# Patient Record
Sex: MALE | Race: WHITE | HISPANIC OR LATINO | URBAN - METROPOLITAN AREA
[De-identification: names, ages, dates, MRNs, and addresses within clinical notes are randomized per-mention and may not be internally consistent; named-entity substitution may affect disease eponyms.]

---

## 2017-05-26 PROBLEM — Z00.00 ENCOUNTER FOR PREVENTIVE HEALTH EXAMINATION: Status: ACTIVE | Noted: 2017-05-26

## 2017-06-20 ENCOUNTER — OUTPATIENT (OUTPATIENT)
Dept: OUTPATIENT SERVICES | Facility: HOSPITAL | Age: 57
LOS: 1 days | Discharge: HOME | End: 2017-06-20

## 2017-06-20 ENCOUNTER — APPOINTMENT (OUTPATIENT)
Dept: INTERNAL MEDICINE | Facility: CLINIC | Age: 57
End: 2017-06-20

## 2017-06-20 VITALS
TEMPERATURE: 95.9 F | HEIGHT: 68 IN | DIASTOLIC BLOOD PRESSURE: 81 MMHG | SYSTOLIC BLOOD PRESSURE: 132 MMHG | BODY MASS INDEX: 30.01 KG/M2 | HEART RATE: 62 BPM | WEIGHT: 198 LBS

## 2017-06-20 DIAGNOSIS — E11.9 TYPE 2 DIABETES MELLITUS W/OUT COMPLICATIONS: ICD-10-CM

## 2017-06-20 DIAGNOSIS — Z78.9 OTHER SPECIFIED HEALTH STATUS: ICD-10-CM

## 2017-06-20 DIAGNOSIS — I10 ESSENTIAL (PRIMARY) HYPERTENSION: ICD-10-CM

## 2017-06-20 RX ORDER — METFORMIN HYDROCHLORIDE 1000 MG/1
1000 TABLET, FILM COATED, EXTENDED RELEASE ORAL TWICE DAILY
Qty: 60 | Refills: 0 | Status: DISCONTINUED | COMMUNITY
Start: 2017-06-20 | End: 2017-06-20

## 2017-06-20 RX ORDER — METFORMIN HYDROCHLORIDE 1000 MG/1
1000 TABLET, COATED ORAL
Qty: 60 | Refills: 2 | Status: ACTIVE | COMMUNITY
Start: 2017-06-20 | End: 1900-01-01

## 2017-06-20 RX ORDER — BLOOD SUGAR DIAGNOSTIC
STRIP MISCELLANEOUS 3 TIMES DAILY
Qty: 1 | Refills: 5 | Status: ACTIVE | COMMUNITY
Start: 2017-06-20 | End: 1900-01-01

## 2017-06-20 RX ORDER — ENALAPRIL MALEATE 10 MG/1
10 TABLET ORAL TWICE DAILY
Qty: 60 | Refills: 2 | Status: ACTIVE | COMMUNITY
Start: 2017-06-20 | End: 1900-01-01

## 2017-06-21 LAB
ALBUMIN SERPL-MCNC: 4.6 G/DL
ALBUMIN/GLOB SERPL: 1.77
ALP SERPL-CCNC: 130 IU/L
ALT SERPL-CCNC: 25 IU/L
ANION GAP SERPL CALC-SCNC: 8 MEQ/L
AST SERPL-CCNC: 26 IU/L
BASOPHILS # BLD: 0.02 TH/MM3
BASOPHILS NFR BLD: 0.2 %
BILIRUB SERPL-MCNC: 1.1 MG/DL
BUN SERPL-MCNC: 16 MG/DL
BUN/CREAT SERPL: 20.5 %
CALCIUM SERPL-MCNC: 10 MG/DL
CHLORIDE SERPL-SCNC: 104 MEQ/L
CHOLEST SERPL-MCNC: 193 MG/DL
CO2 SERPL-SCNC: 26 MEQ/L
CREAT SERPL-MCNC: 0.78 MG/DL
CREAT UR-MCNC: 72 MG/DL
DIFFERENTIAL METHOD BLD: NORMAL
EOSINOPHIL # BLD: 0.22 TH/MM3
EOSINOPHIL NFR BLD: 2.7 %
ERYTHROCYTE [DISTWIDTH] IN BLOOD BY AUTOMATED COUNT: 12.6 %
ESTIMATED AVERGAGE GLUCOSE (NORTH): 148 MG/DL
GFR SERPL CREATININE-BSD FRML MDRD: 103
GLUCOSE SERPL-MCNC: 166 MG/DL
GRANULOCYTES # BLD: 5.42 TH/MM3
GRANULOCYTES NFR BLD: 66.4 %
HBA1C MFR BLD: 6.8 %
HCT VFR BLD AUTO: 43.6 %
HDLC SERPL-MCNC: 47 MG/DL
HDLC SERPL: 4.11
HGB BLD-MCNC: 14.8 G/DL
IMM GRANULOCYTES # BLD: 0.03 TH/MM3
IMM GRANULOCYTES NFR BLD: 0.4 %
LDLC SERPL DIRECT ASSAY-MCNC: 124 MG/DL
LYMPHOCYTES # BLD: 2.08 TH/MM3
LYMPHOCYTES NFR BLD: 25.5 %
MCH RBC QN AUTO: 31.4 PG
MCHC RBC AUTO-ENTMCNC: 33.9 G/DL
MCV RBC AUTO: 92.6 FL
MICROALBUMIN UR-MCNC: 1.8 MG/DL
MICROALBUMIN/CREAT UR-RTO: 25 MG/G
MONOCYTES # BLD: 0.39 TH/MM3
MONOCYTES NFR BLD: 4.8 %
PLATELET # BLD: 219 TH/MM3
PMV BLD AUTO: 11.2 FL
POTASSIUM SERPL-SCNC: 4.3 MMOL/L
PROT SERPL-MCNC: 7.2 G/DL
RBC # BLD AUTO: 4.71 MIL/MM3
SODIUM SERPL-SCNC: 138 MEQ/L
TRIGL SERPL-MCNC: 141 MG/DL
VLDLC SERPL-MCNC: 28 MG/DL
WBC # BLD: 8.16 TH/MM3

## 2017-06-28 DIAGNOSIS — E11.9 TYPE 2 DIABETES MELLITUS WITHOUT COMPLICATIONS: ICD-10-CM

## 2017-06-28 DIAGNOSIS — Z00.01 ENCOUNTER FOR GENERAL ADULT MEDICAL EXAMINATION WITH ABNORMAL FINDINGS: ICD-10-CM

## 2017-09-22 ENCOUNTER — APPOINTMENT (OUTPATIENT)
Dept: GASTROENTEROLOGY | Facility: CLINIC | Age: 57
End: 2017-09-22

## 2018-02-05 ENCOUNTER — APPOINTMENT (OUTPATIENT)
Dept: UROLOGY | Facility: CLINIC | Age: 58
End: 2018-02-05

## 2018-05-22 ENCOUNTER — EMERGENCY (EMERGENCY)
Facility: HOSPITAL | Age: 58
LOS: 0 days | Discharge: HOME | End: 2018-05-22
Admitting: PHYSICIAN ASSISTANT

## 2018-05-22 ENCOUNTER — EMERGENCY (EMERGENCY)
Facility: HOSPITAL | Age: 58
LOS: 0 days | Discharge: HOME | End: 2018-05-22
Attending: EMERGENCY MEDICINE | Admitting: EMERGENCY MEDICINE

## 2018-05-22 VITALS
HEART RATE: 69 BPM | DIASTOLIC BLOOD PRESSURE: 83 MMHG | SYSTOLIC BLOOD PRESSURE: 170 MMHG | TEMPERATURE: 98 F | RESPIRATION RATE: 19 BRPM | OXYGEN SATURATION: 99 %

## 2018-05-22 VITALS
OXYGEN SATURATION: 99 % | TEMPERATURE: 98 F | HEART RATE: 71 BPM | SYSTOLIC BLOOD PRESSURE: 145 MMHG | RESPIRATION RATE: 18 BRPM | DIASTOLIC BLOOD PRESSURE: 76 MMHG

## 2018-05-22 VITALS
HEART RATE: 78 BPM | TEMPERATURE: 97 F | SYSTOLIC BLOOD PRESSURE: 170 MMHG | RESPIRATION RATE: 20 BRPM | OXYGEN SATURATION: 99 % | DIASTOLIC BLOOD PRESSURE: 78 MMHG

## 2018-05-22 DIAGNOSIS — Z79.2 LONG TERM (CURRENT) USE OF ANTIBIOTICS: ICD-10-CM

## 2018-05-22 DIAGNOSIS — K08.89 OTHER SPECIFIED DISORDERS OF TEETH AND SUPPORTING STRUCTURES: ICD-10-CM

## 2018-05-22 DIAGNOSIS — E11.9 TYPE 2 DIABETES MELLITUS WITHOUT COMPLICATIONS: ICD-10-CM

## 2018-05-22 DIAGNOSIS — I10 ESSENTIAL (PRIMARY) HYPERTENSION: ICD-10-CM

## 2018-05-22 DIAGNOSIS — Z79.899 OTHER LONG TERM (CURRENT) DRUG THERAPY: ICD-10-CM

## 2018-05-22 DIAGNOSIS — R50.9 FEVER, UNSPECIFIED: ICD-10-CM

## 2018-05-22 RX ORDER — AMOXICILLIN 250 MG/5ML
1 SUSPENSION, RECONSTITUTED, ORAL (ML) ORAL
Qty: 20 | Refills: 0
Start: 2018-05-22 | End: 2018-05-31

## 2018-05-22 RX ORDER — AMOXICILLIN 250 MG/5ML
875 SUSPENSION, RECONSTITUTED, ORAL (ML) ORAL ONCE
Qty: 0 | Refills: 0 | Status: COMPLETED | OUTPATIENT
Start: 2018-05-22 | End: 2018-05-22

## 2018-05-22 RX ORDER — IBUPROFEN 200 MG
1 TABLET ORAL
Qty: 42 | Refills: 0
Start: 2018-05-22 | End: 2018-06-04

## 2018-05-22 RX ADMIN — Medication 875 MILLIGRAM(S): at 05:21

## 2018-05-22 NOTE — ED PROVIDER NOTE - PHYSICAL EXAMINATION
Physical Exam    Vital Signs: I have reviewed the initial vital signs.  Constitutional: well-nourished, appears stated age, no acute distress  Eyes: PERRLA, EOM intact, RAPD absent, and symmetrical lids.  ENT: (+) TTP over tooth number 2. Neck supple with no adenopathy, moist MM, no swelling under tongue, no uvula deviation, no stridors breath.   Respiratory: good resp effort, not in distress.

## 2018-05-22 NOTE — ED PROVIDER NOTE - ATTENDING CONTRIBUTION TO CARE
58 yo male with 2 days of toothache to right upper molar tooth pain with no signs of dental abscess or facial swelling. No fever, no chills, no sob, no cp, no neck pain. Pt a diabetic. Agree with PA management. Supportive care. 58 yo male with 2 days of toothache to right upper molar tooth pain with no signs of dental abscess or facial swelling. No fever, no chills, no sob, no cp, no neck pain. Pt a diabetic. Agree with PA management with pain med and abx. Supportive care.

## 2018-05-22 NOTE — ED PROVIDER NOTE - MEDICAL DECISION MAKING DETAILS
toothache with no signs of abscess or facial swelling. Pain meds and abx, go to dental clinic later today.

## 2018-05-22 NOTE — ED PROVIDER NOTE - OBJECTIVE STATEMENT
56 yo m reports w/R. upper rear toothache 2 d. in duration not associated with fevers, swelling under tongue, no swelling on the side of face, no SOB, no neck swelling.    I have reviewed available current nursing and previous documentation of past medical, surgical, family, and/or social history.

## 2018-05-22 NOTE — ED PROVIDER NOTE - NS ED ROS FT
Review of Systems    Constitutional: (-) fever (-) weakness (-) diaphoresis   Eyes: (-) change in vision (-) eye pain  ENT: (-) sore throat (-) ear ache (-) nasal discharge  Respiratory: (-) SOB

## 2018-05-22 NOTE — ED PROCEDURE NOTE - CPROC ED POST PROC CARE GUIDE1
Instructed patient/caregiver to follow-up with primary dentist./Avoid hot food./Instructed patient/caregiver regarding signs and symptoms of infection./Avoid solid food./Verbal/written post procedure instructions were given to patient/caregiver.

## 2018-05-22 NOTE — ED PROVIDER NOTE - ENMT, MLM
Airway patent, Nasal mucosa clear. Mouth with normal mucosa. Throat has no vesicles, no oropharyngeal exudates and uvula is midline, (+) tenderness percussion tooth #1

## 2018-05-22 NOTE — ED PROVIDER NOTE - MEDICAL DECISION MAKING DETAILS
continue amoxicillin; send Rx for Ibuprofen to pharmacy; return to ER after 8:30 AM tomorrow for dental transfer; any new or worsening symptoms, pt will return to ER   Note complete

## 2018-05-23 ENCOUNTER — EMERGENCY (EMERGENCY)
Facility: HOSPITAL | Age: 58
LOS: 0 days | Discharge: HOME | End: 2018-05-23
Admitting: PHYSICIAN ASSISTANT

## 2018-05-23 VITALS
SYSTOLIC BLOOD PRESSURE: 151 MMHG | DIASTOLIC BLOOD PRESSURE: 77 MMHG | TEMPERATURE: 98 F | HEART RATE: 76 BPM | OXYGEN SATURATION: 97 % | RESPIRATION RATE: 18 BRPM

## 2018-05-23 DIAGNOSIS — I10 ESSENTIAL (PRIMARY) HYPERTENSION: ICD-10-CM

## 2018-05-23 DIAGNOSIS — Z79.2 LONG TERM (CURRENT) USE OF ANTIBIOTICS: ICD-10-CM

## 2018-05-23 DIAGNOSIS — E11.9 TYPE 2 DIABETES MELLITUS WITHOUT COMPLICATIONS: ICD-10-CM

## 2018-05-23 DIAGNOSIS — K08.89 OTHER SPECIFIED DISORDERS OF TEETH AND SUPPORTING STRUCTURES: ICD-10-CM

## 2018-05-23 NOTE — ED PROVIDER NOTE - PHYSICAL EXAMINATION
CONSTITUTIONAL: Well-developed; well-nourished; in no acute distress, nontoxic appearing  SKIN: skin exam is warm and dry,  HEAD: Normocephalic; atraumatic.  EYES: PERRL, 3 mm bilateral, no nystagmus, EOM intact; conjunctiva and sclera clear.  ENT: + tenderness along tooth #1, no e/e/e, no drainage; MMM, no nasal congestion  NECK: Supple; non tender.+ full passive ROM in all directions. No JVD  CARD: S1, S2 normal, no murmur  EXT: Normal ROM. No cyanosis or edema. Dp Pulses intact.   NEURO: awake, alert, following commands, oriented, grossly unremarkable. No Focal deficits. GCS 15.   PSYCH: Cooperative, appropriate.

## 2018-05-23 NOTE — CONSULT NOTE ADULT - SUBJECTIVE AND OBJECTIVE BOX
Patient is a 57y old  Male who presents with a chief complaint of "pain in upper right since yesterday, went to ED and told to come back for evaluation"    HPI: Hypertension, Diabetes      PAST MEDICAL & SURGICAL HISTORY:  Diabetes  HTN (hypertension)  No significant past surgical history    (  - ) heart valve replacement  ( -  ) joint replacement  ( -  ) pregnancy    MEDICATIONS  (STANDING): Metformin and hypertension medication (does not know name)    MEDICATIONS  (PRN): Denies      REVIEW OF SYSTEMS      General:	Denies    Skin/Breast:  Denies  	  Ophthalmologic: Denies  	  ENMT:	Denies    Respiratory and Thorax:  Denies  	  Cardiovascular:	Hypertension    Gastrointestinal:	Denies    Genitourinary:	Denies    Musculoskeletal:	Denies    Neurological:	Denies    Psychiatric:	Denies    Hematology/Lymphatics:	 Denies    Endocrine:	Diabetes    Allergic/Immunologic:	Denies    Allergies    No Known Allergies    Intolerances        FAMILY HISTORY:  No pertinent family history in first degree relatives      *SOCIAL HISTORY: ( -  ) Tobacco; ( -  ) ETOH    Vital Signs Last 24 Hrs  T(C): 36.4 (23 May 2018 09:30), Max: 36.4 (23 May 2018 09:30)  T(F): 97.5 (23 May 2018 09:30), Max: 97.5 (23 May 2018 09:30)  HR: 76 (23 May 2018 09:30) (76 - 78)  BP: 151/77 (23 May 2018 09:30) (151/77 - 170/78)  BP(mean): --  RR: 18 (23 May 2018 09:30) (18 - 20)  SpO2: 97% (23 May 2018 09:30) (97% - 99%)    LABS: None                  Last Dental Visit: Unknown    EOE:  TMJ ( -  ) clicks                     ( -  ) pops                     ( -  ) crepitus             Mandible <<FROM>>             Facial bones and MOM <<grossly intact>>             ( -  ) trismus             ( -  ) lymphadenopathy             ( -  ) swelling             ( -  ) asymmetry             ( -  ) palpation             ( -  ) dyspnea             ( -  ) dysphagia             ( -       ) loss of consciousness    IOE:  <<permanent/primary/mixed>> dentition: Permanent dentition, caries            <<grossly intact>> OR             <<multiple carious teeth>> OR             <<multiple missing teeth>>             Dentition Present <<  >>                     Mobility <<  >>                     Caries <<  >>                hard/soft palate:  ( -  ) palatal torus, <<No pathology noted>>            tongue/FOM <<No pathology noted>>            labial/buccal mucosa <<No pathology noted>>           ( +  ) percussion           (  + ) palpation           ( +  ) swelling            ( +  ) abscess           ( +  ) sinus tract    *DENTAL RADIOGRAPHS: Periapical radiograph shows caries on #1, missing #2    RADIOLOGY & ADDITIONAL STUDIES:    *ASSESSMENT: Symptomatic irreversible pulpitis #1    *PLAN: Extraction #1    PROCEDURE: Risks and benefits explained as per OS sheet dated 7/13/00.   Verbal and written consent given. Side site completed  Anesthesia: <<  2 cartridges 4% Septocaine with 1:100.000 epinephrine as infiltrations  >>   Treatment: << Routine extraction #1 by elevation without complication. Purulent drainage obtained. Hemostasis achieved. Post operative radiograph negative. Post operative instructions given.    >>     RECOMMENDATIONS:  1) Continue antibiotics previously prescribed by ED.  2) Dental F/U with outpatient dentist for comprehensive dental care.   3) If any difficulty swallowing/breathing, fever occur, return to ER.     Stephanie Luis, DMD #[id number]

## 2018-05-23 NOTE — ED PROVIDER NOTE - NS ED ROS FT
Constitutional:  no fevers, no chills, no malaise  Eyes:  No visual changes  ENMT: + dental pain; No neck pain or stiffness, no nasal congestion, no ear pain, no throat pain  Cardiac:  No chest pain  Respiratory:  No cough or sob  GI:  No nausea, vomiting, diarrhea or abdominal pain.  :  No dysuria, frequency or burning.  MS:  No back pain, no joint pain.  Neuro:  No headache, no dizziness, no change in mental status  Skin:  No skin rash  Except as documented in the HPI,  all other systems are negative

## 2018-08-07 PROBLEM — E11.9 TYPE 2 DIABETES MELLITUS WITHOUT COMPLICATIONS: Chronic | Status: ACTIVE | Noted: 2018-05-22

## 2018-08-07 PROBLEM — I10 ESSENTIAL (PRIMARY) HYPERTENSION: Chronic | Status: ACTIVE | Noted: 2018-05-22

## 2018-10-01 ENCOUNTER — APPOINTMENT (OUTPATIENT)
Dept: OTOLARYNGOLOGY | Facility: CLINIC | Age: 58
End: 2018-10-01

## 2018-10-22 ENCOUNTER — APPOINTMENT (OUTPATIENT)
Dept: OTOLARYNGOLOGY | Facility: CLINIC | Age: 58
End: 2018-10-22
Payer: MEDICAID

## 2018-10-22 VITALS
BODY MASS INDEX: 30.31 KG/M2 | SYSTOLIC BLOOD PRESSURE: 128 MMHG | HEIGHT: 68 IN | DIASTOLIC BLOOD PRESSURE: 83 MMHG | WEIGHT: 200 LBS

## 2018-10-22 DIAGNOSIS — H61.23 IMPACTED CERUMEN, BILATERAL: ICD-10-CM

## 2018-10-22 DIAGNOSIS — H91.90 UNSPECIFIED HEARING LOSS, UNSPECIFIED EAR: ICD-10-CM

## 2018-10-22 DIAGNOSIS — Z87.09 PERSONAL HISTORY OF OTHER DISEASES OF THE RESPIRATORY SYSTEM: ICD-10-CM

## 2018-10-22 PROCEDURE — 99204 OFFICE O/P NEW MOD 45 MIN: CPT | Mod: 25

## 2018-10-22 PROCEDURE — 92557 COMPREHENSIVE HEARING TEST: CPT

## 2018-10-22 PROCEDURE — G0268 REMOVAL OF IMPACTED WAX MD: CPT

## 2018-10-22 PROCEDURE — 31575 DIAGNOSTIC LARYNGOSCOPY: CPT

## 2018-10-22 PROCEDURE — 92550 TYMPANOMETRY & REFLEX THRESH: CPT

## 2018-10-22 RX ORDER — RANITIDINE 300 MG/1
300 TABLET ORAL
Qty: 90 | Refills: 2 | Status: ACTIVE | COMMUNITY
Start: 2018-10-22 | End: 1900-01-01

## 2019-01-18 ENCOUNTER — APPOINTMENT (OUTPATIENT)
Dept: OTOLARYNGOLOGY | Facility: CLINIC | Age: 59
End: 2019-01-18

## 2019-02-26 ENCOUNTER — APPOINTMENT (OUTPATIENT)
Dept: UROLOGY | Facility: CLINIC | Age: 59
End: 2019-02-26

## 2019-03-07 NOTE — ED PROVIDER NOTE - OBJECTIVE STATEMENT
57 y.o. male with PMH of HTN and Diabetes presents to the ED c/o right upper dental pain x few days.  Pt was seen and given dental block and abx and advised to return to clinic today.  Pt denies any fever, chills, drainage, bleeding, n/v/d or any other complaints. no

## 2019-05-20 ENCOUNTER — EMERGENCY (EMERGENCY)
Facility: HOSPITAL | Age: 59
LOS: 0 days | Discharge: HOME | End: 2019-05-20
Admitting: EMERGENCY MEDICINE
Payer: MEDICAID

## 2019-05-20 VITALS
SYSTOLIC BLOOD PRESSURE: 194 MMHG | TEMPERATURE: 99 F | HEART RATE: 68 BPM | RESPIRATION RATE: 18 BRPM | OXYGEN SATURATION: 99 % | DIASTOLIC BLOOD PRESSURE: 77 MMHG

## 2019-05-20 DIAGNOSIS — K08.89 OTHER SPECIFIED DISORDERS OF TEETH AND SUPPORTING STRUCTURES: ICD-10-CM

## 2019-05-20 DIAGNOSIS — Z79.2 LONG TERM (CURRENT) USE OF ANTIBIOTICS: ICD-10-CM

## 2019-05-20 PROCEDURE — 99282 EMERGENCY DEPT VISIT SF MDM: CPT

## 2019-05-20 NOTE — ED PROVIDER NOTE - PHYSICAL EXAMINATION
CONST: Well appearing in NAD  EYES: PERRL, EOMI, Sclera and conjunctiva clear.   ENT: + ttp to tooth 3, No nasal discharge. TM's clear B/L without drainage. Oropharynx normal appearing, no erythema or exudates. No abscess or swelling. Uvula midline.  SKIN: Warm, dry, no acute rashes. Good turgor

## 2019-05-20 NOTE — PROGRESS NOTE ADULT - SUBJECTIVE AND OBJECTIVE BOX
Patient is a 58y old  Male who presents with a chief complaint of pain on upper right    HPI:      PAST MEDICAL & SURGICAL HISTORY:  Diabetes  HTN (hypertension)  No significant past surgical history    MEDICATIONS  (STANDING):    MEDICATIONS  (PRN):      REVIEW OF SYSTEMS      Allergic/Immunologic:	    Allergies    No Known Allergies    Intolerances      FAMILY HISTORY:  No pertinent family history in first degree relatives      *SOCIAL HISTORY: (   ) Tobacco; (   ) ETOH    Vital Signs Last 24 Hrs  T(C): 37 (20 May 2019 09:34), Max: 37 (20 May 2019 09:34)  T(F): 98.6 (20 May 2019 09:34), Max: 98.6 (20 May 2019 09:34)  HR: 68 (20 May 2019 09:34) (68 - 68)  BP: 194/77 (20 May 2019 09:34) (194/77 - 194/77)  BP(mean): --  RR: 18 (20 May 2019 09:34) (18 - 18)  SpO2: 99% (20 May 2019 09:34) (99% - 99%)  Last Dental Visit: <<  >>    EOE:  TMJ (   ) clicks                     (   ) pops                     (   ) crepitus             Mandible <<FROM>>             Facial bones and MOM <<grossly intact>>             (   ) trismus             (   ) lymphadenopathy             (   ) swelling             (   ) asymmetry             (   ) palpation             (   ) dyspnea             (   ) dysphagia             (   ) loss of consciousness    IOE:  <<permanent/primary/mixed>> dentition:            <<grossly intact>> OR             <<multiple carious teeth>> OR             <<multiple missing teeth>>             Dentition Present <<  >>                     Mobility <<  >>                     Caries <<  >>                hard/soft palate:  (   ) palatal torus, <<No pathology noted>>            tongue/FOM <<No pathology noted>>            labial/buccal mucosa <<No pathology noted>>           (   ) percussion           (   ) palpation           (   ) swelling            (   ) abscess           (   ) sinus tract    *DENTAL RADIOGRAPHS: 1 periapical x-ray taken    RADIOLOGY & ADDITIONAL STUDIES:    *ASSESSMENT: #3 has large restoration close to pulp. periapical radiolucency noted. severe bone loss around #3    *PLAN: extraction #3    PROCEDURE:   Verbal and written consent given. consequences and risk discussed as per OS sheet dated 07/13/00 Consent and side site completed.   Anesthesia: << 1 carpule of 2% lidocaine 1:100,000 epinephrine administered    >>   Treatment: << elevated and simple extraction. post-operative x-ray taken and care instruction given.    >>     RECOMMENDATIONS:  1) << follow care instruction   >>  2) Dental F/U with outpatient dentist for comprehensive dental care.   3) If any difficulty swallowing/breathing, fever occur, return to ER.     Orly Carnes

## 2019-07-02 ENCOUNTER — APPOINTMENT (OUTPATIENT)
Dept: UROLOGY | Facility: CLINIC | Age: 59
End: 2019-07-02
Payer: MEDICAID

## 2019-07-02 ENCOUNTER — OUTPATIENT (OUTPATIENT)
Dept: OUTPATIENT SERVICES | Facility: HOSPITAL | Age: 59
LOS: 1 days | Discharge: HOME | End: 2019-07-02

## 2019-07-02 ENCOUNTER — LABORATORY RESULT (OUTPATIENT)
Age: 59
End: 2019-07-02

## 2019-07-02 VITALS
HEART RATE: 62 BPM | SYSTOLIC BLOOD PRESSURE: 128 MMHG | WEIGHT: 200 LBS | HEIGHT: 68 IN | DIASTOLIC BLOOD PRESSURE: 83 MMHG | BODY MASS INDEX: 30.31 KG/M2

## 2019-07-02 DIAGNOSIS — N40.0 BENIGN PROSTATIC HYPERPLASIA WITHOUT LOWER URINARY TRACT SYMPMS: ICD-10-CM

## 2019-07-02 DIAGNOSIS — R30.0 DYSURIA: ICD-10-CM

## 2019-07-02 DIAGNOSIS — N48.1 BALANITIS: ICD-10-CM

## 2019-07-02 DIAGNOSIS — N47.1 PHIMOSIS: ICD-10-CM

## 2019-07-02 DIAGNOSIS — R35.0 FREQUENCY OF MICTURITION: ICD-10-CM

## 2019-07-02 PROCEDURE — 99204 OFFICE O/P NEW MOD 45 MIN: CPT

## 2019-07-02 RX ORDER — CLOTRIMAZOLE AND BETAMETHASONE DIPROPIONATE 10; .5 MG/G; MG/G
1-0.05 CREAM TOPICAL TWICE DAILY
Qty: 1 | Refills: 2 | Status: ACTIVE | COMMUNITY
Start: 2019-07-02 | End: 1900-01-01

## 2019-07-02 NOTE — ASSESSMENT
[FreeTextEntry1] : Chano is a 58-year-old male, with a history of uncontrolled type 2 diabetes, presents for evaluation of dysuria with phimosis x2 weeks. \par \par He will begin to clotrimazole/betamethasone Rx for phimosis/balanitis x2 weeks. Will followup in 6 weeks for review. We will discuss formal circumcision at that time.\par \par UA C&S will be sent to the office today.\par \par Concerning his urinary symptoms, we will obtain a renal/bladder ultrasound prior to his next appointment.

## 2019-07-02 NOTE — PHYSICAL EXAM
[General Appearance - Well Developed] : well developed [General Appearance - Well Nourished] : well nourished [Normal Appearance] : normal appearance [Well Groomed] : well groomed [General Appearance - In No Acute Distress] : no acute distress [Edema] : no peripheral edema [Respiration, Rhythm And Depth] : normal respiratory rhythm and effort [Exaggerated Use Of Accessory Muscles For Inspiration] : no accessory muscle use [Abdomen Soft] : soft [Abdomen Tenderness] : non-tender [Costovertebral Angle Tenderness] : no ~M costovertebral angle tenderness [Urethral Meatus] : meatus normal [Penis Abnormality] : normal uncircumcised penis [Urinary Bladder Findings] : the bladder was normal on palpation [Scrotum] : the scrotum was normal [Testes Tenderness] : no tenderness of the testes [Testes Mass (___cm)] : there were no testicular masses [Anus Abnormality] : the anus and perineum were normal [Rectal Exam - Rectum] : digital rectal exam was normal [Prostate Tenderness] : the prostate was not tender [No Prostate Nodules] : no prostate nodules [Prostate Size ___ gm] : prostate size [unfilled] gm [Normal Station and Gait] : the gait and station were normal for the patient's age [] : no rash [Oriented To Time, Place, And Person] : oriented to person, place, and time [No Focal Deficits] : no focal deficits [Affect] : the affect was normal [Mood] : the mood was normal [Not Anxious] : not anxious [Inguinal Lymph Nodes Enlarged Bilaterally] : inguinal [Femoral Lymph Nodes Enlarged Bilaterally] : femoral [FreeTextEntry1] : ?dorsal slit procedure in the past. Patient has phimosis with some fissuring. mild erythema. There is evidence of balanitis.

## 2019-07-02 NOTE — END OF VISIT
[FreeTextEntry3] : Agree with the above documentation as outlined by the PA which I have addended as necessary.\par

## 2019-07-02 NOTE — HISTORY OF PRESENT ILLNESS
[FreeTextEntry1] : Chano is a 58-year-old male, with a history of uncontrolled type 2 diabetes, presents for evaluation of dysuria with phimosis x2 weeks. He states that in the past he had a "procedure" on his foreskin many years ago, secondary to phimosis. Today is complaining of painful foreskin with some cracks/fissures. Additionally he has some dysuria, which started approximately 2 weeks ago.\par \par At baseline he is complaining of some feelings of incomplete bladder emptying, urinary frequency, intermittency, and 3-4 episodes of nocturia.\par \par His IPSS is 21 indicating severe symptoms. Denies pelvic pain or hematuria.\par \par He denies any erectile dysfunction at this time.

## 2019-07-03 ENCOUNTER — OUTPATIENT (OUTPATIENT)
Dept: OUTPATIENT SERVICES | Facility: HOSPITAL | Age: 59
LOS: 1 days | Discharge: HOME | End: 2019-07-03

## 2019-07-03 ENCOUNTER — OUTPATIENT (OUTPATIENT)
Dept: OUTPATIENT SERVICES | Facility: HOSPITAL | Age: 59
LOS: 1 days | Discharge: HOME | End: 2019-07-03
Payer: MEDICAID

## 2019-07-03 DIAGNOSIS — R30.0 DYSURIA: ICD-10-CM

## 2019-07-03 DIAGNOSIS — N48.1 BALANITIS: ICD-10-CM

## 2019-07-03 DIAGNOSIS — R39.15 URGENCY OF URINATION: ICD-10-CM

## 2019-07-03 DIAGNOSIS — N40.0 BENIGN PROSTATIC HYPERPLASIA WITHOUT LOWER URINARY TRACT SYMPTOMS: ICD-10-CM

## 2019-07-03 DIAGNOSIS — N47.1 PHIMOSIS: ICD-10-CM

## 2019-07-03 DIAGNOSIS — R35.0 FREQUENCY OF MICTURITION: ICD-10-CM

## 2019-07-03 LAB
APPEARANCE: ABNORMAL
BILIRUBIN URINE: ABNORMAL
BLOOD URINE: NEGATIVE
COLOR: NORMAL
GLUCOSE QUALITATIVE U: 500 MG/DL
KETONES URINE: NEGATIVE
LEUKOCYTE ESTERASE URINE: ABNORMAL
NITRITE URINE: NEGATIVE
PH URINE: 6
PROTEIN URINE: 30
SPECIFIC GRAVITY URINE: >=1.03
UROBILINOGEN URINE: 0.2 (ref 0.2–?)

## 2019-07-03 PROCEDURE — 76770 US EXAM ABDO BACK WALL COMP: CPT | Mod: 26

## 2019-07-08 LAB
BACTERIA UR CULT: ABNORMAL
PSA SERPL-MCNC: 3.95 NG/ML

## 2019-08-13 ENCOUNTER — APPOINTMENT (OUTPATIENT)
Dept: UROLOGY | Facility: CLINIC | Age: 59
End: 2019-08-13

## 2019-09-01 ENCOUNTER — OUTPATIENT (OUTPATIENT)
Dept: OUTPATIENT SERVICES | Facility: HOSPITAL | Age: 59
LOS: 1 days | End: 2019-09-01

## 2019-09-19 ENCOUNTER — EMERGENCY (EMERGENCY)
Facility: HOSPITAL | Age: 59
LOS: 0 days | Discharge: HOME | End: 2019-09-19
Attending: EMERGENCY MEDICINE | Admitting: EMERGENCY MEDICINE
Payer: MEDICAID

## 2019-09-19 VITALS
SYSTOLIC BLOOD PRESSURE: 184 MMHG | HEART RATE: 90 BPM | WEIGHT: 190.04 LBS | OXYGEN SATURATION: 98 % | HEIGHT: 66 IN | RESPIRATION RATE: 19 BRPM | DIASTOLIC BLOOD PRESSURE: 83 MMHG | TEMPERATURE: 98 F

## 2019-09-19 VITALS
DIASTOLIC BLOOD PRESSURE: 72 MMHG | OXYGEN SATURATION: 98 % | TEMPERATURE: 100 F | RESPIRATION RATE: 16 BRPM | HEART RATE: 89 BPM | SYSTOLIC BLOOD PRESSURE: 136 MMHG

## 2019-09-19 DIAGNOSIS — R33.9 RETENTION OF URINE, UNSPECIFIED: ICD-10-CM

## 2019-09-19 DIAGNOSIS — N41.9 INFLAMMATORY DISEASE OF PROSTATE, UNSPECIFIED: ICD-10-CM

## 2019-09-19 DIAGNOSIS — R10.30 LOWER ABDOMINAL PAIN, UNSPECIFIED: ICD-10-CM

## 2019-09-19 DIAGNOSIS — R50.9 FEVER, UNSPECIFIED: ICD-10-CM

## 2019-09-19 LAB
ALBUMIN SERPL ELPH-MCNC: 4.7 G/DL — SIGNIFICANT CHANGE UP (ref 3.5–5.2)
ALP SERPL-CCNC: 150 U/L — HIGH (ref 30–115)
ALT FLD-CCNC: 17 U/L — SIGNIFICANT CHANGE UP (ref 0–41)
ANION GAP SERPL CALC-SCNC: 14 MMOL/L — SIGNIFICANT CHANGE UP (ref 7–14)
APPEARANCE UR: ABNORMAL
AST SERPL-CCNC: 19 U/L — SIGNIFICANT CHANGE UP (ref 0–41)
BACTERIA # UR AUTO: NEGATIVE — SIGNIFICANT CHANGE UP
BASOPHILS # BLD AUTO: 0.03 K/UL — SIGNIFICANT CHANGE UP (ref 0–0.2)
BASOPHILS NFR BLD AUTO: 0.2 % — SIGNIFICANT CHANGE UP (ref 0–1)
BILIRUB SERPL-MCNC: 2.1 MG/DL — HIGH (ref 0.2–1.2)
BILIRUB UR-MCNC: NEGATIVE — SIGNIFICANT CHANGE UP
BUN SERPL-MCNC: 17 MG/DL — SIGNIFICANT CHANGE UP (ref 10–20)
CALCIUM SERPL-MCNC: 9.8 MG/DL — SIGNIFICANT CHANGE UP (ref 8.5–10.1)
CHLORIDE SERPL-SCNC: 97 MMOL/L — LOW (ref 98–110)
CO2 SERPL-SCNC: 22 MMOL/L — SIGNIFICANT CHANGE UP (ref 17–32)
COLOR SPEC: YELLOW — SIGNIFICANT CHANGE UP
COMMENT - URINE: SIGNIFICANT CHANGE UP
CREAT SERPL-MCNC: 1.1 MG/DL — SIGNIFICANT CHANGE UP (ref 0.7–1.5)
DIFF PNL FLD: ABNORMAL
EOSINOPHIL # BLD AUTO: 0 K/UL — SIGNIFICANT CHANGE UP (ref 0–0.7)
EOSINOPHIL NFR BLD AUTO: 0 % — SIGNIFICANT CHANGE UP (ref 0–8)
EPI CELLS # UR: 1 /HPF — SIGNIFICANT CHANGE UP (ref 0–5)
GLUCOSE SERPL-MCNC: 374 MG/DL — HIGH (ref 70–99)
GLUCOSE UR QL: ABNORMAL
HCT VFR BLD CALC: 36.7 % — LOW (ref 42–52)
HGB BLD-MCNC: 12.7 G/DL — LOW (ref 14–18)
HYALINE CASTS # UR AUTO: 1 /LPF — SIGNIFICANT CHANGE UP (ref 0–7)
IMM GRANULOCYTES NFR BLD AUTO: 2.3 % — HIGH (ref 0.1–0.3)
KETONES UR-MCNC: ABNORMAL
LEUKOCYTE ESTERASE UR-ACNC: ABNORMAL
LYMPHOCYTES # BLD AUTO: 0.71 K/UL — LOW (ref 1.2–3.4)
LYMPHOCYTES # BLD AUTO: 3.6 % — LOW (ref 20.5–51.1)
MCHC RBC-ENTMCNC: 31.3 PG — HIGH (ref 27–31)
MCHC RBC-ENTMCNC: 34.6 G/DL — SIGNIFICANT CHANGE UP (ref 32–37)
MCV RBC AUTO: 90.4 FL — SIGNIFICANT CHANGE UP (ref 80–94)
MONOCYTES # BLD AUTO: 1.17 K/UL — HIGH (ref 0.1–0.6)
MONOCYTES NFR BLD AUTO: 5.9 % — SIGNIFICANT CHANGE UP (ref 1.7–9.3)
NEUTROPHILS # BLD AUTO: 17.57 K/UL — HIGH (ref 1.4–6.5)
NEUTROPHILS NFR BLD AUTO: 88 % — HIGH (ref 42.2–75.2)
NITRITE UR-MCNC: NEGATIVE — SIGNIFICANT CHANGE UP
NRBC # BLD: 0 /100 WBCS — SIGNIFICANT CHANGE UP (ref 0–0)
PH UR: 6 — SIGNIFICANT CHANGE UP (ref 5–8)
PLATELET # BLD AUTO: 189 K/UL — SIGNIFICANT CHANGE UP (ref 130–400)
POTASSIUM SERPL-MCNC: 4.2 MMOL/L — SIGNIFICANT CHANGE UP (ref 3.5–5)
POTASSIUM SERPL-SCNC: 4.2 MMOL/L — SIGNIFICANT CHANGE UP (ref 3.5–5)
PROT SERPL-MCNC: 7.5 G/DL — SIGNIFICANT CHANGE UP (ref 6–8)
PROT UR-MCNC: ABNORMAL
RBC # BLD: 4.06 M/UL — LOW (ref 4.7–6.1)
RBC # FLD: 12.1 % — SIGNIFICANT CHANGE UP (ref 11.5–14.5)
RBC CASTS # UR COMP ASSIST: 9 /HPF — HIGH (ref 0–4)
SODIUM SERPL-SCNC: 133 MMOL/L — LOW (ref 135–146)
SP GR SPEC: 1.03 — HIGH (ref 1.01–1.02)
UROBILINOGEN FLD QL: SIGNIFICANT CHANGE UP
WBC # BLD: 19.94 K/UL — HIGH (ref 4.8–10.8)
WBC # FLD AUTO: 19.94 K/UL — HIGH (ref 4.8–10.8)
WBC UR QL: 468 /HPF — HIGH (ref 0–5)

## 2019-09-19 PROCEDURE — 99284 EMERGENCY DEPT VISIT MOD MDM: CPT

## 2019-09-19 PROCEDURE — 74177 CT ABD & PELVIS W/CONTRAST: CPT | Mod: 26

## 2019-09-19 RX ORDER — CIPROFLOXACIN LACTATE 400MG/40ML
1 VIAL (ML) INTRAVENOUS
Qty: 56 | Refills: 0
Start: 2019-09-19 | End: 2019-10-16

## 2019-09-19 RX ORDER — CEFTRIAXONE 500 MG/1
1000 INJECTION, POWDER, FOR SOLUTION INTRAMUSCULAR; INTRAVENOUS ONCE
Refills: 0 | Status: COMPLETED | OUTPATIENT
Start: 2019-09-19 | End: 2019-09-19

## 2019-09-19 RX ORDER — CIPROFLOXACIN LACTATE 400MG/40ML
1 VIAL (ML) INTRAVENOUS
Qty: 28 | Refills: 0
Start: 2019-09-19 | End: 2019-10-02

## 2019-09-19 RX ADMIN — CEFTRIAXONE 100 MILLIGRAM(S): 500 INJECTION, POWDER, FOR SOLUTION INTRAMUSCULAR; INTRAVENOUS at 07:54

## 2019-09-19 NOTE — ED PROVIDER NOTE - OBJECTIVE STATEMENT
58 yr M, hx of HTN, DM, BPH, with complaints of inability to urinate for the past 24 hours. States that the urine trickles out when he attempts to urinate. Associated with lower abd distension. Denies abd pain, no dysuria or hematuria, + subjective fevers at home, No CP, SOB, n/v. + subjective fevers. No aggravating or alleviating factors.

## 2019-09-19 NOTE — ED ADULT NURSE NOTE - NSIMPLEMENTINTERV_GEN_ALL_ED
Implemented All Universal Safety Interventions:  [place] to call system. Call bell, personal items and telephone within reach. Instruct patient to call for assistance. Room bathroom lighting operational. Non-slip footwear when patient is off stretcher. Physically safe environment: no spills, clutter or unnecessary equipment. Stretcher in lowest position, wheels locked, appropriate side rails in place.

## 2019-09-19 NOTE — ED PROVIDER NOTE - NSFOLLOWUPINSTRUCTIONS_ED_ALL_ED_FT
Prostatitis  Image   Prostatitis is swelling or inflammation of the prostate gland. The prostate is a walnut-sized gland that is involved in the production of semen. It is located below a man's bladder, in front of the rectum.    There are four types of prostatitis:  Chronic nonbacterial prostatitis. This is the most common type of prostatitis. It may be associated with a viral infection or autoimmune disorder.  Acute bacterial prostatitis. This is the least common type of prostatitis. It starts quickly and is usually associated with a bladder infection, high fever, and shaking chills. It can occur at any age.  Chronic bacterial prostatitis. This type usually results from acute bacterial prostatitis that happens repeatedly (is recurrent) or has not been treated properly. It can occur in men of any age but is most common among middle-aged men whose prostate has begun to get larger. The symptoms are not as severe as symptoms caused by acute bacterial prostatitis.  Prostatodynia or chronic pelvic pain syndrome (CPPS). This type is also called pelvic floor disorder. It is associated with increased muscular tone in the pelvis surrounding the prostate.  What are the causes?  Bacterial prostatitis is caused by infection from bacteria. Chronic nonbacterial prostatitis may be caused by:  Urinary tract infections (UTIs).  Nerve damage.  A response by the body’s disease-fighting system (autoimmune response).  Chemicals in the urine.  The causes of the other types of prostatitis are usually not known.    What are the signs or symptoms?  Symptoms of this condition vary depending upon the type of prostatitis. If you have acute bacterial prostatitis, you may experience:  Urinary symptoms, such as:  Painful urination.  Burning during urination.  Frequent and sudden urges to urinate.  Inability to start urinating.  A weak or interrupted stream of urine.  Vomiting.  Nausea.  Fever.  Chills.  Inability to empty the bladder completely.  Pain in the:  Muscles or joints.  Lower back.  Lower abdomen.  If you have any of the other types of prostatitis, you may experience:  Urinary symptoms, such as:  Sudden urges to urinate.  Frequent urination.  Difficulty starting urination.  Weak urine stream.  Dribbling after urination.  Discharge from the urethra. The urethra is a tube that opens at the end of the penis.  Pain in the:  Testicles.  Penis or tip of the penis.  Rectum.  Area in front of the rectum and below the scrotum (perineum).  Problems with sexual function.  Painful ejaculation.  Bloody semen.  How is this diagnosed?  This condition may be diagnosed based on:  A physical and medical exam.  Your symptoms.  A urine test to check for bacteria.  An exam in which a health care provider uses a finger to feel the prostate (digital rectal exam).  A test of a sample of semen.  Blood tests.  Ultrasound.  Removal of prostate tissue to be examined under a microscope (biopsy).  Tests to check how your body handles urine (urodynamic tests).  A test to look inside your bladder or urethra (cystoscopy).  How is this treated?  Treatment for this condition depends on the type of prostatitis. Treatment may involve:  Medicines to relieve pain or inflammation.  Medicines to help relax your muscles.  Physical therapy.  Heat therapy.  Techniques to help you control certain body functions (biofeedback).  Relaxation exercises.  Antibiotic medicine, if your condition is caused by bacteria.  Warm water baths (sitz baths). Sitz baths help with relaxing your pelvic floor muscles, which helps to relieve pressure on the prostate.  Follow these instructions at home:  Image   Take over-the-counter and prescription medicines only as told by your health care provider.  If you were prescribed an antibiotic, take it as told by your health care provider. Do not stop taking the antibiotic even if you start to feel better.  If physical therapy, biofeedback, or relaxation exercises were prescribed, do exercises as instructed.  Take sitz baths as directed by your health care provider. For a sitz bath, sit in warm water that is deep enough to cover your hips and buttocks.  Keep all follow-up visits as told by your health care provider. This is important.  Contact a health care provider if:  Your symptoms get worse.  You have a fever.  Get help right away if:  You have chills.  You feel nauseous.  You vomit.  You feel light-headed or feel like you are going to faint.  You are unable to urinate.  You have blood or blood clots in your urine.

## 2019-09-19 NOTE — ED PROVIDER NOTE - PHYSICAL EXAMINATION
CONSTITUTIONAL: Well-developed; well-nourished; in no acute distress, nontoxic appearing  SKIN: skin exam is warm and dry,  HEAD: Normocephalic; atraumatic.  EYES: PERRL, 3 mm bilateral, no nystagmus, EOM intact; conjunctiva and sclera clear.  ENT: MMM, no nasal congestion  NECK: Supple; non tender.+ full passive ROM in all directions. No JVD  CARD: S1, S2 normal, no murmur  RESP: No wheezes, rales or rhonchi. Good air movement bilaterally  ABD: soft; + palpable, distended urinary bladder; non-tender. No Rebound, No guarding  EXT: Normal ROM. No cyanosis or edema. Dp Pulses intact.   NEURO: awake, alert, following commands, oriented, grossly unremarkable. No Focal deficits. GCS 15.   PSYCH: Cooperative, appropriate.

## 2019-09-19 NOTE — ED ADULT NURSE NOTE - OBJECTIVE STATEMENT
Pt presents c/ difficulty in urinating , dysuria , urine specimen sent to lab , no bloody urine noted .Pt AO x 4 , denies chest pain , denies headache , denies abdominal pain , denies nausea , no vomiting noted , denies dizziness , no labored breathing , no cough , no SOB , denies bloody stool

## 2019-09-19 NOTE — ED PROVIDER NOTE - CARE PROVIDER_API CALL
Jaskaran Clark)  [facility]  [address] [place] [address], [address]  [place] [address]  Phone: [phone]  Fax: [phone]  Follow Up Time:

## 2019-09-19 NOTE — ED PROVIDER NOTE - PROGRESS NOTE DETAILS
NG: Pt seen by me, Pt feeling improved, abdomen soft, non-tender, non-distended, no rebound, no guarding. Pending CT scan. Pt feeling improved, tolerating PO, abdomen soft, non-tender, non-distended, no rebound, no guarding. Aware of all results, given a copy of all available results, comfortable with discharge and follow-up outpatient, strict return precautions given. Endorses understanding of all of this and aware that they can return at any time for new or concerning symptoms. No further questions or concerns at this time Will DC with cipro, pt amenable with plan, follow up with Urology. CT showed protatitis

## 2019-09-19 NOTE — ED PROVIDER NOTE - NS ED ROS FT
Review of Systems    Constitutional: (+) fever  Heent: No complaints as per HPI  Cardiovascular: (-) chest pain, (-) syncope  Respiratory: (-) cough, (-) shortness of breath  Gastrointestinal: (-) vomiting, (-) diarrhea, (-) abdominal pain  : As per HPI  Musculoskeletal: (-) neck pain, (-) back pain, (-) joint pain  Integumentary: (-) rash, (-) edema  Neurological: (-) headache, (-) altered mental status    Except as documented in the HPI, all other systems are negative.

## 2019-09-19 NOTE — ED PROVIDER NOTE - CLINICAL SUMMARY MEDICAL DECISION MAKING FREE TEXT BOX
Pt presenting with urinary retention. sp bagley placement with good output. Labs imaging reviewed. pt feeling improved, comfortable with discharge and outpatient follow-up. Aware of all results, given a copy of all available results, comfortable with discharge and follow-up outpatient, strict return precautions given. Endorses understanding of all of this and aware that they can return at any time for new or concerning symptoms. No further questions or concerns at this time

## 2019-09-20 DIAGNOSIS — Z71.89 OTHER SPECIFIED COUNSELING: ICD-10-CM

## 2019-09-21 RX ORDER — NITROFURANTOIN MACROCRYSTAL 50 MG
2 CAPSULE ORAL
Qty: 28 | Refills: 0
Start: 2019-09-21 | End: 2019-09-27

## 2019-09-22 ENCOUNTER — INPATIENT (INPATIENT)
Facility: HOSPITAL | Age: 59
LOS: 1 days | Discharge: HOME | End: 2019-09-24
Attending: HOSPITALIST | Admitting: HOSPITALIST
Payer: MEDICAID

## 2019-09-22 VITALS
WEIGHT: 188.05 LBS | OXYGEN SATURATION: 99 % | SYSTOLIC BLOOD PRESSURE: 143 MMHG | TEMPERATURE: 99 F | RESPIRATION RATE: 18 BRPM | DIASTOLIC BLOOD PRESSURE: 99 MMHG | HEART RATE: 85 BPM

## 2019-09-22 LAB
ALBUMIN SERPL ELPH-MCNC: 3.6 G/DL — SIGNIFICANT CHANGE UP (ref 3.5–5.2)
ALP SERPL-CCNC: 134 U/L — HIGH (ref 30–115)
ALT FLD-CCNC: 29 U/L — SIGNIFICANT CHANGE UP (ref 0–41)
ANION GAP SERPL CALC-SCNC: 11 MMOL/L — SIGNIFICANT CHANGE UP (ref 7–14)
ANION GAP SERPL CALC-SCNC: 13 MMOL/L — SIGNIFICANT CHANGE UP (ref 7–14)
APPEARANCE UR: ABNORMAL
AST SERPL-CCNC: 51 U/L — HIGH (ref 0–41)
BACTERIA # UR AUTO: NEGATIVE — SIGNIFICANT CHANGE UP
BASOPHILS # BLD AUTO: 0.02 K/UL — SIGNIFICANT CHANGE UP (ref 0–0.2)
BASOPHILS NFR BLD AUTO: 0.2 % — SIGNIFICANT CHANGE UP (ref 0–1)
BILIRUB SERPL-MCNC: 0.7 MG/DL — SIGNIFICANT CHANGE UP (ref 0.2–1.2)
BILIRUB UR-MCNC: NEGATIVE — SIGNIFICANT CHANGE UP
BUN SERPL-MCNC: 13 MG/DL — SIGNIFICANT CHANGE UP (ref 10–20)
BUN SERPL-MCNC: 14 MG/DL — SIGNIFICANT CHANGE UP (ref 10–20)
CALCIUM SERPL-MCNC: 8.8 MG/DL — SIGNIFICANT CHANGE UP (ref 8.5–10.1)
CALCIUM SERPL-MCNC: 9.1 MG/DL — SIGNIFICANT CHANGE UP (ref 8.5–10.1)
CHLORIDE SERPL-SCNC: 94 MMOL/L — LOW (ref 98–110)
CHLORIDE SERPL-SCNC: 95 MMOL/L — LOW (ref 98–110)
CO2 SERPL-SCNC: 23 MMOL/L — SIGNIFICANT CHANGE UP (ref 17–32)
CO2 SERPL-SCNC: 25 MMOL/L — SIGNIFICANT CHANGE UP (ref 17–32)
COLOR SPEC: YELLOW — SIGNIFICANT CHANGE UP
CREAT SERPL-MCNC: 0.8 MG/DL — SIGNIFICANT CHANGE UP (ref 0.7–1.5)
CREAT SERPL-MCNC: 0.9 MG/DL — SIGNIFICANT CHANGE UP (ref 0.7–1.5)
DIFF PNL FLD: ABNORMAL
EOSINOPHIL # BLD AUTO: 0.03 K/UL — SIGNIFICANT CHANGE UP (ref 0–0.7)
EOSINOPHIL NFR BLD AUTO: 0.3 % — SIGNIFICANT CHANGE UP (ref 0–8)
EPI CELLS # UR: 18 /HPF — HIGH (ref 0–5)
GLUCOSE BLDC GLUCOMTR-MCNC: 242 MG/DL — HIGH (ref 70–99)
GLUCOSE BLDC GLUCOMTR-MCNC: 328 MG/DL — HIGH (ref 70–99)
GLUCOSE SERPL-MCNC: 281 MG/DL — HIGH (ref 70–99)
GLUCOSE SERPL-MCNC: 328 MG/DL — HIGH (ref 70–99)
GLUCOSE UR QL: ABNORMAL
HCT VFR BLD CALC: 35.6 % — LOW (ref 42–52)
HGB BLD-MCNC: 12.6 G/DL — LOW (ref 14–18)
HYALINE CASTS # UR AUTO: 11 /LPF — HIGH (ref 0–7)
IMM GRANULOCYTES NFR BLD AUTO: 0.8 % — HIGH (ref 0.1–0.3)
KETONES UR-MCNC: ABNORMAL
LEUKOCYTE ESTERASE UR-ACNC: ABNORMAL
LYMPHOCYTES # BLD AUTO: 1.09 K/UL — LOW (ref 1.2–3.4)
LYMPHOCYTES # BLD AUTO: 11.4 % — LOW (ref 20.5–51.1)
MCHC RBC-ENTMCNC: 31.2 PG — HIGH (ref 27–31)
MCHC RBC-ENTMCNC: 35.4 G/DL — SIGNIFICANT CHANGE UP (ref 32–37)
MCV RBC AUTO: 88.1 FL — SIGNIFICANT CHANGE UP (ref 80–94)
MONOCYTES # BLD AUTO: 0.79 K/UL — HIGH (ref 0.1–0.6)
MONOCYTES NFR BLD AUTO: 8.3 % — SIGNIFICANT CHANGE UP (ref 1.7–9.3)
NEUTROPHILS # BLD AUTO: 7.55 K/UL — HIGH (ref 1.4–6.5)
NEUTROPHILS NFR BLD AUTO: 79 % — HIGH (ref 42.2–75.2)
NITRITE UR-MCNC: NEGATIVE — SIGNIFICANT CHANGE UP
NRBC # BLD: 0 /100 WBCS — SIGNIFICANT CHANGE UP (ref 0–0)
PH UR: 6.5 — SIGNIFICANT CHANGE UP (ref 5–8)
PLATELET # BLD AUTO: 236 K/UL — SIGNIFICANT CHANGE UP (ref 130–400)
POTASSIUM SERPL-MCNC: 4.4 MMOL/L — SIGNIFICANT CHANGE UP (ref 3.5–5)
POTASSIUM SERPL-MCNC: 5.5 MMOL/L — HIGH (ref 3.5–5)
POTASSIUM SERPL-SCNC: 4.4 MMOL/L — SIGNIFICANT CHANGE UP (ref 3.5–5)
POTASSIUM SERPL-SCNC: 5.5 MMOL/L — HIGH (ref 3.5–5)
PROT SERPL-MCNC: 6.9 G/DL — SIGNIFICANT CHANGE UP (ref 6–8)
PROT UR-MCNC: ABNORMAL
RBC # BLD: 4.04 M/UL — LOW (ref 4.7–6.1)
RBC # FLD: 11.9 % — SIGNIFICANT CHANGE UP (ref 11.5–14.5)
RBC CASTS # UR COMP ASSIST: >720 /HPF — HIGH (ref 0–4)
SODIUM SERPL-SCNC: 130 MMOL/L — LOW (ref 135–146)
SODIUM SERPL-SCNC: 131 MMOL/L — LOW (ref 135–146)
SP GR SPEC: 1.02 — SIGNIFICANT CHANGE UP (ref 1.01–1.02)
UROBILINOGEN FLD QL: SIGNIFICANT CHANGE UP
WBC # BLD: 9.56 K/UL — SIGNIFICANT CHANGE UP (ref 4.8–10.8)
WBC # FLD AUTO: 9.56 K/UL — SIGNIFICANT CHANGE UP (ref 4.8–10.8)
WBC UR QL: 65 /HPF — HIGH (ref 0–5)

## 2019-09-22 PROCEDURE — 99285 EMERGENCY DEPT VISIT HI MDM: CPT

## 2019-09-22 RX ORDER — CHLORHEXIDINE GLUCONATE 213 G/1000ML
1 SOLUTION TOPICAL
Refills: 0 | Status: DISCONTINUED | OUTPATIENT
Start: 2019-09-22 | End: 2019-09-24

## 2019-09-22 RX ORDER — SITAGLIPTIN 50 MG/1
1 TABLET, FILM COATED ORAL
Qty: 0 | Refills: 0 | DISCHARGE

## 2019-09-22 RX ORDER — ENOXAPARIN SODIUM 100 MG/ML
40 INJECTION SUBCUTANEOUS AT BEDTIME
Refills: 0 | Status: DISCONTINUED | OUTPATIENT
Start: 2019-09-22 | End: 2019-09-24

## 2019-09-22 RX ORDER — MEROPENEM 1 G/30ML
1000 INJECTION INTRAVENOUS EVERY 8 HOURS
Refills: 0 | Status: DISCONTINUED | OUTPATIENT
Start: 2019-09-22 | End: 2019-09-24

## 2019-09-22 RX ORDER — DEXTROSE 50 % IN WATER 50 %
25 SYRINGE (ML) INTRAVENOUS ONCE
Refills: 0 | Status: DISCONTINUED | OUTPATIENT
Start: 2019-09-22 | End: 2019-09-23

## 2019-09-22 RX ORDER — INFLUENZA VIRUS VACCINE 15; 15; 15; 15 UG/.5ML; UG/.5ML; UG/.5ML; UG/.5ML
0.5 SUSPENSION INTRAMUSCULAR ONCE
Refills: 0 | Status: DISCONTINUED | OUTPATIENT
Start: 2019-09-22 | End: 2019-09-24

## 2019-09-22 RX ORDER — GLUCAGON INJECTION, SOLUTION 0.5 MG/.1ML
1 INJECTION, SOLUTION SUBCUTANEOUS ONCE
Refills: 0 | Status: DISCONTINUED | OUTPATIENT
Start: 2019-09-22 | End: 2019-09-23

## 2019-09-22 RX ORDER — METFORMIN HYDROCHLORIDE 850 MG/1
1 TABLET ORAL
Qty: 0 | Refills: 0 | DISCHARGE

## 2019-09-22 RX ORDER — INSULIN GLARGINE 100 [IU]/ML
10 INJECTION, SOLUTION SUBCUTANEOUS AT BEDTIME
Refills: 0 | Status: DISCONTINUED | OUTPATIENT
Start: 2019-09-22 | End: 2019-09-23

## 2019-09-22 RX ORDER — DEXTROSE 50 % IN WATER 50 %
15 SYRINGE (ML) INTRAVENOUS ONCE
Refills: 0 | Status: DISCONTINUED | OUTPATIENT
Start: 2019-09-22 | End: 2019-09-23

## 2019-09-22 RX ORDER — SODIUM CHLORIDE 9 MG/ML
1000 INJECTION INTRAMUSCULAR; INTRAVENOUS; SUBCUTANEOUS ONCE
Refills: 0 | Status: COMPLETED | OUTPATIENT
Start: 2019-09-22 | End: 2019-09-22

## 2019-09-22 RX ORDER — MEROPENEM 1 G/30ML
1000 INJECTION INTRAVENOUS ONCE
Refills: 0 | Status: COMPLETED | OUTPATIENT
Start: 2019-09-22 | End: 2019-09-22

## 2019-09-22 RX ORDER — INSULIN LISPRO 100/ML
VIAL (ML) SUBCUTANEOUS
Refills: 0 | Status: DISCONTINUED | OUTPATIENT
Start: 2019-09-22 | End: 2019-09-23

## 2019-09-22 RX ORDER — TAMSULOSIN HYDROCHLORIDE 0.4 MG/1
0.4 CAPSULE ORAL AT BEDTIME
Refills: 0 | Status: DISCONTINUED | OUTPATIENT
Start: 2019-09-22 | End: 2019-09-24

## 2019-09-22 RX ORDER — DEXTROSE 50 % IN WATER 50 %
12.5 SYRINGE (ML) INTRAVENOUS ONCE
Refills: 0 | Status: DISCONTINUED | OUTPATIENT
Start: 2019-09-22 | End: 2019-09-23

## 2019-09-22 RX ADMIN — ENOXAPARIN SODIUM 40 MILLIGRAM(S): 100 INJECTION SUBCUTANEOUS at 21:53

## 2019-09-22 RX ADMIN — MEROPENEM 1000 MILLIGRAM(S): 1 INJECTION INTRAVENOUS at 12:45

## 2019-09-22 RX ADMIN — Medication 10 MILLIGRAM(S): at 18:24

## 2019-09-22 RX ADMIN — INSULIN GLARGINE 10 UNIT(S): 100 INJECTION, SOLUTION SUBCUTANEOUS at 21:53

## 2019-09-22 RX ADMIN — MEROPENEM 100 MILLIGRAM(S): 1 INJECTION INTRAVENOUS at 21:53

## 2019-09-22 RX ADMIN — TAMSULOSIN HYDROCHLORIDE 0.4 MILLIGRAM(S): 0.4 CAPSULE ORAL at 21:53

## 2019-09-22 RX ADMIN — MEROPENEM 100 MILLIGRAM(S): 1 INJECTION INTRAVENOUS at 12:03

## 2019-09-22 RX ADMIN — Medication 2: at 17:15

## 2019-09-22 RX ADMIN — SODIUM CHLORIDE 1000 MILLILITER(S): 9 INJECTION INTRAMUSCULAR; INTRAVENOUS; SUBCUTANEOUS at 13:00

## 2019-09-22 RX ADMIN — SODIUM CHLORIDE 2000 MILLILITER(S): 9 INJECTION INTRAMUSCULAR; INTRAVENOUS; SUBCUTANEOUS at 12:03

## 2019-09-22 NOTE — ED PROVIDER NOTE - NS ED ROS FT
Constitutional:  (-) fever, (-) chills, (-) lethargy  Eyes:  (-) eye pain (-) visual changes  ENMT: (-) nasal discharge, (-) sore throat. (-) neck pain or stiffness  Cardiac: (-) chest pain (-) palpitations  Respiratory:  (-) cough (-) respiratory distress.   GI:  (-) nausea (-) vomiting (-) diarrhea (-) abdominal pain.  :  (-) dysuria (-) frequency (+) burning.  MS:  (-) back pain (-) joint pain.  Neuro:  (-) headache (-) numbness (-) tingling (-) focal weakness  Skin:  (-) rash  Except as documented in the HPI,  all other systems are negative

## 2019-09-22 NOTE — ED ADULT NURSE REASSESSMENT NOTE - NS ED NURSE REASSESS COMMENT FT1
Blackwell catheter replaced, patient tolerated well, no complaints of pain. Urinalysis and urine culture sent, results pending, will monitor.

## 2019-09-22 NOTE — ED ADULT NURSE REASSESSMENT NOTE - NS ED NURSE REASSESS COMMENT FT1
IV placed, Labs sent as per MD orders. IV Meropenem admin as per MD orders, will continue to monitor.

## 2019-09-22 NOTE — ED PROVIDER NOTE - PHYSICAL EXAMINATION
CONSTITUTIONAL: well-appearing, in NAD  SKIN: Warm dry, normal skin turgor  HEAD: NCAT  EYES: no scleral icterus, conjunctiva pink  ENT: normal pharynx with no erythema or exudates  NECK: Supple; non tender. Full ROM.  CARD: RRR, no murmurs.  RESP: clear to ausculation b/l. No crackles or wheezing.  ABD: soft, non-tender, non-distended, no rebound or guarding.  PELVIC: nontender penis with mucous discharge around bagley insertion. Normal rectal exam, mild tenderness of the prostate.   EXT: no pedal edema, no calf tenderness  NEURO: normal motor. normal sensory. CN II-XII intact. Cerebellar testing normal. Normal gait.  PSYCH: Cooperative, appropriate.

## 2019-09-22 NOTE — ED PROVIDER NOTE - ATTENDING CONTRIBUTION TO CARE
57 y/o M pmh DM, BPH, p/w improving dysuria s/p presenting to ED for same 3d ago, had CT, _ UA, dx prostatitis, sent home on Cipro. No fever, abd pain, v/d, pain w/ defecation. here bc could not get soon  appnt. ROS PE above. Pt well appearing, abd s/nt. UCX shows EBSL, sensitive to ligia, imipenim.  IMP: Prostatitis. EBSL e coli. P: labs, ua, admit for iv abx.

## 2019-09-22 NOTE — H&P ADULT - NSHPPHYSICALEXAM_GEN_ALL_CORE
Vital Signs Last 24 Hrs  T(C): 37 (22 Sep 2019 08:52), Max: 37 (22 Sep 2019 08:52)  T(F): 98.6 (22 Sep 2019 08:52), Max: 98.6 (22 Sep 2019 08:52)  HR: 85 (22 Sep 2019 08:52) (85 - 85)  BP: 143/99 (22 Sep 2019 08:52) (143/99 - 143/99)  BP(mean): --  RR: 18 (22 Sep 2019 08:52) (18 - 18)  SpO2: 99% (22 Sep 2019 08:52) (99% - 99%)    PHYSICAL EXAM:  Constitutional: no acute distress ,lying comfortably in bed    Eyes: no pallor or icterus     ENMT: within normal range    Neck: NO JVD     Back: no CVA tenderness    Respiratory: VB +0    Cardiovascular:S1 +S2 +0     Gastrointestinal: soft ,non tender ,BS + ,no HS megaly  Blackwell draining clear urine     Extremities: no LE edema     Vascular: + palpable pulses    Neurological: AAO * 3   non focal

## 2019-09-22 NOTE — ED PROVIDER NOTE - CLINICAL SUMMARY MEDICAL DECISION MAKING FREE TEXT BOX
No acute ED events. labs reviewed. Pt got horace. case discussed w/ Dr. Lopez, agrees w/ admission. WIll admit to med for iv abx for prostatitis.

## 2019-09-22 NOTE — H&P ADULT - NSHPLABSRESULTS_GEN_ALL_CORE
12.6   9.56  )-----------( 236      ( 22 Sep 2019 11:43 )             35.6       131<L>  |  95<L>  |  13  ----------------------------<  281<H>  5.5<H>   |  23  |  0.8    Ca    9.1      22 Sep 2019 11:43    TPro  6.9  /  Alb  3.6  /  TBili  0.7  /  DBili  x   /  AST  51<H>  /  ALT  29  /  AlkPhos  134<H>    Urinalysis Basic - ( 22 Sep 2019 10:00 )    Color: Yellow / Appearance: Slightly Turbid / S.022 / pH: x  Gluc: x / Ketone: Moderate  / Bili: Negative / Urobili: <2 mg/dL   Blood: x / Protein: 30 mg/dL / Nitrite: Negative   Leuk Esterase: Large / RBC: >720 /HPF / WBC 65 /HPF   Sq Epi: x / Non Sq Epi: 18 /HPF / Bacteria: Negative    Culture - Urine (19 @ 04:30)    -  Amikacin: S <=16    -  Ampicillin: R >16 These ampicillin results predict results for amoxicillin    -  Ampicillin/Sulbactam: R 16/8 Enterobacter, Citrobacter, and Serratia may develop resistance during prolonged therapy (3-4 days)    -  Aztreonam: R >16    -  Cefazolin: R >16 (MIC_CL_COM_ENTERIC_CEFAZU) For uncomplicated UTI with K. pneumoniae, E. coli, or P. mirablis: VIVI <=16 is sensitive and VIVI >=32 is resistant. This also predicts results for oral agents cefaclor, cefdinir, cefpodoxime, cefprozil, cefuroxime axetil, cephalexin and locarbef for uncomplicated UTI. Note that some isolates may be susceptible to these agents while testing resistant to cefazolin.    -  Cefepime: R >16    -  Cefoxitin: S <=8    -  Ceftriaxone: R >32 Enterobacter, Citrobacter, and Serratia may develop resistance during prolonged therapy    -  Ciprofloxacin: R >2    -  Ertapenem: S <=1    -  Gentamicin: R >8    -  Imipenem: S <=1    -  Levofloxacin: R >4    -  Meropenem: S <=1    -  Nitrofurantoin: S <=32 Should not be used to treat pyelonephritis    -  Piperacillin/Tazobactam: R <=16    -  Tigecycline: S <=2    -  Tobramycin: R >8    -  Trimethoprim/Sulfamethoxazole: S <=    Specimen Source: .Urine Clean Catch (Midstream)    Culture Results:   >100,000 CFU/ml Escherichia coli ESBL    Organism Identification: Escherichia coli ESBL    Organism: Escherichia coli ESBL    Method Type: VIVI    < from: CT Abdomen and Pelvis w/ IV Cont (19 @ 07:16) >      IMPRESSION:     Blackwell catheter balloon within a collapsed urinary bladder. The urinary   bladder appears thick-walled. There is inflammatory stranding surrounding   the seminal vesicles and prostate, correlate for prostatitis.   Prostatomegaly.      < end of copied text >

## 2019-09-22 NOTE — ED PROVIDER NOTE - OBJECTIVE STATEMENT
58 y.o M w/ PMhx HTN, DM, BPH presents as follow up for prostatitis. Pt was seen in ED 3 days ago with urinary complaints and was diagnosed with prostatitis, sent home with bagley. Pt returned because he wasn't able to make an appointment with urologist for 1 month. Pt continues to have some burning with voiding and states there is discharge at the penis around the bagley. Pt sent home on cipro. Otherwise, no abd pain, no back pain, no fever, no N/V, no numbness or tingling.

## 2019-09-22 NOTE — ED PROVIDER NOTE - PROGRESS NOTE DETAILS
Pt on cipro. Blood culture with sensitivity showed pan-resistant E. Coli. Sensitive to meropenem. D/w ID who rec inpatient abx.

## 2019-09-22 NOTE — H&P ADULT - HISTORY OF PRESENT ILLNESS
58 yr old  M with  hx HTN, DM-2 , BPH presents as follow up for prostatitis.  Pt was seen in ED 3 days ago with urinary complaints, with urine hesitation ,and retention  and was diagnosed with prostatitis, sent home with bagley and PO Cipro 500 BID .  Pt returned because he wasn't able to make an appointment with urologist for 1 month. Pt continues to have some burning with voiding and states there is discharge at the penis around the bagley. Also c/o chills  and subjective fever .His urine culture grew E coli resistant to Cipro and ceftriaxone .  Otherwise, no abd pain, no back pain, no documented  fever, no N/V, no numbness or tingling.    In ER ,  pt was vitally stable   was given Meropenam   improvement in WBcs seen  Bagley changed in ER

## 2019-09-22 NOTE — ED ADULT NURSE NOTE - OBJECTIVE STATEMENT
Leighann Gabrielarekha [facility] [phone]    Doing admission for start of care. Needs to clarify Lantus order 50 units. Should this be once daily or twice daily? Kiesha aZpien is taking it once a day, Lacey Simon states the paper from the hospital shows BID.      Sliding scale goes up 4, 6, 8, 10  Cindy states her Humulin pen goes up in increments of 5
Noted, thanks
Notified Henok Mcfarland of the one time daily. Please address sliding scale.
Notified Sharyle Champ at [facility]
Once daily at this time
The patient is a 58y Male complaining of burning with urination. Patient had Blackwell catheter inserted 2 days ago in ED and was prescribed PO antibiotics. Patient still complains of discomfort with Blackwell. Patient denies any chest pain, shortness of breath, fever, abdominal pain, nausea or vomiting.

## 2019-09-23 DIAGNOSIS — R33.9 RETENTION OF URINE, UNSPECIFIED: ICD-10-CM

## 2019-09-23 LAB
ALBUMIN SERPL ELPH-MCNC: 3.7 G/DL — SIGNIFICANT CHANGE UP (ref 3.5–5.2)
ALP SERPL-CCNC: 132 U/L — HIGH (ref 30–115)
ALT FLD-CCNC: 26 U/L — SIGNIFICANT CHANGE UP (ref 0–41)
ANION GAP SERPL CALC-SCNC: 13 MMOL/L — SIGNIFICANT CHANGE UP (ref 7–14)
AST SERPL-CCNC: 24 U/L — SIGNIFICANT CHANGE UP (ref 0–41)
BASOPHILS # BLD AUTO: 0.04 K/UL — SIGNIFICANT CHANGE UP (ref 0–0.2)
BASOPHILS NFR BLD AUTO: 0.5 % — SIGNIFICANT CHANGE UP (ref 0–1)
BILIRUB SERPL-MCNC: 0.6 MG/DL — SIGNIFICANT CHANGE UP (ref 0.2–1.2)
BUN SERPL-MCNC: 15 MG/DL — SIGNIFICANT CHANGE UP (ref 10–20)
CALCIUM SERPL-MCNC: 9.1 MG/DL — SIGNIFICANT CHANGE UP (ref 8.5–10.1)
CHLORIDE SERPL-SCNC: 95 MMOL/L — LOW (ref 98–110)
CO2 SERPL-SCNC: 24 MMOL/L — SIGNIFICANT CHANGE UP (ref 17–32)
CREAT SERPL-MCNC: 0.7 MG/DL — SIGNIFICANT CHANGE UP (ref 0.7–1.5)
CULTURE RESULTS: SIGNIFICANT CHANGE UP
EOSINOPHIL # BLD AUTO: 0.1 K/UL — SIGNIFICANT CHANGE UP (ref 0–0.7)
EOSINOPHIL NFR BLD AUTO: 1.4 % — SIGNIFICANT CHANGE UP (ref 0–8)
ESTIMATED AVERAGE GLUCOSE: 203 MG/DL — HIGH (ref 68–114)
GLUCOSE BLDC GLUCOMTR-MCNC: 182 MG/DL — HIGH (ref 70–99)
GLUCOSE BLDC GLUCOMTR-MCNC: 273 MG/DL — HIGH (ref 70–99)
GLUCOSE BLDC GLUCOMTR-MCNC: 301 MG/DL — HIGH (ref 70–99)
GLUCOSE BLDC GLUCOMTR-MCNC: 316 MG/DL — HIGH (ref 70–99)
GLUCOSE BLDC GLUCOMTR-MCNC: 335 MG/DL — HIGH (ref 70–99)
GLUCOSE SERPL-MCNC: 272 MG/DL — HIGH (ref 70–99)
HBA1C BLD-MCNC: 8.7 % — HIGH (ref 4–5.6)
HCT VFR BLD CALC: 35.4 % — LOW (ref 42–52)
HCV AB S/CO SERPL IA: 0.09 S/CO — SIGNIFICANT CHANGE UP (ref 0–0.99)
HCV AB SERPL-IMP: SIGNIFICANT CHANGE UP
HGB BLD-MCNC: 12.4 G/DL — LOW (ref 14–18)
IMM GRANULOCYTES NFR BLD AUTO: 1.6 % — HIGH (ref 0.1–0.3)
LYMPHOCYTES # BLD AUTO: 1.46 K/UL — SIGNIFICANT CHANGE UP (ref 1.2–3.4)
LYMPHOCYTES # BLD AUTO: 19.8 % — LOW (ref 20.5–51.1)
MAGNESIUM SERPL-MCNC: 2.1 MG/DL — SIGNIFICANT CHANGE UP (ref 1.8–2.4)
MCHC RBC-ENTMCNC: 31.2 PG — HIGH (ref 27–31)
MCHC RBC-ENTMCNC: 35 G/DL — SIGNIFICANT CHANGE UP (ref 32–37)
MCV RBC AUTO: 89.2 FL — SIGNIFICANT CHANGE UP (ref 80–94)
MONOCYTES # BLD AUTO: 0.7 K/UL — HIGH (ref 0.1–0.6)
MONOCYTES NFR BLD AUTO: 9.5 % — HIGH (ref 1.7–9.3)
NEUTROPHILS # BLD AUTO: 4.94 K/UL — SIGNIFICANT CHANGE UP (ref 1.4–6.5)
NEUTROPHILS NFR BLD AUTO: 67.2 % — SIGNIFICANT CHANGE UP (ref 42.2–75.2)
NRBC # BLD: 0 /100 WBCS — SIGNIFICANT CHANGE UP (ref 0–0)
PLATELET # BLD AUTO: 243 K/UL — SIGNIFICANT CHANGE UP (ref 130–400)
POTASSIUM SERPL-MCNC: 4.7 MMOL/L — SIGNIFICANT CHANGE UP (ref 3.5–5)
POTASSIUM SERPL-SCNC: 4.7 MMOL/L — SIGNIFICANT CHANGE UP (ref 3.5–5)
PROT SERPL-MCNC: 6.7 G/DL — SIGNIFICANT CHANGE UP (ref 6–8)
RBC # BLD: 3.97 M/UL — LOW (ref 4.7–6.1)
RBC # FLD: 11.9 % — SIGNIFICANT CHANGE UP (ref 11.5–14.5)
SODIUM SERPL-SCNC: 132 MMOL/L — LOW (ref 135–146)
SPECIMEN SOURCE: SIGNIFICANT CHANGE UP
WBC # BLD: 7.36 K/UL — SIGNIFICANT CHANGE UP (ref 4.8–10.8)
WBC # FLD AUTO: 7.36 K/UL — SIGNIFICANT CHANGE UP (ref 4.8–10.8)

## 2019-09-23 PROCEDURE — 99221 1ST HOSP IP/OBS SF/LOW 40: CPT

## 2019-09-23 PROCEDURE — 99222 1ST HOSP IP/OBS MODERATE 55: CPT | Mod: AI

## 2019-09-23 RX ORDER — ACETAMINOPHEN 500 MG
650 TABLET ORAL EVERY 6 HOURS
Refills: 0 | Status: DISCONTINUED | OUTPATIENT
Start: 2019-09-23 | End: 2019-09-24

## 2019-09-23 RX ORDER — SODIUM CHLORIDE 9 MG/ML
1000 INJECTION, SOLUTION INTRAVENOUS
Refills: 0 | Status: DISCONTINUED | OUTPATIENT
Start: 2019-09-23 | End: 2019-09-24

## 2019-09-23 RX ORDER — DEXTROSE 50 % IN WATER 50 %
12.5 SYRINGE (ML) INTRAVENOUS ONCE
Refills: 0 | Status: DISCONTINUED | OUTPATIENT
Start: 2019-09-23 | End: 2019-09-24

## 2019-09-23 RX ORDER — DEXTROSE 50 % IN WATER 50 %
25 SYRINGE (ML) INTRAVENOUS ONCE
Refills: 0 | Status: DISCONTINUED | OUTPATIENT
Start: 2019-09-23 | End: 2019-09-24

## 2019-09-23 RX ORDER — INSULIN GLARGINE 100 [IU]/ML
21 INJECTION, SOLUTION SUBCUTANEOUS AT BEDTIME
Refills: 0 | Status: DISCONTINUED | OUTPATIENT
Start: 2019-09-23 | End: 2019-09-24

## 2019-09-23 RX ORDER — INSULIN LISPRO 100/ML
9 VIAL (ML) SUBCUTANEOUS
Refills: 0 | Status: DISCONTINUED | OUTPATIENT
Start: 2019-09-23 | End: 2019-09-24

## 2019-09-23 RX ORDER — DEXTROSE 50 % IN WATER 50 %
15 SYRINGE (ML) INTRAVENOUS ONCE
Refills: 0 | Status: DISCONTINUED | OUTPATIENT
Start: 2019-09-23 | End: 2019-09-24

## 2019-09-23 RX ORDER — INSULIN LISPRO 100/ML
VIAL (ML) SUBCUTANEOUS
Refills: 0 | Status: DISCONTINUED | OUTPATIENT
Start: 2019-09-23 | End: 2019-09-24

## 2019-09-23 RX ORDER — GLUCAGON INJECTION, SOLUTION 0.5 MG/.1ML
1 INJECTION, SOLUTION SUBCUTANEOUS ONCE
Refills: 0 | Status: DISCONTINUED | OUTPATIENT
Start: 2019-09-23 | End: 2019-09-24

## 2019-09-23 RX ADMIN — Medication 3: at 08:12

## 2019-09-23 RX ADMIN — MEROPENEM 100 MILLIGRAM(S): 1 INJECTION INTRAVENOUS at 05:13

## 2019-09-23 RX ADMIN — Medication 650 MILLIGRAM(S): at 00:33

## 2019-09-23 RX ADMIN — CHLORHEXIDINE GLUCONATE 1 APPLICATION(S): 213 SOLUTION TOPICAL at 12:56

## 2019-09-23 RX ADMIN — TAMSULOSIN HYDROCHLORIDE 0.4 MILLIGRAM(S): 0.4 CAPSULE ORAL at 22:07

## 2019-09-23 RX ADMIN — MEROPENEM 100 MILLIGRAM(S): 1 INJECTION INTRAVENOUS at 22:07

## 2019-09-23 RX ADMIN — Medication 650 MILLIGRAM(S): at 01:03

## 2019-09-23 RX ADMIN — MEROPENEM 100 MILLIGRAM(S): 1 INJECTION INTRAVENOUS at 13:01

## 2019-09-23 RX ADMIN — Medication 10 MILLIGRAM(S): at 18:22

## 2019-09-23 RX ADMIN — INSULIN GLARGINE 21 UNIT(S): 100 INJECTION, SOLUTION SUBCUTANEOUS at 22:29

## 2019-09-23 RX ADMIN — Medication 10 MILLIGRAM(S): at 05:13

## 2019-09-23 RX ADMIN — ENOXAPARIN SODIUM 40 MILLIGRAM(S): 100 INJECTION SUBCUTANEOUS at 22:07

## 2019-09-23 RX ADMIN — Medication 9 UNIT(S): at 18:23

## 2019-09-23 RX ADMIN — Medication 4: at 12:58

## 2019-09-23 NOTE — CONSULT NOTE ADULT - REASON FOR ADMISSION
worsening chills ,subjective fever ,found to have E - coli in urine ,resistant to Cipro
worsening chills ,subjective fever ,found to have E - coli in urine ,resistant to Cipro

## 2019-09-23 NOTE — CONSULT NOTE ADULT - ASSESSMENT
Urinary Retention  UTI  BPH  Prostatitis
ASSESSMENT  58y M admitted with E.COLI INFECTION      PROBLEMS  Pt with hx prostatitis, Blackwell, DM & BPH with UTI     New problem with additional W/U  acute illness with systemic symptoms    U/A Nitrite: Negative /Leuk Esterase: Large / RBC: >720 /HPF / WBC 65 /HPF  / Bacteria: Negative      Urine C&S   >100,000 CFU/ml Escherichia coli ESBL S Jordan    On meropenem  IVPB 1000 milliGRAM(s) IV Intermittent every 8 hours    2. Hyponatremia      PLAN  Continue Jordan  Ultimate D/C on IV ertapenem

## 2019-09-23 NOTE — PROGRESS NOTE ADULT - SUBJECTIVE AND OBJECTIVE BOX
*This is a Preliminary Note and will be edited*  SUBJECTIVE:    Patient is a 58y old Male who presents with a chief complaint of worsening chills ,subjective fever ,found to have E - coli in urine ,resistant to Cipro (22 Sep 2019 13:31)   PMH**  Currently admitted to medicine with the primary diagnosis of E coli infection     Today is hospital day 1d. This morning he is resting comfortably in bed and reports no new** issues or overnight events.   Pt. denies HA, Cp, Abd Pain or discomfort.**  Pt. is urinating and stooling appropriately.**    PAST MEDICAL & SURGICAL HISTORY  BPH with urinary obstruction  Diabetes  HTN (hypertension)  No significant past surgical history    SOCIAL HISTORY:  Negative for smoking/alcohol/drug use.     ALLERGIES:  No Known Allergies    MEDICATIONS:  STANDING MEDICATIONS  acetaminophen   Tablet .. 650 milliGRAM(s) Oral every 6 hours PRN Temp greater or equal to 38.5C (101.3F), Mild Pain (1 - 3)  chlorhexidine 4% Liquid 1 Application(s) Topical <User Schedule>  dextrose 40% Gel 15 Gram(s) Oral once PRN Blood Glucose LESS THAN 70 milliGRAM(s)/deciliter  dextrose 50% Injectable 12.5 Gram(s) IV Push once  dextrose 50% Injectable 25 Gram(s) IV Push once  dextrose 50% Injectable 25 Gram(s) IV Push once  enalapril 10 milliGRAM(s) Oral two times a day  enoxaparin Injectable 40 milliGRAM(s) SubCutaneous at bedtime  glucagon  Injectable 1 milliGRAM(s) IntraMuscular once PRN Glucose LESS THAN 70 milligrams/deciliter  influenza   Vaccine 0.5 milliLiter(s) IntraMuscular once  insulin glargine Injectable (LANTUS) 10 Unit(s) SubCutaneous at bedtime  insulin lispro (HumaLOG) corrective regimen sliding scale   SubCutaneous three times a day before meals  meropenem  IVPB 1000 milliGRAM(s) IV Intermittent every 8 hours  tamsulosin 0.4 milliGRAM(s) Oral at bedtime    PRN MEDICATIONS  acetaminophen   Tablet .. 650 milliGRAM(s) Oral every 6 hours PRN  dextrose 40% Gel 15 Gram(s) Oral once PRN  glucagon  Injectable 1 milliGRAM(s) IntraMuscular once PRN    VITALS:   T(F): 97.3  HR: 65 (63 - 85)  BP: 130/71 (130/71 - 186/83)  RR: 18 (18 - 18)  SpO2: 96% (96% - 99%)    LABS:                        12.4   7.36  )-----------( 243      ( 23 Sep 2019 05:53 )             35.4         130<L>  |  94<L>  |  14  ----------------------------<  328<H>  4.4   |  25  |  0.9    Ca    8.8      22 Sep 2019 22:38    TPro  6.9  /  Alb  3.6  /  TBili  0.7  /  DBili  x   /  AST  51<H>  /  ALT  29  /  AlkPhos  134<H>        Urinalysis Basic - ( 22 Sep 2019 10:00 )    Color: Yellow / Appearance: Slightly Turbid / S.022 / pH: x  Gluc: x / Ketone: Moderate  / Bili: Negative / Urobili: <2 mg/dL   Blood: x / Protein: 30 mg/dL / Nitrite: Negative   Leuk Esterase: Large / RBC: >720 /HPF / WBC 65 /HPF   Sq Epi: x / Non Sq Epi: 18 /HPF / Bacteria: Negative                    RADIOLOGY:    PHYSICAL EXAM:  GEN: In no acute distress. Pt. is awake in bed able to have a conversation.  LUNGS: CTABL, Symmetrical inspiration, no increased work of breathing  HEART: +S1,S2, RRR, No murmurs, Rubs, Gallops   ABD: Bowel Sounds Present, Soft, non tender, non distended, no guarding, no rebound.   EXT: 2+ peripheral Pulses, no clubbing, no cyanosis, no Edema.   NEURO: AAOX3. No focal deficits. CN 2-12 Grossly intact. SUBJECTIVE:    Patient is a 58y old Male who presents with a chief complaint of worsening chills ,subjective fever ,found to have E - coli in urine ,resistant to Cipro (22 Sep 2019 13:31)   PMH  Currently admitted to medicine with the primary diagnosis of E coli infection     Today is hospital day 1d. This morning he is resting comfortably in bed and reports no new issues or overnight events. Pt reports feeling significantly improved overall since admission.   Pt. denies HA, Cp, Abd Pain or discomfort.  Pt. is urinating and stooling appropriately.    Blackwell Catheter in place  PAST MEDICAL & SURGICAL HISTORY  BPH with urinary obstruction  Diabetes  HTN (hypertension)  No significant past surgical history    SOCIAL HISTORY:  Negative for smoking/alcohol/drug use.     ALLERGIES:  No Known Allergies    MEDICATIONS:  STANDING MEDICATIONS  acetaminophen   Tablet .. 650 milliGRAM(s) Oral every 6 hours PRN Temp greater or equal to 38.5C (101.3F), Mild Pain (1 - 3)  chlorhexidine 4% Liquid 1 Application(s) Topical <User Schedule>  dextrose 40% Gel 15 Gram(s) Oral once PRN Blood Glucose LESS THAN 70 milliGRAM(s)/deciliter  dextrose 50% Injectable 12.5 Gram(s) IV Push once  dextrose 50% Injectable 25 Gram(s) IV Push once  dextrose 50% Injectable 25 Gram(s) IV Push once  enalapril 10 milliGRAM(s) Oral two times a day  enoxaparin Injectable 40 milliGRAM(s) SubCutaneous at bedtime  glucagon  Injectable 1 milliGRAM(s) IntraMuscular once PRN Glucose LESS THAN 70 milligrams/deciliter  influenza   Vaccine 0.5 milliLiter(s) IntraMuscular once  insulin glargine Injectable (LANTUS) 10 Unit(s) SubCutaneous at bedtime  insulin lispro (HumaLOG) corrective regimen sliding scale   SubCutaneous three times a day before meals  meropenem  IVPB 1000 milliGRAM(s) IV Intermittent every 8 hours  tamsulosin 0.4 milliGRAM(s) Oral at bedtime    PRN MEDICATIONS  acetaminophen   Tablet .. 650 milliGRAM(s) Oral every 6 hours PRN  dextrose 40% Gel 15 Gram(s) Oral once PRN  glucagon  Injectable 1 milliGRAM(s) IntraMuscular once PRN    VITALS:   T(F): 97.3  HR: 65 (63 - 85)  BP: 130/71 (130/71 - 186/83)  RR: 18 (18 - 18)  SpO2: 96% (96% - 99%)    LABS:                        12.4   7.36  )-----------( 243      ( 23 Sep 2019 05:53 )             35.4         130<L>  |  94<L>  |  14  ----------------------------<  328<H>  4.4   |  25  |  0.9    Ca    8.8      22 Sep 2019 22:38    TPro  6.9  /  Alb  3.6  /  TBili  0.7  /  DBili  x   /  AST  51<H>  /  ALT  29  /  AlkPhos  134<H>        Urinalysis Basic - ( 22 Sep 2019 10:00 )    Color: Yellow / Appearance: Slightly Turbid / S.022 / pH: x  Gluc: x / Ketone: Moderate  / Bili: Negative / Urobili: <2 mg/dL   Blood: x / Protein: 30 mg/dL / Nitrite: Negative   Leuk Esterase: Large / RBC: >720 /HPF / WBC 65 /HPF   Sq Epi: x / Non Sq Epi: 18 /HPF / Bacteria: Negative    RADIOLOGY:    PHYSICAL EXAM:  GEN: In no acute distress. Pt. is awake in bed able to have a conversation.  LUNGS: CTABL, Symmetrical inspiration, no increased work of breathing  HEART: +S1,S2, RRR, No murmurs, Rubs, Gallops   ABD: Bowel Sounds Present, Soft, non tender, non distended, no guarding, no rebound.   EXT: 2+ peripheral Pulses, no clubbing, no cyanosis, no Edema.   NEURO: AAOX3. No focal deficits. CN 2-12 Grossly intact. SUBJECTIVE:    Patient is a 58y old Male who presents with a chief complaint of worsening chills ,subjective fever ,found to have E - coli in urine ,resistant to Cipro (22 Sep 2019 13:31)   PMH  Currently admitted to medicine with the primary diagnosis of E coli infection     Today is hospital day 1d. This morning he is resting comfortably in bed and reports no new issues or overnight events. Pt reports feeling significantly improved overall since admission.   Pt. denies HA, Cp, Abd Pain or discomfort.  Pt. is urinating and stooling appropriately.    Blackwell Catheter in place  PAST MEDICAL & SURGICAL HISTORY  BPH with urinary obstruction  Diabetes  HTN (hypertension)  No significant past surgical history    SOCIAL HISTORY:  Negative for smoking/alcohol/drug use.     ALLERGIES:  No Known Allergies    MEDICATIONS:  STANDING MEDICATIONS  acetaminophen   Tablet .. 650 milliGRAM(s) Oral every 6 hours PRN Temp greater or equal to 38.5C (101.3F), Mild Pain (1 - 3)  chlorhexidine 4% Liquid 1 Application(s) Topical <User Schedule>  dextrose 40% Gel 15 Gram(s) Oral once PRN Blood Glucose LESS THAN 70 milliGRAM(s)/deciliter  dextrose 50% Injectable 12.5 Gram(s) IV Push once  dextrose 50% Injectable 25 Gram(s) IV Push once  dextrose 50% Injectable 25 Gram(s) IV Push once  enalapril 10 milliGRAM(s) Oral two times a day  enoxaparin Injectable 40 milliGRAM(s) SubCutaneous at bedtime  glucagon  Injectable 1 milliGRAM(s) IntraMuscular once PRN Glucose LESS THAN 70 milligrams/deciliter  influenza   Vaccine 0.5 milliLiter(s) IntraMuscular once  insulin glargine Injectable (LANTUS) 10 Unit(s) SubCutaneous at bedtime  insulin lispro (HumaLOG) corrective regimen sliding scale   SubCutaneous three times a day before meals  meropenem  IVPB 1000 milliGRAM(s) IV Intermittent every 8 hours  tamsulosin 0.4 milliGRAM(s) Oral at bedtime    PRN MEDICATIONS  acetaminophen   Tablet .. 650 milliGRAM(s) Oral every 6 hours PRN  dextrose 40% Gel 15 Gram(s) Oral once PRN  glucagon  Injectable 1 milliGRAM(s) IntraMuscular once PRN    VITALS:   T(F): 97.3  HR: 65 (63 - 85)  BP: 130/71 (130/71 - 186/83)  RR: 18 (18 - 18)  SpO2: 96% (96% - 99%)    LABS:                        12.4   7.36  )-----------( 243      ( 23 Sep 2019 05:53 )             35.4         130<L>  |  94<L>  |  14  ----------------------------<  328<H>  4.4   |  25  |  0.9    Ca    8.8      22 Sep 2019 22:38    TPro  6.9  /  Alb  3.6  /  TBili  0.7  /  DBili  x   /  AST  51<H>  /  ALT  29  /  AlkPhos  134<H>        Urinalysis Basic - ( 22 Sep 2019 10:00 )    Color: Yellow / Appearance: Slightly Turbid / S.022 / pH: x  Gluc: x / Ketone: Moderate  / Bili: Negative / Urobili: <2 mg/dL   Blood: x / Protein: 30 mg/dL / Nitrite: Negative   Leuk Esterase: Large / RBC: >720 /HPF / WBC 65 /HPF   Sq Epi: x / Non Sq Epi: 18 /HPF / Bacteria: Negative    RADIOLOGY:    PHYSICAL EXAM:  GEN: In no acute distress. Pt. is awake in bed able to have a conversation.  LUNGS: CTABL, Symmetrical inspiration, no increased work of breathing  HEART: +S1,S2, RRR, No murmurs, Rubs, Gallops   ABD: Bowel Sounds Present, Soft, non tender, non distended, no guarding, no rebound. No tenderness to deep palpation over bladder  EXT: 2+ peripheral Pulses, no clubbing, no cyanosis, no Edema.   NEURO: AAOX3. No focal deficits. CN 2-12 Grossly intact.

## 2019-09-23 NOTE — CONSULT NOTE ADULT - SUBJECTIVE AND OBJECTIVE BOX
Patient is a 58y old  Male who presents with a chief complaint of worsening chills ,subjective fever ,found to have E - coli in urine ,resistant to Cipro (23 Sep 2019 07:50)      HPI:  58 yr old  M with  hx HTN, DM-2 , BPH presents as follow up for prostatitis.  Pt was seen in ED 3 days ago with urinary complaints, with urine hesitation ,and retention  and was diagnosed with prostatitis, sent home with bagley and PO Cipro 500 BID .  Pt returned because he wasn't able to make an appointment with urologist for 1 month. Pt continues to have some burning with voiding and states there is discharge at the penis around the bagley. Also c/o chills  and subjective fever .His urine culture grew E coli resistant to Cipro and ceftriaxone .  Otherwise, no abd pain, no back pain, no documented  fever, no N/V, no numbness or tingling.    In ER ,  pt was vitally stable   was given Meropenam   improvement in WBcs seen  Bagley changed in ER (22 Sep 2019 13:31)      PAST MEDICAL & SURGICAL HISTORY:  BPH with urinary obstruction  Diabetes  HTN (hypertension)  No significant past surgical history      REVIEW OF SYSTEMS:    CONSTITUTIONAL:  fevers or chills  HEENT: No visual changes  ENDO: No sweating  NECK: No pain or stiffness  MUSCULOSKELETAL: No back pain, no joint pain  RESPIRATORY: No shortness of breath  CARDIOVASCULAR: No chest pain  GASTROINTESTINAL: No abdominal or epigastric pain. No nausea, vomiting,  No diarrhea or constipation.   NEUROLOGICAL: No mental status changes  PSYCH: No depression, no mood changes  SKIN: No itching      MEDICATIONS  (STANDING):  chlorhexidine 4% Liquid 1 Application(s) Topical <User Schedule>  dextrose 50% Injectable 12.5 Gram(s) IV Push once  dextrose 50% Injectable 25 Gram(s) IV Push once  dextrose 50% Injectable 25 Gram(s) IV Push once  enalapril 10 milliGRAM(s) Oral two times a day  enoxaparin Injectable 40 milliGRAM(s) SubCutaneous at bedtime  influenza   Vaccine 0.5 milliLiter(s) IntraMuscular once  insulin glargine Injectable (LANTUS) 10 Unit(s) SubCutaneous at bedtime  insulin lispro (HumaLOG) corrective regimen sliding scale   SubCutaneous three times a day before meals  meropenem  IVPB 1000 milliGRAM(s) IV Intermittent every 8 hours  tamsulosin 0.4 milliGRAM(s) Oral at bedtime    MEDICATIONS  (PRN):  acetaminophen   Tablet .. 650 milliGRAM(s) Oral every 6 hours PRN Temp greater or equal to 38.5C (101.3F), Mild Pain (1 - 3)  dextrose 40% Gel 15 Gram(s) Oral once PRN Blood Glucose LESS THAN 70 milliGRAM(s)/deciliter  glucagon  Injectable 1 milliGRAM(s) IntraMuscular once PRN Glucose LESS THAN 70 milligrams/deciliter      Allergies    No Known Allergies    Intolerances        SOCIAL HISTORY: No illicit drug use    FAMILY HISTORY:  FH: type 2 diabetes: mother .father .sisters      Vital Signs Last 24 Hrs  T(C): 36.3 (23 Sep 2019 07:00), Max: 38.5 (23 Sep 2019 00:35)  T(F): 97.3 (23 Sep 2019 07:00), Max: 101.3 (23 Sep 2019 00:35)  HR: 65 (23 Sep 2019 07:00) (63 - 84)  BP: 130/71 (23 Sep 2019 07:00) (130/71 - 186/83)  BP(mean): --  RR: 18 (23 Sep 2019 07:00) (18 - 18)  SpO2: 96% (22 Sep 2019 15:55) (96% - 98%)    PHYSICAL EXAM:    Constitutional: NAD, well-developed  HEENT: EOMI  Neck: no pain  Back: No CVA tenderness  Respiratory: No accessory respiratory muscle use  Abd: Soft, NT/ND  no organomegally  no hernia  :   GAUDENCIO:   Extremities: no edema  Neurological: A/O x 3  Psychiatric: Normal mood, normal affect  Skin: No rashes    I&O's Summary    22 Sep 2019 07:01  -  23 Sep 2019 07:00  --------------------------------------------------------  IN: 0 mL / OUT: 700 mL / NET: -700 mL        LABS:                        12.4   7.36  )-----------( 243      ( 23 Sep 2019 05:53 )             35.4     09-    132<L>  |  95<L>  |  15  ----------------------------<  272<H>  4.7   |  24  |  0.7    Ca    9.1      23 Sep 2019 05:53  Mg     2.1         TPro  6.7  /  Alb  3.7  /  TBili  0.6  /  DBili  0.2  /  AST  24  /  ALT  26  /  AlkPhos  132<H>        Urinalysis Basic - ( 22 Sep 2019 10:00 )    Color: Yellow / Appearance: Slightly Turbid / S.022 / pH: x  Gluc: x / Ketone: Moderate  / Bili: Negative / Urobili: <2 mg/dL   Blood: x / Protein: 30 mg/dL / Nitrite: Negative   Leuk Esterase: Large / RBC: >720 /HPF / WBC 65 /HPF   Sq Epi: x / Non Sq Epi: 18 /HPF / Bacteria: Negative      Urine Culture:     19 Greater than 100 K   E coli  sensitive to Meropenum and Bactrim    RADIOLOGY & ADDITIONAL STUDIES:  < from: CT Abdomen and Pelvis w/ IV Cont (19 @ 07:16) >    EXAM:  CT ABDOMEN AND PELVIS IC            PROCEDURE DATE:  2019            INTERPRETATION:  CLINICAL STATEMENT: Urinary retention      TECHNIQUE: Contiguous axial CT images were obtained from the lower chest   to the pubic symphysis following administration of 100cc Optiray 320   intravenous contrast.  Oral contrast was not administered.  Reformatted   images in the coronal and sagittal planes were acquired.    COMPARISON CT: None.    OTHER STUDIES USED FOR CORRELATION: None.       FINDINGS:    LOWER CHEST: Unremarkable.    HEPATOBILIARY: Unremarkable.    SPLEEN: Unremarkable.    PANCREAS: Unremarkable.    ADRENAL GLANDS: Unremarkable.    KIDNEYS: Unremarkable.    ABDOMINOPELVIC NODES: Unremarkable.    PELVIC ORGANS: Bagley catheter balloon within a collapsed urinary bladder.   The urinary bladder appears thick-walled. There is inflammatory stranding   surrounding the seminal vesicles and prostate. Prostatomegaly.    PERITONEUM/MESENTERY/BOWEL: Unremarkable. Normal appendix.    BONES/SOFT TISSUES: Bilateral fat-containing inguinal hernias.    OTHER:      IMPRESSION:     Bagley catheter balloon within a collapsed urinary bladder. The urinary   bladder appears thick-walled. There is inflammatory stranding surrounding   the seminal vesicles and prostate, correlate for prostatitis.   Prostatomegaly.    < end of copied text > Patient is a 58y old  Male who presents with a chief complaint of worsening chills ,subjective fever ,found to have E - coli in urine ,resistant to Cipro (23 Sep 2019 07:50)      HPI:  58 yr old  M with  hx HTN, DM-2 , BPH presents as follow up for prostatitis.  Pt was seen in ED 3 days ago with urinary complaints, with urine hesitation ,and retention  and was diagnosed with prostatitis, sent home with bagley and PO Cipro 500 BID .  Pt returned because he wasn't able to make an appointment with urologist for 1 month. Pt continues to have some burning with voiding and states there is discharge at the penis around the bagley. Also c/o chills  and subjective fever .His urine culture grew E coli resistant to Cipro and ceftriaxone .  Otherwise, no abd pain, no back pain, no documented  fever, no N/V, no numbness or tingling.    In ER ,  pt was vitally stable   was given Meropenam   improvement in WBcs seen  Bagley changed in ER (22 Sep 2019 13:31)      PAST MEDICAL & SURGICAL HISTORY:  BPH with urinary obstruction  Diabetes  HTN (hypertension)  No significant past surgical history      REVIEW OF SYSTEMS:    CONSTITUTIONAL:  fevers or chills  HEENT: No visual changes  ENDO: No sweating  NECK: No pain or stiffness  MUSCULOSKELETAL: No back pain, no joint pain  RESPIRATORY: No shortness of breath  CARDIOVASCULAR: No chest pain  GASTROINTESTINAL: No abdominal or epigastric pain. No nausea, vomiting,  No diarrhea or constipation.   NEUROLOGICAL: No mental status changes  PSYCH: No depression, no mood changes  SKIN: No itching      MEDICATIONS  (STANDING):  chlorhexidine 4% Liquid 1 Application(s) Topical <User Schedule>  dextrose 50% Injectable 12.5 Gram(s) IV Push once  dextrose 50% Injectable 25 Gram(s) IV Push once  dextrose 50% Injectable 25 Gram(s) IV Push once  enalapril 10 milliGRAM(s) Oral two times a day  enoxaparin Injectable 40 milliGRAM(s) SubCutaneous at bedtime  influenza   Vaccine 0.5 milliLiter(s) IntraMuscular once  insulin glargine Injectable (LANTUS) 10 Unit(s) SubCutaneous at bedtime  insulin lispro (HumaLOG) corrective regimen sliding scale   SubCutaneous three times a day before meals  meropenem  IVPB 1000 milliGRAM(s) IV Intermittent every 8 hours  tamsulosin 0.4 milliGRAM(s) Oral at bedtime    MEDICATIONS  (PRN):  acetaminophen   Tablet .. 650 milliGRAM(s) Oral every 6 hours PRN Temp greater or equal to 38.5C (101.3F), Mild Pain (1 - 3)  dextrose 40% Gel 15 Gram(s) Oral once PRN Blood Glucose LESS THAN 70 milliGRAM(s)/deciliter  glucagon  Injectable 1 milliGRAM(s) IntraMuscular once PRN Glucose LESS THAN 70 milligrams/deciliter      Allergies    No Known Allergies    Intolerances        SOCIAL HISTORY: No illicit drug use    FAMILY HISTORY:  FH: type 2 diabetes: mother .father .sisters      Vital Signs Last 24 Hrs  T(C): 36.3 (23 Sep 2019 07:00), Max: 38.5 (23 Sep 2019 00:35)  T(F): 97.3 (23 Sep 2019 07:00), Max: 101.3 (23 Sep 2019 00:35)  HR: 65 (23 Sep 2019 07:00) (63 - 84)  BP: 130/71 (23 Sep 2019 07:00) (130/71 - 186/83)  BP(mean): --  RR: 18 (23 Sep 2019 07:00) (18 - 18)  SpO2: 96% (22 Sep 2019 15:55) (96% - 98%)    PHYSICAL EXAM:    Constitutional: NAD, well-developed  HEENT: EOMI  Neck: no pain  Back: No CVA tenderness  Respiratory: No accessory respiratory muscle use  Abd: Soft, NT/ND  no organomegally  no hernia  :   GAUDENCIO:   Extremities: no edema  Neurological: A/O x 3  Psychiatric: Normal mood, normal affect  Skin: No rashes    I&O's Summary    22 Sep 2019 07:01  -  23 Sep 2019 07:00  --------------------------------------------------------  IN: 0 mL / OUT: 700 mL / NET: -700 mL        LABS:                        12.4   7.36  )-----------( 243      ( 23 Sep 2019 05:53 )             35.4     09-23    132<L>  |  95<L>  |  15  ----------------------------<  272<H>  4.7   |  24  |  0.7    Ca    9.1      23 Sep 2019 05:53  Mg     2.1     09-23    TPro  6.7  /  Alb  3.7  /  TBili  0.6  /  DBili  0.2  /  AST  24  /  ALT  26  /  AlkPhos  132<H>  09-23  Culture - Urine (09.22.19 @ 10:00)    Specimen Source: .Urine Catheterized    Culture Results:   <10,000 CFU/mL Normal Urogenital Ami         Urinalysis (09.22.19 @ 10:00)    pH Urine: 6.5    Glucose Qualitative, Urine: >= 1000 mg/dL    Blood, Urine: Large    Color: Yellow    Urine Appearance: Slightly Turbid    Bilirubin: Negative    Ketone - Urine: Moderate    Specific Gravity: 1.022    Protein, Urine: 30 mg/dL    Urobilinogen: <2 mg/dL    Nitrite: Negative    Leukocyte Esterase Concentration: Large    Urine Microscopic-Add On (NC) (09.22.19 @ 10:00)    Red Blood Cell - Urine: >720 /HPF    White Blood Cell - Urine: 65 /HPF    Hyaline Casts: 11 /LPF    Bacteria: Negative    Epithelial Cells: 18 /HPF      Culture - Urine (09.22.19 @ 10:00)    Specimen Source: .Urine Catheterized    Culture Results:   <10,000 CFU/mL Normal Urogenital Ami    Culture - Urine (09.19.19 @ 04:30)    -  Amikacin: S <=16    -  Ampicillin: R >16 These ampicillin results predict results for amoxicillin    -  Ampicillin/Sulbactam: R 16/8 Enterobacter, Citrobacter, and Serratia may develop resistance during prolonged therapy (3-4 days)    -  Aztreonam: R >16    -  Cefazolin: R >16 ([email or web address]) For uncomplicated UTI with K. pneumoniae, E. coli, or P. mirablis: VIVI <=16 is sensitive and VIVI >=32 is resistant. This also predicts results for oral agents cefaclor, cefdinir, cefpodoxime, cefprozil, cefuroxime axetil, cephalexin and locarbef for uncomplicated UTI. Note that some isolates may be susceptible to these agents while testing resistant to cefazolin.    -  Cefepime: R >16    -  Cefoxitin: S <=8    -  Ceftriaxone: R >32 Enterobacter, Citrobacter, and Serratia may develop resistance during prolonged therapy    -  Ciprofloxacin: R >2    -  Ertapenem: S <=1    -  Gentamicin: R >8    -  Imipenem: S <=1    -  Levofloxacin: R >4    -  Meropenem: S <=1    -  Nitrofurantoin: S <=32 Should not be used to treat pyelonephritis    -  Piperacillin/Tazobactam: R <=16    -  Tigecycline: S <=2    -  Tobramycin: R >8    -  Trimethoprim/Sulfamethoxazole: S <=2/38    Specimen Source: .Urine Clean Catch (Midstream)    Culture Results:   >100,000 CFU/ml Escherichia coli ESBL    Organism Identification: Escherichia coli ESBL    Organism: Escherichia coli ESBL    Method Type: VIVI        RADIOLOGY & ADDITIONAL STUDIES:  < from: CT Abdomen and Pelvis w/ IV Cont (09.19.19 @ 07:16) >    EXAM:  CT ABDOMEN AND PELVIS IC            PROCEDURE DATE:  09/19/2019      INTERPRETATION:  CLINICAL STATEMENT: Urinary retention      TECHNIQUE: Contiguous axial CT images were obtained from the lower chest   to the pubic symphysis following administration of 100cc Optiray 320   intravenous contrast.  Oral contrast was not administered.  Reformatted   images in the coronal and sagittal planes were acquired.    COMPARISON CT: None.    OTHER STUDIES USED FOR CORRELATION: None.       FINDINGS:    LOWER CHEST: Unremarkable.    HEPATOBILIARY: Unremarkable.    SPLEEN: Unremarkable.    PANCREAS: Unremarkable.    ADRENAL GLANDS: Unremarkable.    KIDNEYS: Unremarkable.    ABDOMINOPELVIC NODES: Unremarkable.    PELVIC ORGANS: Bagley catheter balloon within a collapsed urinary bladder.   The urinary bladder appears thick-walled. There is inflammatory stranding   surrounding the seminal vesicles and prostate. Prostatomegaly.    PERITONEUM/MESENTERY/BOWEL: Unremarkable. Normal appendix.    BONES/SOFT TISSUES: Bilateral fat-containing inguinal hernias.    OTHER:      IMPRESSION:     Bagley catheter balloon within a collapsed urinary bladder. The urinary   bladder appears thick-walled. There is inflammatory stranding surrounding   the seminal vesicles and prostate, correlate for prostatitis.   Prostatomegaly.    < end of copied text >

## 2019-09-23 NOTE — PROGRESS NOTE ADULT - ASSESSMENT
58M w HTN, DM-2 , BPH presents w prostatitis ,found to have E coli resistant to PO abx on Ucx    # Acute prostatitis/UTI d/t MDR E.coli   cw Meropenem  for now   started on PO Flomax ,cw Blackwell   f/u ID recommendations for best choice ABX for MDR prostatitis    # BPH  # Acute urinary retention  Urology following  agrees w flomax & treating UTI      # D.MII  cw insulin Lantus    cw FS monitoring     #HTN    cont ACE  DASH diet     # transaminitis ;  unclear etiology  trend LFts for now and treat underlying infection         #Progress Note Handoff    Pending (specify):  Consults__ID consult_______, Tests________, Test Results_______, Other_________    Family discussion: [id number]    Disposition: Home_x__/SNF___/Other________/Unknown at this time________  once Prostatitis resolved & ID consult has directed ABX

## 2019-09-23 NOTE — PROGRESS NOTE ADULT - ASSESSMENT
· Assessment		  58 yr old  M with  hx HTN, DM-2 , BPH presents as follow up for prostatitis ,found to have E colic resistant to PO abx on U/culture .    # Acute prostatitis resistant to PO abx ;  in setting of BPH with obstructive symptoms   WBCs improving   follow blood culture   ID consultation   will likely need iv abx at discharge   cw Meropenem  for now   started on PO Flomax ,cw Blackwell   Urology consultation       # D.M -2 ;  cw insulin Lantus   d/c oral meds   cw FS monitoring     #HTN ;   taking Enalapril 10 mg BID at home   DASH diet     # transaminitis ;  likely 2/2 abx   trend LFts    if trending up than consider US liver    # Hemolyzed BMP with high K +;  repeats CMP     # DVT ppx with Lovenox   DASH /crab consistent diet   ambulate as tolerated   FULL code     #dispo; after ID eval   will likely need iv abx at discharge     [facility]Z CONFIRM MEDS WITH [facility] ,WHICH IS CLOSED ON SUNDAY ,PT DON'T REMEMBER ALL MEDS . 58 yr old  M with  hx HTN, DM-2 , BPH presents as follow up for prostatitis ,found to have E colic resistant to PO abx on U/culture .    # Acute prostatitis resistant to PO abx ;  in setting of BPH with obstructive symptoms   WBCs improving   follow blood culture   ID consultation   will likely need iv abx at discharge   cw Meropenem  for now   started on PO Flomax ,cw Blackwell   Urology consultation       # D.M -2 ;  cw insulin Lantus   d/c oral meds   cw FS monitoring     #HTN ;   taking Enalapril 10 mg BID at home   DASH diet     # transaminitis ;  likely 2/2 abx   trend LFts    if trending up than consider US liver    # Hemolyzed BMP with high K +;  repeats CMP     # DVT ppx with Lovenox   DASH /crab consistent diet   ambulate as tolerated   FULL code     #dispo; after ID eval   will likely need iv abx at discharge     PLZ CONFIRM MEDS WITH [facility] ,WHICH IS CLOSED ON SUNDAY ,PT DON'T REMEMBER ALL MEDS . 58 yr old  M with  hx HTN, DM-2 , BPH presents as follow up for prostatitis ,found to have E colic resistant to PO abx on U/culture .    # Acute prostatitis resistant to PO abx ;  in setting of BPH with obstructive symptoms   WBCs improving 9<--14  Urine culture - Ecoli MDR   Blood culture Pending  ID - Continue Meropenem - d/c on IV Ertapenem  Pending on Duration will need PICC vs Midline - F/U ID  Cw w/ Flomax    Urology consultation - Continue Blackwell for now - Will follow up       # D.M -2 ;  cw insulin Lantus   d/c oral meds   cw FS monitoring     #HTN ;   taking Enalapril 10 mg BID at home   DASH diet     # transaminitis ;  likely 2/2 abx   trend LFts    if trending up than consider US liver    # Hemolyzed BMP with high K +; Resolved  Follow  CMP     # DVT ppx with Lovenox   DASH /crab consistent diet   ambulate as tolerated   #dispo; From home - after ID eval with IV abx duration   FULL code

## 2019-09-23 NOTE — PROGRESS NOTE ADULT - SUBJECTIVE AND OBJECTIVE BOX
SUBJECTIVE:   No complaints    *********************** VITALS ******************************************  Vital Signs Last 24 Hrs  T(C): 36.3 (23 Sep 2019 07:00), Max: 38.5 (23 Sep 2019 00:35)  T(F): 97.3 (23 Sep 2019 07:00), Max: 101.3 (23 Sep 2019 00:35)  HR: 65 (23 Sep 2019 07:00) (63 - 84)  BP: 130/71 (23 Sep 2019 07:00) (130/71 - 186/83)  BP(mean): --  RR: 18 (23 Sep 2019 07:00) (18 - 18)  SpO2: 96% (22 Sep 2019 15:55) (96% - 98%)    ******************************** PHYSICAL EXAM:**************************************************  GENERAL:  NAD, well developed - well nourished    PSYCH: no agitation     HEENT:  Sclerae anicteric, EOMI     NERVOUS SYSTEM:  Alert & Oriented X3     PULMONARY:  Lungs clear to auscultation bilaterally    CARDIOVASCULAR:  RRR, S1 S2 audible      ABDOMEN: Soft, NT, ND, no obvious palpable masses    EXTREMITIES:  warm and well perfused    LYMPH: No LAD    SKIN: No rashes                              12.4   7.36  )-----------( 243      ( 23 Sep 2019 05:53 )             35.4       09-23    132<L>  |  95<L>  |  15  ----------------------------<  272<H>  4.7   |  24  |  0.7    Ca    9.1      23 Sep 2019 05:53  Mg     2.1     09-23    TPro  6.7  /  Alb  3.7  /  TBili  0.6  /  DBili  0.2  /  AST  24  /  ALT  26  /  AlkPhos  132<H>  09-23      LIVER FUNCTIONS - ( 23 Sep 2019 05:53 )  Alb: 3.7 g/dL / Pro: 6.7 g/dL / ALK PHOS: 132 U/L / ALT: 26 U/L / AST: 24 U/L / GGT: x               	Culture - Urine (09.19.19 @ 04:30)  	  -  Amikacin: S <=16  	  -  Ampicillin: R >16 These ampicillin results predict results for amoxicillin  	  -  Ampicillin/Sulbactam: R 16/8 Enterobacter, Citrobacter, and Serratia may develop resistance during prolonged therapy (3-4 days)  	  -  Aztreonam: R >16  	  -  Cefazolin: R >16 (MIC_CL_COM_[email or web address]) For uncomplicated UTI with K. pneumoniae, E. coli, or P. mirablis: VIVI <=16 is sensitive and VIVI >=32 is resistant. This also predicts results for oral agents cefaclor, cefdinir, cefpodoxime, cefprozil, cefuroxime axetil, cephalexin and locarbef for uncomplicated UTI. Note that some isolates may be susceptible to these agents while testing resistant to cefazolin.  	  -  Cefepime: R >16  	  -  Cefoxitin: S <=8  	  -  Ceftriaxone: R >32 Enterobacter, Citrobacter, and Serratia may develop resistance during prolonged therapy  	  -  Ciprofloxacin: R >2  	  -  Ertapenem: S <=1  	  -  Gentamicin: R >8  	  -  Imipenem: S <=1  	  -  Levofloxacin: R >4  	  -  Meropenem: S <=1  	  -  Nitrofurantoin: S <=32 Should not be used to treat pyelonephritis  	  -  Piperacillin/Tazobactam: R <=16  	  -  Tigecycline: S <=2  	  -  Tobramycin: R >8  	  -  Trimethoprim/Sulfamethoxazole: S <=2/38  	  Specimen Source: .Urine Clean Catch (Midstream)  	  Culture Results:   	>100,000 CFU/ml Escherichia coli ESBL  	  Organism Identification: Escherichia coli ESBL  	  Organism: Escherichia coli ESBL  	  Method Type: VIVI

## 2019-09-23 NOTE — CONSULT NOTE ADULT - SUBJECTIVE AND OBJECTIVE BOX
ARTHUR DE LEÓN  58y, Male  Allergy: No Known Allergies      CHIEF COMPLAINT: worsening chills ,subjective fever ,found to have E - coli in urine ,resistant to Cipro (23 Sep 2019 12:43)      HPI:  58 yr old  M with  hx HTN, DM-2 , BPH presents as follow up for prostatitis.  Pt was seen in ED 3 days ago with urinary complaints, with urine hesitation ,and retention  and was diagnosed with prostatitis, sent home with bagley and PO Cipro 500 BID .  Pt returned because he wasn't able to make an appointment with urologist for 1 month. Pt continues to have some burning with voiding and states there is discharge at the penis around the bagley. Also c/o chills  and subjective fever .His urine culture grew E coli resistant to Cipro and ceftriaxone .  Otherwise, no abd pain, no back pain, no documented  fever, no N/V, no numbness or tingling.    In ER ,  pt was vitally stable   was given Meropenam   improvement in WBcs seen  Bagley changed in ER (22 Sep 2019 13:31)    FAMILY HISTORY:  FH: type 2 diabetes: mother .father .sisters    PAST MEDICAL & SURGICAL HISTORY:  BPH with urinary obstruction  Diabetes  HTN (hypertension)  No significant past surgical history      Substance Use ( x ) never used  (  ) IVDU (  ) Other:  Tobacco Usage:  ( x  ) never smoked   (   ) former smoker   (   ) current smoker   Alcohol Usage: (   ) social  (   ) daily use ( x  ) denies  Sexual History: NA      ROS  General: Denies fevers, chills, nightsweats, weight loss  HEENT: Denies headache, rhinorrhea, sore throat, eye pain  CV: Denies CP, palpitations  PULM: Denies SOB, cough  GI: Denies abdominal pain, diarrhea  : Denies dysuria, hematuria  MSK: Denies arthralgias  SKIN: Denies rash   NEURO: Denies paresthesias, weakness  PSYCH: Denies depression    VITALS:  T(F): 97.3, Max: 101.3 (19 @ 00:35)  HR: 65  BP: 130/71  RR: 18Vital Signs Last 24 Hrs  T(C): 36.3 (23 Sep 2019 07:00), Max: 38.5 (23 Sep 2019 00:35)  T(F): 97.3 (23 Sep 2019 07:00), Max: 101.3 (23 Sep 2019 00:35)  HR: 65 (23 Sep 2019 07:00) (63 - 84)  BP: 130/71 (23 Sep 2019 07:00) (130/71 - 186/83)  BP(mean): --  RR: 18 (23 Sep 2019 07:00) (18 - 18)  SpO2: 96% (22 Sep 2019 15:55) (96% - 98%)    PHYSICAL EXAM:  Gen: NAD, resting in bed  HEENT: Normocephalic, atraumatic  Neck: supple, no lymphadenopathy  CV: Regular rate & regular rhythm  Lungs: decreased BS at bases, no fremitus  Abdomen: Soft, BS present  Ext: Warm, well perfused  Neuro: non focal, awake  Skin: no rash, no erythema    TESTS & MEASUREMENTS:                        12.4   7.36  )-----------( 243      ( 23 Sep 2019 05:53 )             35.4         132<L>  |  95<L>  |  15  ----------------------------<  272<H>  4.7   |  24  |  0.7    Ca    9.1      23 Sep 2019 05:53  Mg     2.1         TPro  6.7  /  Alb  3.7  /  TBili  0.6  /  DBili  0.2  /  AST  24  /  ALT  26  /  AlkPhos  132<H>      eGFR if Non African American: 103 mL/min/1.73M2 (19 @ 05:53)  eGFR if African American: 120 mL/min/1.73M2 (19 @ 05:53)  eGFR if Non : 93 mL/min/1.73M2 (19 @ 22:38)  eGFR if : 108 mL/min/1.73M2 (19 @ 22:38)    LIVER FUNCTIONS - ( 23 Sep 2019 05:53 )  Alb: 3.7 g/dL / Pro: 6.7 g/dL / ALK PHOS: 132 U/L / ALT: 26 U/L / AST: 24 U/L / GGT: x           Urinalysis Basic - ( 22 Sep 2019 10:00 )    Color: Yellow / Appearance: Slightly Turbid / S.022 / pH: x  Gluc: x / Ketone: Moderate  / Bili: Negative / Urobili: <2 mg/dL   Blood: x / Protein: 30 mg/dL / Nitrite: Negative   Leuk Esterase: Large / RBC: >720 /HPF / WBC 65 /HPF   Sq Epi: x / Non Sq Epi: 18 /HPF / Bacteria: Negative        Culture - Urine (collected 19 @ 04:30)  Source: .Urine Clean Catch (Midstream)  Final Report (19 @ 09:08):    >100,000 CFU/ml Escherichia coli ESBL  Organism: Escherichia coli ESBL (19 @ 09:08)  Organism: Escherichia coli ESBL (19 @ 09:08)      -  Amikacin: S <=16      -  Ampicillin: R >16 These ampicillin results predict results for amoxicillin      -  Ampicillin/Sulbactam: R 16/8 Enterobacter, Citrobacter, and Serratia may develop resistance during prolonged therapy (3-4 days)      -  Aztreonam: R >16      -  Cefazolin: R >16 (MIC_CL_COM_ENTERIC_CEFAZU) For uncomplicated UTI with K. pneumoniae, E. coli, or P. mirablis: VIVI <=16 is sensitive and VIVI >=32 is resistant. This also predicts results for oral agents cefaclor, cefdinir, cefpodoxime, cefprozil, cefuroxime axetil, cephalexin and locarbef for uncomplicated UTI. Note that some isolates may be susceptible to these agents while testing resistant to cefazolin.      -  Cefepime: R >16      -  Cefoxitin: S <=8      -  Ceftriaxone: R >32 Enterobacter, Citrobacter, and Serratia may develop resistance during prolonged therapy      -  Ciprofloxacin: R >2      -  Ertapenem: S <=1      -  Gentamicin: R >8      -  Imipenem: S <=1      -  Levofloxacin: R >4      -  Meropenem: S <=1      -  Nitrofurantoin: S <=32 Should not be used to treat pyelonephritis      -  Piperacillin/Tazobactam: R <=16      -  Tigecycline: S <=2      -  Tobramycin: R >8      -  Trimethoprim/Sulfamethoxazole: S <=2/38      Method Type: VIVI            INFECTIOUS DISEASES TESTING      RADIOLOGY & ADDITIONAL TESTS:  I have personally reviewed the last Chest xray  CXR      CT      CARDIOLOGY TESTING      MEDICATIONS  chlorhexidine 4% Liquid 1  dextrose 50% Injectable 12.5  dextrose 50% Injectable 25  dextrose 50% Injectable 25  enalapril 10  enoxaparin Injectable 40  influenza   Vaccine 0.5  insulin glargine Injectable (LANTUS) 10  insulin lispro (HumaLOG) corrective regimen sliding scale   meropenem  IVPB 1000  tamsulosin 0.4      ANTIBIOTICS:  meropenem  IVPB 1000 milliGRAM(s) IV Intermittent every 8 hours      All available historical data has been reviewed

## 2019-09-23 NOTE — CONSULT NOTE ADULT - PROBLEM SELECTOR RECOMMENDATION 9
Flomax  Continue IV Abx Flomax  Continue IV Abx      Addendum :   Pt. seen and examined by Dr. Rae around 8:30 PM  Please D/C  Blackwell catheter and start CIC Q4-6H.  Start Flomax.   Cont. Abx as per ID   Monitor strict I&O  Dr. Rae will F/U

## 2019-09-24 ENCOUNTER — TRANSCRIPTION ENCOUNTER (OUTPATIENT)
Age: 59
End: 2019-09-24

## 2019-09-24 VITALS
SYSTOLIC BLOOD PRESSURE: 146 MMHG | RESPIRATION RATE: 18 BRPM | TEMPERATURE: 97 F | HEART RATE: 62 BPM | DIASTOLIC BLOOD PRESSURE: 69 MMHG

## 2019-09-24 LAB
ALBUMIN SERPL ELPH-MCNC: 3.6 G/DL — SIGNIFICANT CHANGE UP (ref 3.5–5.2)
ALP SERPL-CCNC: 123 U/L — HIGH (ref 30–115)
ALT FLD-CCNC: 23 U/L — SIGNIFICANT CHANGE UP (ref 0–41)
ANION GAP SERPL CALC-SCNC: 10 MMOL/L — SIGNIFICANT CHANGE UP (ref 7–14)
AST SERPL-CCNC: 20 U/L — SIGNIFICANT CHANGE UP (ref 0–41)
BASOPHILS # BLD AUTO: 0.04 K/UL — SIGNIFICANT CHANGE UP (ref 0–0.2)
BASOPHILS NFR BLD AUTO: 0.5 % — SIGNIFICANT CHANGE UP (ref 0–1)
BILIRUB SERPL-MCNC: 0.4 MG/DL — SIGNIFICANT CHANGE UP (ref 0.2–1.2)
BUN SERPL-MCNC: 20 MG/DL — SIGNIFICANT CHANGE UP (ref 10–20)
CALCIUM SERPL-MCNC: 8.9 MG/DL — SIGNIFICANT CHANGE UP (ref 8.5–10.1)
CHLORIDE SERPL-SCNC: 97 MMOL/L — LOW (ref 98–110)
CO2 SERPL-SCNC: 26 MMOL/L — SIGNIFICANT CHANGE UP (ref 17–32)
CREAT SERPL-MCNC: 0.7 MG/DL — SIGNIFICANT CHANGE UP (ref 0.7–1.5)
EOSINOPHIL # BLD AUTO: 0.25 K/UL — SIGNIFICANT CHANGE UP (ref 0–0.7)
EOSINOPHIL NFR BLD AUTO: 3 % — SIGNIFICANT CHANGE UP (ref 0–8)
GLUCOSE BLDC GLUCOMTR-MCNC: 226 MG/DL — HIGH (ref 70–99)
GLUCOSE BLDC GLUCOMTR-MCNC: 302 MG/DL — HIGH (ref 70–99)
GLUCOSE SERPL-MCNC: 254 MG/DL — HIGH (ref 70–99)
HCT VFR BLD CALC: 35.4 % — LOW (ref 42–52)
HGB BLD-MCNC: 11.9 G/DL — LOW (ref 14–18)
IMM GRANULOCYTES NFR BLD AUTO: 1.2 % — HIGH (ref 0.1–0.3)
LYMPHOCYTES # BLD AUTO: 1.88 K/UL — SIGNIFICANT CHANGE UP (ref 1.2–3.4)
LYMPHOCYTES # BLD AUTO: 22.8 % — SIGNIFICANT CHANGE UP (ref 20.5–51.1)
MCHC RBC-ENTMCNC: 30.4 PG — SIGNIFICANT CHANGE UP (ref 27–31)
MCHC RBC-ENTMCNC: 33.6 G/DL — SIGNIFICANT CHANGE UP (ref 32–37)
MCV RBC AUTO: 90.3 FL — SIGNIFICANT CHANGE UP (ref 80–94)
MONOCYTES # BLD AUTO: 0.68 K/UL — HIGH (ref 0.1–0.6)
MONOCYTES NFR BLD AUTO: 8.2 % — SIGNIFICANT CHANGE UP (ref 1.7–9.3)
NEUTROPHILS # BLD AUTO: 5.31 K/UL — SIGNIFICANT CHANGE UP (ref 1.4–6.5)
NEUTROPHILS NFR BLD AUTO: 64.3 % — SIGNIFICANT CHANGE UP (ref 42.2–75.2)
NRBC # BLD: 0 /100 WBCS — SIGNIFICANT CHANGE UP (ref 0–0)
PLATELET # BLD AUTO: 289 K/UL — SIGNIFICANT CHANGE UP (ref 130–400)
POTASSIUM SERPL-MCNC: 5 MMOL/L — SIGNIFICANT CHANGE UP (ref 3.5–5)
POTASSIUM SERPL-SCNC: 5 MMOL/L — SIGNIFICANT CHANGE UP (ref 3.5–5)
PROT SERPL-MCNC: 6.4 G/DL — SIGNIFICANT CHANGE UP (ref 6–8)
RBC # BLD: 3.92 M/UL — LOW (ref 4.7–6.1)
RBC # FLD: 12.1 % — SIGNIFICANT CHANGE UP (ref 11.5–14.5)
SODIUM SERPL-SCNC: 133 MMOL/L — LOW (ref 135–146)
WBC # BLD: 8.26 K/UL — SIGNIFICANT CHANGE UP (ref 4.8–10.8)
WBC # FLD AUTO: 8.26 K/UL — SIGNIFICANT CHANGE UP (ref 4.8–10.8)

## 2019-09-24 PROCEDURE — 99238 HOSP IP/OBS DSCHRG MGMT 30/<: CPT

## 2019-09-24 PROCEDURE — 99231 SBSQ HOSP IP/OBS SF/LOW 25: CPT

## 2019-09-24 RX ORDER — AZTREONAM 2 G
1 VIAL (EA) INJECTION
Qty: 84 | Refills: 0
Start: 2019-09-24

## 2019-09-24 RX ORDER — INSULIN GLARGINE 100 [IU]/ML
10 INJECTION, SOLUTION SUBCUTANEOUS
Qty: 0 | Refills: 0 | DISCHARGE

## 2019-09-24 RX ORDER — TAMSULOSIN HYDROCHLORIDE 0.4 MG/1
1 CAPSULE ORAL
Qty: 0 | Refills: 0 | DISCHARGE
Start: 2019-09-24

## 2019-09-24 RX ADMIN — MEROPENEM 100 MILLIGRAM(S): 1 INJECTION INTRAVENOUS at 14:03

## 2019-09-24 RX ADMIN — MEROPENEM 100 MILLIGRAM(S): 1 INJECTION INTRAVENOUS at 05:56

## 2019-09-24 RX ADMIN — Medication 8: at 12:29

## 2019-09-24 RX ADMIN — Medication 4: at 08:27

## 2019-09-24 RX ADMIN — Medication 9 UNIT(S): at 12:27

## 2019-09-24 RX ADMIN — Medication 9 UNIT(S): at 08:27

## 2019-09-24 RX ADMIN — Medication 10 MILLIGRAM(S): at 05:55

## 2019-09-24 NOTE — DISCHARGE NOTE PROVIDER - CARE PROVIDERS DIRECT ADDRESSES
,DirectAddress_Unknown,[email or web address]

## 2019-09-24 NOTE — DISCHARGE NOTE PROVIDER - NSDCFUADDINST_GEN_ALL_CORE_FT
Oliverio follow up with your urologist and primary care within 1 week. Followup with infectious disease in 4 weeks.   Take medication as prescribed.  If symptoms worsen please call your physician or return to ED. Oliverio follow up with your urologist and primary care within 1 week. Followup with infectious disease in 4 weeks.   Take medication as prescribed.  Take the Bactrim DS 1 tab twice a day for 4 weeks.   If symptoms worsen please call your physician or return to ED. PLesae follow up with your urologist and primary care within 1 week. Followup with infectious disease in 4 weeks.   Take medication as prescribed.  Take the Bactrim DS 1 tab twice a day for 4 weeks.   If symptoms worsen please call your physician or return to ED.    Rocío un seguimiento con potter urólogo y atención primaria dentro de 1 semana. Seguimiento con enfermedad infecciosa en 4 semanas.  [place] la medicación según lo prescrito.  [place] la pestaña Bactrim DS 1 dos veces al día dante 4 semanas.  Si los síntomas empeoran, llame a potter médico o regrese a la [place].

## 2019-09-24 NOTE — PROGRESS NOTE ADULT - ASSESSMENT
59y/o M with prostatitis and urinary retention     - continue IV Abx as per ID  - continue Flomax  - d/c bagley and allow for trial of void

## 2019-09-24 NOTE — DISCHARGE NOTE NURSING/CASE MANAGEMENT/SOCIAL WORK - PATIENT PORTAL LINK FT
You can access the FollowMyHealth Patient Portal offered by [facility] by registering at the following website: [email or web address]. By joining [employer]’s FollowMyHealth portal, you will also be able to view your health information using other applications (apps) compatible with our system.

## 2019-09-24 NOTE — DISCHARGE NOTE PROVIDER - CARE PROVIDER_API CALL
Eugene Daley)  Infectious Disease; Internal Medicine  [address] [place] [id number]  Phone: [phone]  Fax: [phone]  Follow Up Time:     Fiordaliza Rae)  Urology  [address] [facility] [address]  [place] [address]  Phone: [phone]  Fax: [phone]  Follow Up Time:     Lien Balbuena)  Internal Medicine  [address] [facility] [address]  [place] [address]  Phone: [phone]  Fax: [phone]  Follow Up Time:

## 2019-09-24 NOTE — DISCHARGE NOTE PROVIDER - NSDCCPCAREPLAN_GEN_ALL_CORE_FT
PRINCIPAL DISCHARGE DIAGNOSIS  Diagnosis: E coli infection  Assessment and Plan of Treatment: Prostatitis PRINCIPAL DISCHARGE DIAGNOSIS  Diagnosis: E coli infection  Assessment and Plan of Treatment: WHAT YOU NEED TO KNOW:  A urinary tract infection (UTI) is caused by bacteria that get inside your urinary tract. Most bacteria that enter your urinary tract come out when you urinate. If the bacteria stay in your urinary tract, you may get an infection. Your urinary tract includes your kidneys, ureters, bladder, and urethra. Urine is made in your kidneys, and it flows from the ureters to the bladder. Urine leaves the bladder through the urethra. A UTI is more common in your lower urinary tract, which includes your bladder and urethra.  Seek care immediately if:  •You are urinating very little or not at all.  •You have a high fever with shaking chills.  •You have side or back pain that gets worse.  Contact your healthcare provider if:  •You have a fever.  •You do not feel better after 2 days of taking antibiotics.  •You are vomiting.  •You have questions or concerns about your condition or care.  Prevent another UTI:  •Empty your bladder often. Urinate and empty your bladder as soon as you feel the need. Do not hold your urine for long periods of time.  •Drink liquids as directed. Ask how much liquid to drink each day and which liquids are best for you. You may need to drink more liquids than usual to help flush out the bacteria. Do not drink alcohol, caffeine, or citrus juices. These can irritate your bladder and increase your symptoms. Your healthcare provider may recommend cranberry juice to help prevent a UTI        SECONDARY DISCHARGE DIAGNOSES  Diagnosis: Prostatitis  Assessment and Plan of Treatment: You were also diagnosed with Prostatitis, an infection of your prostate. You are being treated with Antiobiotics for this infection as well. The Antibioitcs are the same one as for the Urinary Tract Infection.  See above and below for direction and information relating to your infection.    Diagnosis: E-coli UTI  Assessment and Plan of Treatment: LO QUE NECESITAS SABER:  Mariola infección del tracto urinario (ITU) es causada por mariola bacteria que ingresa al tracto urinario. La mayoría de las bacterias que ingresan al tracto urinario salen cuando orina. Si la bacteria permanece en potter tracto urinario, puede contraer mariola infección. Potter tracto urinario incluye ras riñones, uréteres, vejiga y uretra. La orina se produce en los riñones y fluye desde los uréteres hasta la vejiga. La orina sale de la vejiga a través de la uretra. Mariola infección urinaria es más común en el tracto urinario inferior, que incluye la vejiga y la uretra.  Busque atención inmediata si:  • Orina muy poco o nada.  • Tiene fiebre jessica con escalofríos.  • Tiene dolor lateral o de espalda que empeora.  Póngase en contacto con potter proveedor de atención médica si:  •Tienes fiebre.  • No se siente mejor después de 2 días de flor antibióticos.  • Estás vomitando.  • Tiene preguntas o inquietudes sobre potter afección o atención.  Prevenir otra infección urinaria:  • Vacíe la vejiga con frecuencia. Orine y vacíe potter vejiga tan pronto lauren sienta la necesidad. No retenga la orina por largos períodos de tiempo.  • Sallie líquidos según las indicaciones. Pregunte cuánto líquido debe flor cada día y qué líquidos son mejores para usted. Es posible que necesite beber más líquidos de lo habitual para ayudar a eliminar las bacterias. No tome alcohol, cafeína o jugos cítricos. Estos pueden irritar potter vejiga y aumentar ras síntomas. Potter proveedor de atención médica puede recomendarle jugo de arándano para ayudar a prevenir mariola infección urinaria.

## 2019-09-24 NOTE — DISCHARGE NOTE PROVIDER - NSDCFUSCHEDAPPT_GEN_ALL_CORE_FT
ARTHUR DE LEÓN ; 10/03/2019 ; P Urology [address] [facility] [place]  ARTHUR DE LEÓN ; 10/21/2019 ; NP Urology [address] [facility] [address] ARTHUR DE LEÓN ; 10/03/2019 ; P Urology [address] [facility] [place]  ARTHUR DE LEÓN ; 10/21/2019 ; NP Urology [address] [facility] [address] ARTHUR DE LEÓN ; 10/03/2019 ; P Urology [address] [facility] [place]  ARTHUR DE LEÓN ; 10/21/2019 ; NP Urology [address] [facility] [address] ARTHUR DE LEÓN ; 10/03/2019 ; P Urology [address] [facility] [place]  ARTHUR DE LEÓN ; 10/21/2019 ; NP Urology [address] [facility] [address]

## 2019-09-24 NOTE — PROGRESS NOTE ADULT - SUBJECTIVE AND OBJECTIVE BOX
*This is a Preliminary Note and will be edited*  SUBJECTIVE:    Patient is a 58y old Male who presents with a chief complaint of worsening chills ,subjective fever ,found to have E - coli in urine ,resistant to Cipro (23 Sep 2019 13:33)   PMH**  Currently admitted to medicine with the primary diagnosis of E coli infection     Today is hospital day 2d. This morning he is resting comfortably in bed and reports no new** issues or overnight events.   Pt. denies HA, Cp, Abd Pain or discomfort.**  Pt. is urinating and stooling appropriately.**    PAST MEDICAL & SURGICAL HISTORY  BPH with urinary obstruction  Diabetes  HTN (hypertension)  No significant past surgical history    SOCIAL HISTORY:  Negative for smoking/alcohol/drug use.     ALLERGIES:  No Known Allergies    MEDICATIONS:  STANDING MEDICATIONS  acetaminophen   Tablet .. 650 milliGRAM(s) Oral every 6 hours PRN Temp greater or equal to 38.5C (101.3F), Mild Pain (1 - 3)  chlorhexidine 4% Liquid 1 Application(s) Topical <User Schedule>  dextrose 40% Gel 15 Gram(s) Oral once PRN Blood Glucose LESS THAN 70 milliGRAM(s)/deciliter  dextrose 5%. 1000 milliLiter(s) (50 mL/Hr) IV Continuous <Continuous>  dextrose 50% Injectable 12.5 Gram(s) IV Push once  dextrose 50% Injectable 25 Gram(s) IV Push once  dextrose 50% Injectable 25 Gram(s) IV Push once  enalapril 10 milliGRAM(s) Oral two times a day  enoxaparin Injectable 40 milliGRAM(s) SubCutaneous at bedtime  glucagon  Injectable 1 milliGRAM(s) IntraMuscular once PRN Glucose LESS THAN 70 milligrams/deciliter  influenza   Vaccine 0.5 milliLiter(s) IntraMuscular once  insulin glargine Injectable (LANTUS) 21 Unit(s) SubCutaneous at bedtime  insulin lispro (HumaLOG) corrective regimen sliding scale   SubCutaneous three times a day before meals  insulin lispro Injectable (HumaLOG) 9 Unit(s) SubCutaneous three times a day before meals  meropenem  IVPB 1000 milliGRAM(s) IV Intermittent every 8 hours  tamsulosin 0.4 milliGRAM(s) Oral at bedtime    PRN MEDICATIONS  acetaminophen   Tablet .. 650 milliGRAM(s) Oral every 6 hours PRN  dextrose 40% Gel 15 Gram(s) Oral once PRN  glucagon  Injectable 1 milliGRAM(s) IntraMuscular once PRN    VITALS:   T(F): 97.7  HR: 72 (67 - 72)  BP: 151/72 (141/63 - 151/72)  RR: 18 (18 - 18)  SpO2: --    LABS:                        11.9   8.26  )-----------( 289      ( 24 Sep 2019 06:42 )             35.4         132<L>  |  95<L>  |  15  ----------------------------<  272<H>  4.7   |  24  |  0.7    Ca    9.1      23 Sep 2019 05:53  Mg     2.1         TPro  6.7  /  Alb  3.7  /  TBili  0.6  /  DBili  0.2  /  AST  24  /  ALT  26  /  AlkPhos  132<H>        Urinalysis Basic - ( 22 Sep 2019 10:00 )    Color: Yellow / Appearance: Slightly Turbid / S.022 / pH: x  Gluc: x / Ketone: Moderate  / Bili: Negative / Urobili: <2 mg/dL   Blood: x / Protein: 30 mg/dL / Nitrite: Negative   Leuk Esterase: Large / RBC: >720 /HPF / WBC 65 /HPF   Sq Epi: x / Non Sq Epi: 18 /HPF / Bacteria: Negative            Culture - Urine (collected 22 Sep 2019 10:00)  Source: .Urine Catheterized  Final Report (23 Sep 2019 18:28):    <10,000 CFU/mL Normal Urogenital Ami              RADIOLOGY:    PHYSICAL EXAM:  GEN: In no acute distress. Pt. is awake in bed able to have a conversation.  LUNGS: CTABL, Symmetrical inspiration, no increased work of breathing  HEART: +S1,S2, RRR, No murmurs, Rubs, Gallops   ABD: Bowel Sounds Present, Soft, non tender, non distended, no guarding, no rebound.   EXT: 2+ peripheral Pulses, no clubbing, no cyanosis, no Edema.   NEURO: AAOX3. No focal deficits. CN 2-12 Grossly intact. SUBJECTIVE:    Patient is a 58y old Male who presents with a chief complaint of worsening chills ,subjective fever ,found to have E - coli in urine ,resistant to Cipro (23 Sep 2019 13:33)   PMH**  Currently admitted to medicine with the primary diagnosis of E coli infection     Today is hospital day 2d. This morning he is resting comfortably in bed and reports no new issues or overnight events.   Pt. denies HA, Cp, Abd Pain or discomfort.  Pt. is urinating and stooling appropriately.  PAST MEDICAL & SURGICAL HISTORY  BPH with urinary obstruction  Diabetes  HTN (hypertension)  No significant past surgical history    SOCIAL HISTORY:  Negative for smoking/alcohol/drug use.     ALLERGIES:  No Known Allergies    MEDICATIONS:  STANDING MEDICATIONS  acetaminophen   Tablet .. 650 milliGRAM(s) Oral every 6 hours PRN Temp greater or equal to 38.5C (101.3F), Mild Pain (1 - 3)  chlorhexidine 4% Liquid 1 Application(s) Topical <User Schedule>  dextrose 40% Gel 15 Gram(s) Oral once PRN Blood Glucose LESS THAN 70 milliGRAM(s)/deciliter  dextrose 5%. 1000 milliLiter(s) (50 mL/Hr) IV Continuous <Continuous>  dextrose 50% Injectable 12.5 Gram(s) IV Push once  dextrose 50% Injectable 25 Gram(s) IV Push once  dextrose 50% Injectable 25 Gram(s) IV Push once  enalapril 10 milliGRAM(s) Oral two times a day  enoxaparin Injectable 40 milliGRAM(s) SubCutaneous at bedtime  glucagon  Injectable 1 milliGRAM(s) IntraMuscular once PRN Glucose LESS THAN 70 milligrams/deciliter  influenza   Vaccine 0.5 milliLiter(s) IntraMuscular once  insulin glargine Injectable (LANTUS) 21 Unit(s) SubCutaneous at bedtime  insulin lispro (HumaLOG) corrective regimen sliding scale   SubCutaneous three times a day before meals  insulin lispro Injectable (HumaLOG) 9 Unit(s) SubCutaneous three times a day before meals  meropenem  IVPB 1000 milliGRAM(s) IV Intermittent every 8 hours  tamsulosin 0.4 milliGRAM(s) Oral at bedtime    PRN MEDICATIONS  acetaminophen   Tablet .. 650 milliGRAM(s) Oral every 6 hours PRN  dextrose 40% Gel 15 Gram(s) Oral once PRN  glucagon  Injectable 1 milliGRAM(s) IntraMuscular once PRN    VITALS:   T(F): 97.7  HR: 72 (67 - 72)  BP: 151/72 (141/63 - 151/72)  RR: 18 (18 - 18)  SpO2: --    LABS:                        11.9   8.26  )-----------( 289      ( 24 Sep 2019 06:42 )             35.4         132<L>  |  95<L>  |  15  ----------------------------<  272<H>  4.7   |  24  |  0.7    Ca    9.1      23 Sep 2019 05:53  Mg     2.1         TPro  6.7  /  Alb  3.7  /  TBili  0.6  /  DBili  0.2  /  AST  24  /  ALT  26  /  AlkPhos  132<H>        Urinalysis Basic - ( 22 Sep 2019 10:00 )    Color: Yellow / Appearance: Slightly Turbid / S.022 / pH: x  Gluc: x / Ketone: Moderate  / Bili: Negative / Urobili: <2 mg/dL   Blood: x / Protein: 30 mg/dL / Nitrite: Negative   Leuk Esterase: Large / RBC: >720 /HPF / WBC 65 /HPF   Sq Epi: x / Non Sq Epi: 18 /HPF / Bacteria: Negative            Culture - Urine (collected 22 Sep 2019 10:00)  Source: .Urine Catheterized  Final Report (23 Sep 2019 18:28):    <10,000 CFU/mL Normal Urogenital Ami              RADIOLOGY:    PHYSICAL EXAM:  GEN: In no acute distress. Pt. is awake in bed able to have a conversation.  LUNGS: CTABL, Symmetrical inspiration, no increased work of breathing  HEART: +S1,S2, RRR, No murmurs, Rubs, Gallops   ABD: Bowel Sounds Present, Soft, non tender, non distended, no guarding, no rebound. No tenderness to deep palpation over bladder  EXT: 2+ peripheral Pulses, no clubbing, no cyanosis, no Edema.   NEURO: AAOX3. No focal deficits. CN 2-12 Grossly intact.

## 2019-09-24 NOTE — PROGRESS NOTE ADULT - ASSESSMENT
58 yr old  M with  hx HTN, DM-2 , BPH presents as follow up for prostatitis ,found to have E colic resistant to PO abx on U/culture .    # Acute prostatitis resistant to PO abx ;  in setting of BPH with obstructive symptoms   WBCs improving 9<--14  Urine culture - Ecoli MDR   Blood culture Pending  ID - Continue Meropenem - d/c on IV Ertapenem  Pending on Duration will need PICC vs Midline - F/U ID  Cw w/ Flomax    Urology consultation - Continue Blackwell for now - Will follow up       # D.M -2 ;  cw insulin Lantus   d/c oral meds   cw FS monitoring     #HTN ;   taking Enalapril 10 mg BID at home   DASH diet     # transaminitis ;  likely 2/2 abx   trend LFts    if trending up than consider US liver    # Hemolyzed BMP with high K +; Resolved  Follow  CMP     # DVT ppx with Lovenox   DASH /crab consistent diet   ambulate as tolerated   #dispo; From home - after ID eval with IV abx duration   FULL code 58 yr old  M with  hx HTN, DM-2 , BPH presents as follow up for prostatitis ,found to have E colic resistant to PO abx on U/culture .    # Acute prostatitis resistant to PO abx ;  in setting of BPH with obstructive symptoms   WBCs improving 9<--14  Urine culture - Ecoli MDR   Blood culture Pending  ID - SPoke with Dr. Daley - Based on Sensitivities - Can Dc on Oral Bactrim 4 weeks.   Cw w/ Flomax    Urology consultation - dc bagley - TOV.       # D.M -2 ;  cw insulin Lantus   d/c oral meds   cw FS monitoring     #HTN ;   taking Enalapril 10 mg BID at home   DASH diet     # transaminitis ;  likely 2/2 abx   trend LFts    if trending up than consider US liver    # Hemolyzed BMP with high K +; Resolved  Follow  CMP     # DVT ppx with Lovenox   DASH /crab consistent diet   ambulate as tolerated   #dispo; From home - after ID eval with IV abx duration   FULL code

## 2019-09-24 NOTE — DISCHARGE NOTE PROVIDER - NSDCFUADDAPPT_GEN_ALL_CORE_FT
Follow up with your PMD in 1-2 weeks. Please discuss with your PMD your overall condition specifically surrounding your current situation.   Follow up with Dr. Daley from the Infectious Disease Department in 1-2 weeks. Please discuss at this time any further management regarding your infection and the need for any Antibiotics.  Follow up with Dr. Rae from the Urology Department in 1-2 weeks. Please discuss at this time any further management regarding your Urinary retention and symptoms with Urination. Follow up with your PMD in 1-2 weeks. Please discuss with your PMD your overall condition specifically surrounding your current situation.   Follow up with Dr. Daley from the Infectious Disease Department in 1-2 weeks. Please discuss at this time any further management regarding your infection and the need for any Antibiotics.  Follow up with Dr. Rae from the Urology Department in 1-2 weeks. Please discuss at this time any further management regarding your Urinary retention and symptoms with Urination.      Rocío un seguimiento con potter PMD en 1-2 semanas. Discuta con potter PMD potter condición general que rodea específicamente potter situación actual.  Rocío un seguimiento con el Dr. Daley del Departamento de Enfermedades Infecciosas en 1-2 semanas. Por favor, discuta en rolando momento cualquier tratamiento adicional con respecto a potter infección y la necesidad de antibióticos.  Rocío un seguimiento con el Dr. Rae del Departamento de Urología en 1-2 semanas. Por favor, discuta en rolando momento cualquier tratamiento adicional con respecto a potter retención urinaria y síntomas con micción.

## 2019-09-24 NOTE — PROGRESS NOTE ADULT - SUBJECTIVE AND OBJECTIVE BOX
Progress Note    Subjective  Pt is a 57y/o M with prostatitis and urinary retention. Pt seen and examined at bedside. No acute complaints at this time, feeling well.     Vital signs  T(C): , Max: 36.8 (09-23-19 @ 15:00)  HR: 62 (09-24-19 @ 07:25)  BP: 126/72 (09-24-19 @ 07:25)    Gen: awake, alert, NAD, lying in bed comfortably  Abd: soft, nontender, nondistended  : Blackwell catheter in place draining clear yellow urine.    Labs                        11.9   8.26  )-----------( 289      ( 24 Sep 2019 06:42 )             35.4     24 Sep 2019 06:42    133    |  97     |  20     ----------------------------<  254    5.0     |  26     |  0.7      Ca    8.9        24 Sep 2019 06:42  Mg     2.1       23 Sep 2019 05:53

## 2019-09-24 NOTE — DISCHARGE NOTE NURSING/CASE MANAGEMENT/SOCIAL WORK - NSDCFUADDAPPT_GEN_ALL_CORE_FT
Follow up with your PMD in 1-2 weeks. Please discuss with your PMD your overall condition specifically surrounding your current situation.   Follow up with Dr. Daley from the Infectious Disease Department in 1-2 weeks. Please discuss at this time any further management regarding your infection and the need for any Antibiotics.  Follow up with Dr. Rae from the Urology Department in 1-2 weeks. Please discuss at this time any further management regarding your Urinary retention and symptoms with Urination.      Rocío un seguimiento con potter PMD en 1-2 semanas. Discuta con potter PMD potter condición general que rodea específicamente potter situación actual.  Rocío un seguimiento con el Dr. Daley del [facility] de Enfermedades Infecciosas en 1-2 semanas. Por favor, discuta en rolando momento cualquier tratamiento adicional con respecto a potter infección y la necesidad de antibióticos.  Rocío un seguimiento con el Dr. Rae del Departamento de Urología en 1-2 semanas. Por favor, discuta en rolando momento cualquier tratamiento adicional con respecto a potter retención urinaria y síntomas con micción.

## 2019-09-24 NOTE — DISCHARGE NOTE PROVIDER - PROVIDER TOKENS
PROVIDER:[TOKEN:[[id number]]],PROVIDER:[TOKEN:[[id number]]],PROVIDER:[TOKEN:[[id number]]]

## 2019-09-24 NOTE — PROGRESS NOTE ADULT - ASSESSMENT
58M w HTN, DM-2, BPH presents w prostatitis ,found to have E coli resistant to PO abx on Ucx    # Acute prostatitis/UTI d/t MDR E.coli   ID consult indicated w can switch to PO ABX (Bactrim) for 4 week course  cont on PO Flomax    can DC home today    # BPH  # Acute urinary retention  Urology following  agrees w flomax & treating UTI  outpatient close urologist follow up    # D.MII  resume outpatient home regimen    #HTN    cont ACE  DASH diet     LFTs look ok.  elevated alk phos can be managed outpatient w PCP      #Progress Note Handoff    Pending (specify):  none    Family discussion: ptaao3/3    Disposition: Home_x__/SNF___/Other________/Unknown at this time________  today 58M w HTN, DM-2, BPH presents w prostatitis ,found to have E coli resistant to PO abx on Ucx    # Acute prostatitis/UTI d/t MDR E.coli   ID consult indicated w can switch to PO ABX (Bactrim) for 6 week course  cont on PO Flomax    can DC home today    # BPH  # Acute urinary retention  Urology following  agrees w flomax & treating UTI  outpatient close urologist follow up    # D.MII  resume outpatient home regimen    #HTN    cont ACE  DASH diet     LFTs look ok.  elevated alk phos can be managed outpatient w PCP      #Progress Note Handoff    Pending (specify):  none    Family discussion: ptaao3/3    Disposition: Home_x__/SNF___/Other________/Unknown at this time________  today

## 2019-09-24 NOTE — DISCHARGE NOTE PROVIDER - HOSPITAL COURSE
58 yr old  M with  hx HTN, DM-2 , BPH presents as follow up for prostatitis. Pt was seen in ED 3 days ago with urinary complaints, with urine hesitation ,and retention  and was diagnosed with prostatitis, sent home with bagley and PO Cipro 500 BID .    Pt returned because he wasn't able to make an appointment with urologist for 1 month. Pt continues to have some burning with voiding and states there is discharge at the penis around the bagley. Also c/o chills  and subjective fever .His urine culture grew E coli resistant to Cipro and ceftriaxone .    Patient seen by urology and discontinued bagley and recommended CIC and Flomax. Patient was started on meropenem, seen by ID and approved discharge on Bactrim. 58 yr old  M with  hx HTN, DM-2 , BPH presents as follow up for prostatitis. Pt was seen in ED 3 days ago with urinary complaints, with urine hesitation ,and retention  and was diagnosed with prostatitis, sent home with bagley and PO Cipro 500 BID .    Pt returned because he wasn't able to make an appointment with urologist for 1 month. Pt continues to have some burning with voiding and states there is discharge at the penis around the bagley. Also c/o chills  and subjective fever .His urine culture grew E coli resistant to Cipro and ceftriaxone .    Patient seen by urology and discontinued bagley and recommended CIC and Flomax. Patient was started on meropenem, seen by ID and approved discharge on Bactrim for 6 weeks course PO.

## 2019-09-24 NOTE — PROGRESS NOTE ADULT - REASON FOR ADMISSION
worsening chills ,subjective fever ,found to have E - coli in urine ,resistant to Cipro

## 2019-09-24 NOTE — PROGRESS NOTE ADULT - SUBJECTIVE AND OBJECTIVE BOX
SUBJECTIVE:   No complaints.  Feels much better and able to void , although he went 5x this today so far.    *********************** VITALS ******************************************  Vital Signs Last 24 Hrs  T(C): 36.6 (24 Sep 2019 07:25), Max: 36.8 (23 Sep 2019 15:00)  T(F): 97.8 (24 Sep 2019 07:25), Max: 98.3 (23 Sep 2019 15:00)  HR: 62 (24 Sep 2019 07:25) (62 - 72)  BP: 126/72 (24 Sep 2019 07:25) (126/72 - 151/72)  BP(mean): --  RR: 18 (24 Sep 2019 07:25) (18 - 18)  SpO2: --    ******************************** PHYSICAL EXAM:**************************************************  GENERAL:  NAD     PSYCH: no agitation     HEENT:  EOMI     NERVOUS SYSTEM:  Alert & Oriented X3     PULMONARY:  Lungs clear to auscultation bilaterally    CARDIOVASCULAR:  RRR, S1 S2 audible      ABDOMEN: Soft, NT, ND, no obvious palpable masses    EXTREMITIES:  warm and well perfused       LABS:                           11.9   8.26  )-----------( 289      ( 24 Sep 2019 06:42 )             35.4     09-24    133<L>  |  97<L>  |  20  ----------------------------<  254<H>  5.0   |  26  |  0.7    Ca    8.9      24 Sep 2019 06:42  Mg     2.1     09-23    TPro  6.4  /  Alb  3.6  /  TBili  0.4  /  DBili  x   /  AST  20  /  ALT  23  /  AlkPhos  123<H>  09-24      Culture - Blood (collected 23 Sep 2019 05:53)  Source: .Blood None  Preliminary Report (24 Sep 2019 14:01):    No growth to date.    Culture - Urine (collected 22 Sep 2019 10:00)  Source: .Urine Catheterized  Final Report (23 Sep 2019 18:28):    <10,000 CFU/mL Normal Urogenital Ami        	Culture - Urine (09.19.19 @ 04:30)  	  -  Amikacin: S <=16  	  -  Ampicillin: R >16 These ampicillin results predict results for amoxicillin  	  -  Ampicillin/Sulbactam: R 16/8 Enterobacter, Citrobacter, and Serratia may develop resistance during prolonged therapy (3-4 days)  	  -  Aztreonam: R >16  	  -  Cefazolin: R >16 (MIC_CL_COM_ENTERIC_CEFAZU) For uncomplicated UTI with K. pneumoniae, E. coli, or P. mirablis: VIVI <=16 is sensitive and VIVI >=32 is resistant. This also predicts results for oral agents cefaclor, cefdinir, cefpodoxime, cefprozil, cefuroxime axetil, cephalexin and locarbef for uncomplicated UTI. Note that some isolates may be susceptible to these agents while testing resistant to cefazolin.  	  -  Cefepime: R >16  	  -  Cefoxitin: S <=8  	  -  Ceftriaxone: R >32 Enterobacter, Citrobacter, and Serratia may develop resistance during prolonged therapy  	  -  Ciprofloxacin: R >2  	  -  Ertapenem: S <=1  	  -  Gentamicin: R >8  	  -  Imipenem: S <=1  	  -  Levofloxacin: R >4  	  -  Meropenem: S <=1  	  -  Nitrofurantoin: S <=32 Should not be used to treat pyelonephritis  	  -  Piperacillin/Tazobactam: R <=16  	  -  Tigecycline: S <=2  	  -  Tobramycin: R >8  	  -  Trimethoprim/Sulfamethoxazole: S <=2/38  	  Specimen Source: .Urine Clean Catch (Midstream)  	  Culture Results:   	>100,000 CFU/ml Escherichia coli ESBL  	  Organism Identification: Escherichia coli ESBL  	  Organism: Escherichia coli ESBL  	  Method Type: VIVI

## 2019-09-26 ENCOUNTER — EMERGENCY (EMERGENCY)
Facility: HOSPITAL | Age: 59
LOS: 0 days | Discharge: HOME | End: 2019-09-27
Attending: EMERGENCY MEDICINE | Admitting: EMERGENCY MEDICINE
Payer: MEDICAID

## 2019-09-26 VITALS
HEART RATE: 69 BPM | SYSTOLIC BLOOD PRESSURE: 158 MMHG | DIASTOLIC BLOOD PRESSURE: 69 MMHG | TEMPERATURE: 98 F | RESPIRATION RATE: 18 BRPM | OXYGEN SATURATION: 100 %

## 2019-09-26 VITALS
TEMPERATURE: 98 F | RESPIRATION RATE: 18 BRPM | OXYGEN SATURATION: 100 % | DIASTOLIC BLOOD PRESSURE: 83 MMHG | SYSTOLIC BLOOD PRESSURE: 142 MMHG | HEART RATE: 77 BPM

## 2019-09-26 DIAGNOSIS — R33.9 RETENTION OF URINE, UNSPECIFIED: ICD-10-CM

## 2019-09-26 DIAGNOSIS — E87.1 HYPO-OSMOLALITY AND HYPONATREMIA: ICD-10-CM

## 2019-09-26 DIAGNOSIS — E11.9 TYPE 2 DIABETES MELLITUS WITHOUT COMPLICATIONS: ICD-10-CM

## 2019-09-26 DIAGNOSIS — Z79.4 LONG TERM (CURRENT) USE OF INSULIN: ICD-10-CM

## 2019-09-26 DIAGNOSIS — R74.0 NONSPECIFIC ELEVATION OF LEVELS OF TRANSAMINASE AND LACTIC ACID DEHYDROGENASE [LDH]: ICD-10-CM

## 2019-09-26 DIAGNOSIS — N41.0 ACUTE PROSTATITIS: ICD-10-CM

## 2019-09-26 DIAGNOSIS — R39.11 HESITANCY OF MICTURITION: ICD-10-CM

## 2019-09-26 DIAGNOSIS — N13.8 OTHER OBSTRUCTIVE AND REFLUX UROPATHY: ICD-10-CM

## 2019-09-26 DIAGNOSIS — B96.20 UNSPECIFIED ESCHERICHIA COLI [E. COLI] AS THE CAUSE OF DISEASES CLASSIFIED ELSEWHERE: ICD-10-CM

## 2019-09-26 DIAGNOSIS — N40.1 BENIGN PROSTATIC HYPERPLASIA WITH LOWER URINARY TRACT SYMPTOMS: ICD-10-CM

## 2019-09-26 DIAGNOSIS — N41.9 INFLAMMATORY DISEASE OF PROSTATE, UNSPECIFIED: ICD-10-CM

## 2019-09-26 DIAGNOSIS — Z91.19 PATIENT'S NONCOMPLIANCE WITH OTHER MEDICAL TREATMENT AND REGIMEN: ICD-10-CM

## 2019-09-26 DIAGNOSIS — R33.8 OTHER RETENTION OF URINE: ICD-10-CM

## 2019-09-26 DIAGNOSIS — I10 ESSENTIAL (PRIMARY) HYPERTENSION: ICD-10-CM

## 2019-09-26 DIAGNOSIS — Z16.24 RESISTANCE TO MULTIPLE ANTIBIOTICS: ICD-10-CM

## 2019-09-26 DIAGNOSIS — N39.0 URINARY TRACT INFECTION, SITE NOT SPECIFIED: ICD-10-CM

## 2019-09-26 LAB
ALBUMIN SERPL ELPH-MCNC: 4.4 G/DL — SIGNIFICANT CHANGE UP (ref 3.5–5.2)
ALP SERPL-CCNC: 164 U/L — HIGH (ref 30–115)
ALT FLD-CCNC: 26 U/L — SIGNIFICANT CHANGE UP (ref 0–41)
ANION GAP SERPL CALC-SCNC: 15 MMOL/L — HIGH (ref 7–14)
ANISOCYTOSIS BLD QL: SLIGHT — SIGNIFICANT CHANGE UP
APPEARANCE UR: ABNORMAL
AST SERPL-CCNC: 33 U/L — SIGNIFICANT CHANGE UP (ref 0–41)
BACTERIA # UR AUTO: NEGATIVE — SIGNIFICANT CHANGE UP
BASOPHILS # BLD AUTO: 0 K/UL — SIGNIFICANT CHANGE UP (ref 0–0.2)
BASOPHILS NFR BLD AUTO: 0 % — SIGNIFICANT CHANGE UP (ref 0–1)
BILIRUB SERPL-MCNC: 0.4 MG/DL — SIGNIFICANT CHANGE UP (ref 0.2–1.2)
BILIRUB UR-MCNC: NEGATIVE — SIGNIFICANT CHANGE UP
BUN SERPL-MCNC: 17 MG/DL — SIGNIFICANT CHANGE UP (ref 10–20)
CALCIUM SERPL-MCNC: 9.9 MG/DL — SIGNIFICANT CHANGE UP (ref 8.5–10.1)
CHLORIDE SERPL-SCNC: 96 MMOL/L — LOW (ref 98–110)
CO2 SERPL-SCNC: 20 MMOL/L — SIGNIFICANT CHANGE UP (ref 17–32)
COLOR SPEC: ABNORMAL
CREAT SERPL-MCNC: 0.9 MG/DL — SIGNIFICANT CHANGE UP (ref 0.7–1.5)
DIFF PNL FLD: ABNORMAL
EOSINOPHIL # BLD AUTO: 0.26 K/UL — SIGNIFICANT CHANGE UP (ref 0–0.7)
EOSINOPHIL NFR BLD AUTO: 1.7 % — SIGNIFICANT CHANGE UP (ref 0–8)
EPI CELLS # UR: 7 /HPF — HIGH (ref 0–5)
GLUCOSE SERPL-MCNC: 253 MG/DL — HIGH (ref 70–99)
GLUCOSE UR QL: ABNORMAL
HCT VFR BLD CALC: 38.2 % — LOW (ref 42–52)
HGB BLD-MCNC: 13.3 G/DL — LOW (ref 14–18)
HYALINE CASTS # UR AUTO: 0 /LPF — SIGNIFICANT CHANGE UP (ref 0–7)
KETONES UR-MCNC: NEGATIVE — SIGNIFICANT CHANGE UP
LACTATE SERPL-SCNC: 1.8 MMOL/L — SIGNIFICANT CHANGE UP (ref 0.5–2.2)
LEUKOCYTE ESTERASE UR-ACNC: NEGATIVE — SIGNIFICANT CHANGE UP
LYMPHOCYTES # BLD AUTO: 14.8 % — LOW (ref 20.5–51.1)
LYMPHOCYTES # BLD AUTO: 2.26 K/UL — SIGNIFICANT CHANGE UP (ref 1.2–3.4)
MANUAL SMEAR VERIFICATION: SIGNIFICANT CHANGE UP
MCHC RBC-ENTMCNC: 30.8 PG — SIGNIFICANT CHANGE UP (ref 27–31)
MCHC RBC-ENTMCNC: 34.8 G/DL — SIGNIFICANT CHANGE UP (ref 32–37)
MCV RBC AUTO: 88.4 FL — SIGNIFICANT CHANGE UP (ref 80–94)
METAMYELOCYTES # FLD: 0.9 % — HIGH (ref 0–0)
MONOCYTES # BLD AUTO: 0.93 K/UL — HIGH (ref 0.1–0.6)
MONOCYTES NFR BLD AUTO: 6.1 % — SIGNIFICANT CHANGE UP (ref 1.7–9.3)
NEUTROPHILS # BLD AUTO: 11.14 K/UL — HIGH (ref 1.4–6.5)
NEUTROPHILS NFR BLD AUTO: 73 % — SIGNIFICANT CHANGE UP (ref 42.2–75.2)
NITRITE UR-MCNC: NEGATIVE — SIGNIFICANT CHANGE UP
OVALOCYTES BLD QL SMEAR: SLIGHT — SIGNIFICANT CHANGE UP
PH UR: 6.5 — SIGNIFICANT CHANGE UP (ref 5–8)
PLAT MORPH BLD: NORMAL — SIGNIFICANT CHANGE UP
PLATELET # BLD AUTO: 507 K/UL — HIGH (ref 130–400)
POLYCHROMASIA BLD QL SMEAR: SLIGHT — SIGNIFICANT CHANGE UP
POTASSIUM SERPL-MCNC: 5.2 MMOL/L — HIGH (ref 3.5–5)
POTASSIUM SERPL-SCNC: 5.2 MMOL/L — HIGH (ref 3.5–5)
PROT SERPL-MCNC: 8 G/DL — SIGNIFICANT CHANGE UP (ref 6–8)
PROT UR-MCNC: ABNORMAL
RBC # BLD: 4.32 M/UL — LOW (ref 4.7–6.1)
RBC # FLD: 11.8 % — SIGNIFICANT CHANGE UP (ref 11.5–14.5)
RBC BLD AUTO: NORMAL — SIGNIFICANT CHANGE UP
RBC CASTS # UR COMP ASSIST: >720 /HPF — HIGH (ref 0–4)
SODIUM SERPL-SCNC: 131 MMOL/L — LOW (ref 135–146)
SP GR SPEC: 1.02 — SIGNIFICANT CHANGE UP (ref 1.01–1.02)
UROBILINOGEN FLD QL: SIGNIFICANT CHANGE UP
VARIANT LYMPHS # BLD: 3.5 % — SIGNIFICANT CHANGE UP (ref 0–5)
WBC # BLD: 15.26 K/UL — HIGH (ref 4.8–10.8)
WBC # FLD AUTO: 15.26 K/UL — HIGH (ref 4.8–10.8)
WBC UR QL: 2 /HPF — SIGNIFICANT CHANGE UP (ref 0–5)

## 2019-09-26 PROCEDURE — 99284 EMERGENCY DEPT VISIT MOD MDM: CPT

## 2019-09-26 RX ORDER — SODIUM CHLORIDE 9 MG/ML
1000 INJECTION INTRAMUSCULAR; INTRAVENOUS; SUBCUTANEOUS ONCE
Refills: 0 | Status: COMPLETED | OUTPATIENT
Start: 2019-09-26 | End: 2019-09-26

## 2019-09-26 RX ADMIN — SODIUM CHLORIDE 1000 MILLILITER(S): 9 INJECTION INTRAMUSCULAR; INTRAVENOUS; SUBCUTANEOUS at 21:40

## 2019-09-26 NOTE — ED PROVIDER NOTE - NSFOLLOWUPINSTRUCTIONS_ED_ALL_ED_FT
Please follow-up with the urologist as soon as possible.    Inserción de catéter urinario permanente, cuidados posteriores  Indwelling Urinary Catheter Insertion, Care After  Introducción  Paty esta información sobre cómo cuidarse después del procedimiento. Potter médico también podrá darle indicaciones más específicas. Comuníquese con potter médico si tiene problemas o preguntas.    ¿Qué puedo esperar después del procedimiento?  Después del procedimiento, es común tener los siguientes síntomas:  Malestar leve alrededor de la uretra en el lugar en que el catéter ingresa en potter cuerpo.  Siga estas indicaciones en potter casa:  Image   Mantenga la bolsa de drenaje al mismo nivel o por debajo del nivel de la vejiga. Al hacer esto se asegura de que la orina solo pueda drenar hacia afuera y no hacia el interior del cuerpo.  Asegure los tubos del catéter y la bolsa de drenaje en la pierna o muslo para impedir que se mueva.  Controle los tubos del catéter con frecuencia para asegurarse de que no haya torceduras o estén bloqueados.  Pilgrim duchas a diario para mantener limpio al catéter. No tome bethel de inmersión.  No tire del catéter ni trate de quitarlo.  Desconecte los tubos y la bolsa de drenaje solo cuando sea imprescindible.  Vacíe la bolsa de drenaje cada 2 a 4 horas o con más frecuencia, si es necesario. No permita que la bolsa se llene totalmente.  Lávese las alessandro con agua y jabón, antes y después de tocar el catéter, los tubos o la bolsa de drenaje.  No permita que la bolsa de drenaje ni el catéter toquen el suelo.  Sallie suficiente líquido para mantener la orina sumanth o de color amarillo pálido, o según las indicaciones del médico.  Comuníquese con un médico si:  La orina sailna de fluir hacia la bolsa de drenaje.  Siente dolor u opresión en la shira de la vejiga.  Siente dolor en la espalda.  La sonda se obstruye.  La sonda empieza a tener filtraciones.  La orina parece turbia.  La bolsa o los tubos de drenaje parecen sucios.  Percibe que sale mal olor cuando vacía la bolsa de drenaje.  Solicite ayuda de inmediato si:  Tiene fiebre o siente escalofríos.  Siente dolor intenso en la espalda o en la parte inferior del abdomen.  Siente calor o dolor, o presenta enrojecimiento o hinchazón en la shira de la uretra.  Observa erum en la orina.  La sonda se martínez salido del lugar.  Resumen  No tire del catéter ni trate de quitarlo.  Mantenga la bolsa de drenaje al mismo nivel o por debajo del nivel de la vejiga, anita no permita que la bolsa de drenaje o el catéter toquen el suelo.  Lávese las alessandro con agua y jabón, antes y después de tocar el catéter, los tubos o la bolsa de drenaje.  Comuníquese con potter médico si tiene fiebre, escalofríos u observa algún signo de infección.    Retención urinaria aguda en los hombres  Acute Urinary Retention, Male  Introducción  Image   La retención urinaria aguda sucede cuando no puede hacer pis (orinar), o cuando orina muy poco y potter vejiga no está del todo vacía. Si no se trata, puede desencadenar en daño renal u otros problemas graves.    Siga estas indicaciones en potter casa:  Pilgrim los medicamentos de venta akil y los recetados solamente lauren se lo haya indicado el médico. Pregúntele a potter médico qué medicamentos no debe ingerir. No use ningún medicamento excepto que el médico lo apruebe.  Si volvió a potter hogar con un tubo que drena la vejiga (catéter), cuídelo lauren se lo haya indicado potter médico.  Sallie suficiente líquido para mantener el pis candido o de color amarillo pálido.  Si le recetaron un antibiótico, tómelo o aplíquelo lauren se lo haya indicado el médico. No deje de flor los antibióticos aunque comience a sentirse mejor.  No consuma ningún producto que contenga nicotina o tabaco, lauren cigarrillos y cigarrillos electrónicos. Si necesita ayuda para dejar de fumar, consulte al médico.  Controle los cambios en ras síntomas. Dígale a potter médico sobre ellos.  Rocío un seguimiento de los cambios en la presión arterial en potter hogar, si así se le indicó. Dígale a potter médico sobre ellos.  Concurra a todas las visitas de control lauren se lo haya indicado el médico. [place] es importante.  Comuníquese con un médico si:  Tiene espasmos o pierde orina cuando tiene espasmos.  Solicite ayuda de inmediato si:  Tiene escalofríos o fiebre.  Tiene un tubo que drena la vejiga, y:  El tubo no drena la orina.  El tubo se sale.  Observa erum en la orina.  Resumen  La retención urinaria aguda sucede cuando no puede orinar, o cuando orina muy poco y potter vejiga no está del todo vacía. Si no se trata, puede resultar en daño renal u otros problemas graves.  Si volvió a potter hogar con un tubo que drena la vejiga, cuídelo lauren se lo haya indicado potter médico.  Controle si hay cambios en los síntomas. Informe al médico si hay algún cambio.

## 2019-09-26 NOTE — ED PROVIDER NOTE - OBJECTIVE STATEMENT
57yo M with PMH of HTN, DM, BPH, and prostatitis presenting to ED with urinary retention x 1 day. Patient 57yo M with PMH of HTN, DM, BPH, and prostatitis presenting to ED with urinary retention x 1 day. Patient was recently discharged from hospital 2 days ago, had bagley in place that was removed at that time. Patient is taking both Bactrim and Cipro 2/2 ESBL blood culture with sensitivity that showed pan-resistant E. Coli. sensitive to meropenem, treated for prostatitis. ID Dr. Daley recommended patient take Bactrim for 1 month. Patient has had difficulty initiating urine stream and has had only dribbling/a couple of drops of urine today. Denies f/c, CP, SOB, abd pain, n/v/d, back pain, hematuria/dysuria, weakness, injuries/traumas/falls, or testicular pain.

## 2019-09-26 NOTE — ED PROVIDER NOTE - ATTENDING CONTRIBUTION TO CARE
58 y.o. M, PMH of HTN, DM, BPH, prostatitis, was discharged from the hospital 2 days ago, had Bagley removed yesterday, comes in for inability to urinate today. Patient is on Bactrim and Cipro. Patient has had difficulty initiating urine stream and has had only dribbling/a couple of drops of urine today. Denies f/c, CP, SOB, abd pain, n/v/d, back pain, hematuria/dysuria, weakness, injuries/traumas/falls, or testicular pain. On exam, pt in NAD, AAOx3, head NC/AT, CN II-XII intact, lungs CTA B/L, CV S1S2 regular, abdomen soft/(+) suprapubic discomfort/ND/(+)BS, ext (-) edema. Will do labs, UA, place bagley and reevaluate.

## 2019-09-26 NOTE — ED ADULT NURSE NOTE - MUSCULOSKELETAL WDL
Full range of motion of upper and lower extremities, no joint tenderness/swelling.
Adequate: hears normal conversation without difficulty

## 2019-09-26 NOTE — ED PROVIDER NOTE - PATIENT PORTAL LINK FT
You can access the FollowMyHealth Patient Portal offered by [facility] by registering at the following website: [email or web address]. By joining [employer]’s FollowMyHealth portal, you will also be able to view your health information using other applications (apps) compatible with our system.

## 2019-09-26 NOTE — ED PROVIDER NOTE - CARE PROVIDER_API CALL
Fiordaliza Rae)  Urology  [address] [place] [address], [address]  [place] [address]  Phone: [phone]  Fax: [phone]  Follow Up Time: 1-3 Days

## 2019-09-26 NOTE — ED ADULT TRIAGE NOTE - CHIEF COMPLAINT QUOTE
patient had Blackwell catheter removed yesterday, patient reports decreased urine output and difficulty starting

## 2019-09-26 NOTE — ED PROVIDER NOTE - CARE PLAN
Principal Discharge DX:	Urinary retention  Secondary Diagnosis:	History of BPH  Secondary Diagnosis:	History of prostatitis

## 2019-09-26 NOTE — ED PROVIDER NOTE - PROGRESS NOTE DETAILS
Discussed with urology. Agree with plan. State will see patient if a complication is found, otherwise patient can have bagley placed and be discharged home with outpatient f/u. Feeling much better after bagley placement. Advised uro f/u outpatient. Patient is taking Bactrim and Cipro. Full DC instructions and precaution signs and symptoms discussed. Proper follow up ensured.  Medications administered and prescribed/OTC home meds discussed.  All questions and concerns from patient addressed.  Understanding of instructions verbalized. Patient endorsed pain after bagley placement. Given morphine x 2 2/2 increased discomfort. States morphine helps his pain. Advised him to c/w his abx and to take his prescribed medicine.

## 2019-09-26 NOTE — ED PROVIDER NOTE - NS ED ROS FT
Constitutional:  No fevers or chills.  Eyes:  No visual changes, eye pain, or discharge.  ENT:  No hearing changes. No sore throat.  Neck:  No neck pain or stiffness.  Cardiac:  No CP or edema.  Resp:  No cough or SOB.   GI:  No nausea, vomiting, diarrhea, or abdominal pain.  :  No dysuria, frequency, or hematuria. +Urinary retention.  MSK:  No myalgias or joint pain/swelling.  Neuro:  No headache, dizziness, or weakness.  Skin:  No skin rash.

## 2019-09-26 NOTE — ED PROVIDER NOTE - CLINICAL SUMMARY MEDICAL DECISION MAKING FREE TEXT BOX
58 y.o. M, PMH of HTN, DM, BPH, prostatitis, was discharged from the hospital 2 days ago, had Blackwell removed yesterday, comes in for inability to urinate today. Patient is on Bactrim and Cipro. Patient has had difficulty initiating urine stream and has had only dribbling/a couple of drops of urine today. Denies f/c, CP, SOB, abd pain, n/v/d, back pain, hematuria/dysuria, weakness, injuries/traumas/falls, or testicular pain. On exam, pt in NAD, AAOx3, head NC/AT, CN II-XII intact, lungs CTA B/L, CV S1S2 regular, abdomen soft/(+) suprapubic discomfort/ND/(+)BS, ext (-) edema. Labs done and reviewed. Blackwell placed. Will discharge.

## 2019-09-27 VITALS
RESPIRATION RATE: 18 BRPM | DIASTOLIC BLOOD PRESSURE: 84 MMHG | TEMPERATURE: 97 F | HEART RATE: 82 BPM | SYSTOLIC BLOOD PRESSURE: 180 MMHG | OXYGEN SATURATION: 100 %

## 2019-09-27 PROBLEM — N40.1 BENIGN PROSTATIC HYPERPLASIA WITH LOWER URINARY TRACT SYMPTOMS: Chronic | Status: ACTIVE | Noted: 2019-09-22

## 2019-09-27 PROCEDURE — 99282 EMERGENCY DEPT VISIT SF MDM: CPT

## 2019-09-27 RX ORDER — MORPHINE SULFATE 50 MG/1
4 CAPSULE, EXTENDED RELEASE ORAL ONCE
Refills: 0 | Status: DISCONTINUED | OUTPATIENT
Start: 2019-09-27 | End: 2019-09-27

## 2019-09-27 RX ADMIN — MORPHINE SULFATE 4 MILLIGRAM(S): 50 CAPSULE, EXTENDED RELEASE ORAL at 00:11

## 2019-09-27 RX ADMIN — MORPHINE SULFATE 4 MILLIGRAM(S): 50 CAPSULE, EXTENDED RELEASE ORAL at 01:23

## 2019-09-27 NOTE — ED PROVIDER NOTE - PHYSICAL EXAMINATION
PHYSICAL EXAM: I have reviewed current vital signs.  GENERAL: NAD, well-nourished; well-developed.  HEAD:  Normocephalic, atraumatic.  EYES: Conjunctiva and sclera clear.  ENT: MMM, no erythema/exudates.  NECK: Supple, full ROM.  CHEST/LUNG: Clear to auscultation bilaterally; no wheezes, rales, or rhonchi.  HEART: Regular rate and rhythm, normal S1 and S2; no murmurs, rubs, or gallops.  ABDOMEN: Soft, nontender, +suprapubic distention.  : Chaperone present- Dr. Vee. Blackwell in place, leg bag with no urine. Scrotum/penis WNL.  EXTREMITIES:  2+ peripheral pulses; FROM.  PSYCH: Cooperative, appropriate, normal mood and affect.  NEUROLOGY: A&O x 3. Motor 5/5. No focal neurological deficits.   SKIN: Warm and dry.

## 2019-09-27 NOTE — ED PROVIDER NOTE - ATTENDING CONTRIBUTION TO CARE
pt with bagley/leg bag shima vazquez from [facility] for urinary retention sts not draining and his bladder is starting to get distended. no ab pain, n, v, fevr, chills, flank pain.  pt in nad, ab soft, nt, +suprapubic distension. no cvat. penis and scrotum nml. catheter in place. no urine in bag.     will flush bagley.

## 2019-09-27 NOTE — ED PROVIDER NOTE - OBJECTIVE STATEMENT
59yo M with PMH of HTN, DM, BPH, and prostatitis presenting to ED with bagley catheter drainage problem. Was just seen here in ED hours ago and had 14Fr bagley placed 2/2 urinary retention. Returned to ED 2/2 no drainage. Patient is taking both Bactrim and Cipro 2/2 ESBL blood culture with sensitivity that showed pan-resistant E. Coli. sensitive to meropenem, treated for prostatitis. ID Dr. Daley recommended patient take Bactrim for 1 month. Denies f/c, CP, SOB, abd pain, n/v/d, back pain, hematuria/dysuria, weakness, injuries/traumas/falls, or testicular pain.

## 2019-09-27 NOTE — ED PROVIDER NOTE - PROGRESS NOTE DETAILS
14 Maltese bagley removed and exchanged for 16 Maltese. Patient with immediate relief and drainage of urine after. Will f/u with urology.

## 2019-09-27 NOTE — ED PROVIDER NOTE - NSFOLLOWUPINSTRUCTIONS_ED_ALL_ED_FT
Please follow-up with your urologist as soon as possible.    Blackwell Catheter Placement and Care    WHAT YOU NEED TO KNOW:    What is a Blackwell catheter? A Blackwell catheter is a sterile tube that is inserted into your bladder to drain urine. It is also called an indwelling urinary catheter. The tip of the catheter has a small balloon filled with solution that holds the catheter in your bladder.        How is a Blackwell catheter placed?     Your healthcare provider will clean your genital area with a sterile solution. He or she will put lubricant jelly on the catheter to help it go in smoothly. The end of the catheter with the deflated balloon will be inserted into your urethra. The catheter will be moved slowly and gently into your bladder. You may be asked to take slow, deep breaths or to push as if you were trying to urinate as the catheter is inserted.      When your healthcare provider sees urine flowing from the catheter, he or she will fill the balloon at the end of the catheter. The balloon holds the catheter in place so it does not come out. Your healthcare provider will attach the open end of the catheter to a sterile drainage bag.    How do I care for my Blackwell catheter?     Clean your genital area 2 times every day. Clean your catheter and the area around where it was inserted. Use soap and water. Clean your anal opening and catheter area after every bowel movement.       Secure the catheter tube so you do not pull or move the catheter. This helps prevent pain and bladder spasms. Healthcare providers will show you how to use medical tape or a strap to secure the catheter tube to your body.       Keep a closed drainage system. Your Blackwell catheter should always be attached to the drainage bag to form a closed system. Do not disconnect any part of the closed system unless you need to change the bag.    How do I care for my drainage bag?     Ask if a leg bag is right for you. A leg bag can be worn under your clothes. Ask your healthcare provider for more information about a leg bag.       Keep the drainage bag below the level of your waist. This helps stop urine from moving back up the tubing and into your bladder. Do not loop or kink the tubing. This can cause urine to back up and collect in your bladder. Do not let the drainage bag touch or lie on the floor.      Empty the drainage bag when needed. The weight of a full drainage bag can be painful. Empty the drainage bag every 3 to 6 hours or when it is ? full.       Clean and change the drainage bag as directed. Ask your healthcare provider how often you should change the drainage bag and what cleaning solution to use. Wear disposable gloves when you change the bag. Do not allow the end of the catheter or tubing to touch anything. Clean the ends with an alcohol pad before you reconnect them.    What can I do if problems develop?    No urine is draining into the bag:   Check for kinks in the tubing and straighten them out.       Check the tape or strap used to secure the catheter tube to your skin. Make sure it is not blocking the tube.       Make sure you are not sitting or lying on the tubing.      Make sure the urine bag is hanging below the level of your waist.      Urine leaks from or around the catheter, tubing, or drainage bag: Check if the closed drainage system has accidently come open or apart. Clean the catheter and tubing ends with a new alcohol pad and reconnect them.     What are the risks of a Blackwell catheter? You may develop an infection caused by bacteria. This can happen when the drainage system is opened and bacteria get inside the tubing. You can also get an infection if the catheter equipment is not cleaned well or you do not wash your hands. The infection can spread to your bladder or other organs, and may become severe.    How can I help prevent an infection?     Wash your hands often. Wash before and after you touch your catheter, tubing, or drainage bag. Use soap and water. Wear clean disposable gloves when you care for your catheter or disconnect the drainage bag. Wash your hands before you prepare or eat food. Handwashing           Drink liquids as directed. Ask your healthcare provider how much liquid to drink each day and which liquids are best for you. Liquids will help flush your kidneys and bladder to help prevent infection.    When should I seek immediate care?     Your catheter comes out.       You suddenly have material that looks like sand in the tubing or drainage bag.      No urine is draining into the bag and you have checked the system.      You have pain in your hip, back, pelvis, or lower abdomen.      You are confused or cannot think clearly.    When should I call my doctor?     You have a fever.      You have bladder spasms for more than 1 day after the catheter is placed.      You see blood in the tubing or drainage bag.      You have a rash or itching where the catheter tube is secured to your skin.      Urine leaks from or around the catheter, tubing, or drainage bag.      The closed drainage system has accidently come open or apart.       You see a layer of crystals inside the tubing.      You have questions or concerns about your condition or care. Please follow-up with your urologist as soon as possible.    Blackwell Catheter Placement and Care  WHAT YOU NEED TO KNOW:  What is a Blackwell catheter? A Blackwell catheter is a sterile tube that is inserted into your bladder to drain urine. It is also called an indwelling urinary catheter. The tip of the catheter has a small balloon filled with solution that holds the catheter in your bladder.      How is a Blackwell catheter placed?     Your healthcare provider will clean your genital area with a sterile solution. He or she will put lubricant jelly on the catheter to help it go in smoothly. The end of the catheter with the deflated balloon will be inserted into your urethra. The catheter will be moved slowly and gently into your bladder. You may be asked to take slow, deep breaths or to push as if you were trying to urinate as the catheter is inserted.      When your healthcare provider sees urine flowing from the catheter, he or she will fill the balloon at the end of the catheter. The balloon holds the catheter in place so it does not come out. Your healthcare provider will attach the open end of the catheter to a sterile drainage bag.    How do I care for my Blackwell catheter?     Clean your genital area 2 times every day. Clean your catheter and the area around where it was inserted. Use soap and water. Clean your anal opening and catheter area after every bowel movement.       Secure the catheter tube so you do not pull or move the catheter. This helps prevent pain and bladder spasms. Healthcare providers will show you how to use medical tape or a strap to secure the catheter tube to your body.       Keep a closed drainage system. Your Blackwell catheter should always be attached to the drainage bag to form a closed system. Do not disconnect any part of the closed system unless you need to change the bag.    How do I care for my drainage bag?     Ask if a leg bag is right for you. A leg bag can be worn under your clothes. Ask your healthcare provider for more information about a leg bag.       Keep the drainage bag below the level of your waist. This helps stop urine from moving back up the tubing and into your bladder. Do not loop or kink the tubing. This can cause urine to back up and collect in your bladder. Do not let the drainage bag touch or lie on the floor.      Empty the drainage bag when needed. The weight of a full drainage bag can be painful. Empty the drainage bag every 3 to 6 hours or when it is ? full.       Clean and change the drainage bag as directed. Ask your healthcare provider how often you should change the drainage bag and what cleaning solution to use. Wear disposable gloves when you change the bag. Do not allow the end of the catheter or tubing to touch anything. Clean the ends with an alcohol pad before you reconnect them.    What can I do if problems develop?    No urine is draining into the bag:   Check for kinks in the tubing and straighten them out.       Check the tape or strap used to secure the catheter tube to your skin. Make sure it is not blocking the tube.       Make sure you are not sitting or lying on the tubing.      Make sure the urine bag is hanging below the level of your waist.      Urine leaks from or around the catheter, tubing, or drainage bag: Check if the closed drainage system has accidently come open or apart. Clean the catheter and tubing ends with a new alcohol pad and reconnect them.     What are the risks of a Blackwell catheter? You may develop an infection caused by bacteria. This can happen when the drainage system is opened and bacteria get inside the tubing. You can also get an infection if the catheter equipment is not cleaned well or you do not wash your hands. The infection can spread to your bladder or other organs, and may become severe.    How can I help prevent an infection?     Wash your hands often. Wash before and after you touch your catheter, tubing, or drainage bag. Use soap and water. Wear clean disposable gloves when you care for your catheter or disconnect the drainage bag. Wash your hands before you prepare or eat food. Handwashing           Drink liquids as directed. Ask your healthcare provider how much liquid to drink each day and which liquids are best for you. Liquids will help flush your kidneys and bladder to help prevent infection.    When should I seek immediate care?     Your catheter comes out.       You suddenly have material that looks like sand in the tubing or drainage bag.      No urine is draining into the bag and you have checked the system.      You have pain in your hip, back, pelvis, or lower abdomen.      You are confused or cannot think clearly.    When should I call my doctor?     You have a fever.      You have bladder spasms for more than 1 day after the catheter is placed.      You see blood in the tubing or drainage bag.      You have a rash or itching where the catheter tube is secured to your skin.      Urine leaks from or around the catheter, tubing, or drainage bag.      The closed drainage system has accidently come open or apart.       You see a layer of crystals inside the tubing.      You have questions or concerns about your condition or care.

## 2019-09-27 NOTE — ED PROVIDER NOTE - PATIENT PORTAL LINK FT
You can access the FollowMyHealth Patient Portal offered by [facility] by registering at the following website: [email or web address]. By joining [facility]’s FollowMyHealth portal, you will also be able to view your health information using other applications (apps) compatible with our system.

## 2019-09-28 LAB
CULTURE RESULTS: NO GROWTH — SIGNIFICANT CHANGE UP
CULTURE RESULTS: SIGNIFICANT CHANGE UP
CULTURE RESULTS: SIGNIFICANT CHANGE UP
SPECIMEN SOURCE: SIGNIFICANT CHANGE UP

## 2019-10-03 ENCOUNTER — APPOINTMENT (OUTPATIENT)
Dept: UROLOGY | Facility: CLINIC | Age: 59
End: 2019-10-03
Payer: MEDICAID

## 2019-10-03 VITALS
SYSTOLIC BLOOD PRESSURE: 148 MMHG | HEIGHT: 68 IN | HEART RATE: 86 BPM | BODY MASS INDEX: 30.31 KG/M2 | DIASTOLIC BLOOD PRESSURE: 84 MMHG | WEIGHT: 200 LBS

## 2019-10-03 PROCEDURE — 99214 OFFICE O/P EST MOD 30 MIN: CPT

## 2019-10-03 RX ORDER — TAMSULOSIN HYDROCHLORIDE 0.4 MG/1
0.4 CAPSULE ORAL
Qty: 30 | Refills: 3 | Status: ACTIVE | COMMUNITY
Start: 2017-06-20 | End: 1900-01-01

## 2019-10-03 NOTE — ASSESSMENT
[FreeTextEntry1] : Chano is a 58-year-old male, with a history of uncontrolled type 2 diabetes, presented w balanitis and dysuria July now w urinary retention.\par PSA elevated for age.\par \par Pt not on flomax. Recommending flomax. fu 1 week for TOV.\par Once urinary retention resolved can repeat PSA. \par Highly consider turp or bladder outlet procedure for pt in future as this is second episode of retention and he has bothersome luts\par

## 2019-10-03 NOTE — HISTORY OF PRESENT ILLNESS
[FreeTextEntry1] : Chano is a 58-year-old male, with a history of uncontrolled type 2 diabetes, presented w balanitis and dysuria July now w urinary retention.\par \par She reports that 2 weeks ago he developed worsening lower urinary tract symptoms and dysuria. He was in the emergency room and was found to have "prostatitis" and was actually admitted. Required urinary catheter and failed multiple trial voids. UCx at time was >100,000 cfu and pt is finishing abx course.Patient does not report that he is on Flomax at this time.\par \par UA neg for blood 7/2019.\par PSA 3.95\par RBUS w PVR prostate 63g PVR 48cc\par \par At baseline he is complaining of some feelings of incomplete bladder emptying, urinary frequency, intermittency, and 3-4 episodes of nocturia.\par His IPSS is 21 indicating severe symptoms. Denies pelvic pain or hematuria.\par \par He denies any erectile dysfunction at this time.

## 2019-10-03 NOTE — PHYSICAL EXAM
[General Appearance - Well Developed] : well developed [General Appearance - Well Nourished] : well nourished [Normal Appearance] : normal appearance [Well Groomed] : well groomed [Abdomen Soft] : soft [General Appearance - In No Acute Distress] : no acute distress [Abdomen Tenderness] : non-tender [Costovertebral Angle Tenderness] : no ~M costovertebral angle tenderness [Penis Abnormality] : normal uncircumcised penis [Urethral Meatus] : meatus normal [Urinary Bladder Findings] : the bladder was normal on palpation [Scrotum] : the scrotum was normal [Testes Tenderness] : no tenderness of the testes [Testes Mass (___cm)] : there were no testicular masses [Rectal Exam - Rectum] : digital rectal exam was normal [Anus Abnormality] : the anus and perineum were normal [FreeTextEntry1] : no e/o balanitis, last visit GAUDENCIO normal [Edema] : no peripheral edema [Respiration, Rhythm And Depth] : normal respiratory rhythm and effort [] : no respiratory distress [Oriented To Time, Place, And Person] : oriented to person, place, and time [Exaggerated Use Of Accessory Muscles For Inspiration] : no accessory muscle use [Affect] : the affect was normal [Mood] : the mood was normal [Not Anxious] : not anxious [Normal Station and Gait] : the gait and station were normal for the patient's age [Femoral Lymph Nodes Enlarged Bilaterally] : femoral [No Focal Deficits] : no focal deficits [Inguinal Lymph Nodes Enlarged Bilaterally] : inguinal
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
## 2019-10-05 DIAGNOSIS — T83.091A OTHER MECHANICAL COMPLICATION OF INDWELLING URETHRAL CATHETER, INITIAL ENCOUNTER: ICD-10-CM

## 2019-10-05 DIAGNOSIS — R33.9 RETENTION OF URINE, UNSPECIFIED: ICD-10-CM
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
## 2019-10-08 ENCOUNTER — APPOINTMENT (OUTPATIENT)
Dept: UROLOGY | Facility: CLINIC | Age: 59
End: 2019-10-08
Payer: MEDICAID

## 2019-10-08 DIAGNOSIS — R39.15 URGENCY OF URINATION: ICD-10-CM

## 2019-10-08 DIAGNOSIS — R97.20 ELEVATED PROSTATE, SPECIFIC ANTIGEN [PSA]: ICD-10-CM

## 2019-10-08 DIAGNOSIS — R33.9 RETENTION OF URINE, UNSPECIFIED: ICD-10-CM

## 2019-10-08 DIAGNOSIS — R35.1 NOCTURIA: ICD-10-CM

## 2019-10-08 PROCEDURE — 99214 OFFICE O/P EST MOD 30 MIN: CPT

## 2019-10-08 NOTE — ASSESSMENT
[FreeTextEntry1] : Chano is a 58-year-old male, with a history of uncontrolled type 2 diabetes, presented w balanitis and dysuria July now w urinary retention.\par PSA elevated for age.\par \par TOV today bagley dced.\par Flomax\par PSA 4 weeks\par Highly consider turp or bladder outlet procedure for pt in future as this is second episode of retention and he has bothersome luts\par

## 2019-10-08 NOTE — PHYSICAL EXAM
[General Appearance - Well Developed] : well developed [General Appearance - Well Nourished] : well nourished [Normal Appearance] : normal appearance [Well Groomed] : well groomed [General Appearance - In No Acute Distress] : no acute distress [Abdomen Soft] : soft [Abdomen Tenderness] : non-tender [Costovertebral Angle Tenderness] : no ~M costovertebral angle tenderness [Urethral Meatus] : meatus normal [Penis Abnormality] : normal uncircumcised penis [Urinary Bladder Findings] : the bladder was normal on palpation [Scrotum] : the scrotum was normal [Anus Abnormality] : the anus and perineum were normal [Testes Mass (___cm)] : there were no testicular masses [Testes Tenderness] : no tenderness of the testes [FreeTextEntry1] : bagley clear [Rectal Exam - Rectum] : digital rectal exam was normal [Edema] : no peripheral edema [] : no respiratory distress [Respiration, Rhythm And Depth] : normal respiratory rhythm and effort [Exaggerated Use Of Accessory Muscles For Inspiration] : no accessory muscle use [Affect] : the affect was normal [Oriented To Time, Place, And Person] : oriented to person, place, and time [Mood] : the mood was normal [Not Anxious] : not anxious [No Focal Deficits] : no focal deficits [Normal Station and Gait] : the gait and station were normal for the patient's age [Inguinal Lymph Nodes Enlarged Bilaterally] : inguinal [Femoral Lymph Nodes Enlarged Bilaterally] : femoral

## 2019-10-08 NOTE — HISTORY OF PRESENT ILLNESS
[FreeTextEntry1] : Chano is a 58-year-old male, with a history of uncontrolled type 2 diabetes, presented w balanitis and dysuria July now w urinary retention.\par \par Started on flomax. no issues. 1 weeks. \par Reports that 3 weeks ago he developed worsening lower urinary tract symptoms and dysuria. He was in the emergency room and was found to have "prostatitis" and was actually admitted. Required urinary catheter and failed multiple trial voids. UCx at time was >100,000 cfu and pt is finishing abx course.Patient does not report that he is on Flomax at this time.\par \par UA neg for blood 7/2019.\par PSA 3.95\par RBUS w PVR prostate 63g PVR 48cc\par \par At baseline he is complaining of some feelings of incomplete bladder emptying, urinary frequency, intermittency, and 3-4 episodes of nocturia.\par His IPSS is 21 indicating severe symptoms. Denies pelvic pain or hematuria.\par \par He denies any erectile dysfunction at this time.

## 2019-10-21 ENCOUNTER — APPOINTMENT (OUTPATIENT)
Dept: UROLOGY | Facility: CLINIC | Age: 59
End: 2019-10-21

## 2019-10-31 ENCOUNTER — INPATIENT (INPATIENT)
Facility: HOSPITAL | Age: 59
LOS: 6 days | Discharge: HOME | End: 2019-11-07
Attending: INTERNAL MEDICINE | Admitting: INTERNAL MEDICINE
Payer: MEDICAID

## 2019-10-31 VITALS
TEMPERATURE: 98 F | SYSTOLIC BLOOD PRESSURE: 157 MMHG | RESPIRATION RATE: 20 BRPM | HEART RATE: 87 BPM | DIASTOLIC BLOOD PRESSURE: 68 MMHG | OXYGEN SATURATION: 97 %

## 2019-10-31 LAB
ANION GAP SERPL CALC-SCNC: 14 MMOL/L — SIGNIFICANT CHANGE UP (ref 7–14)
APPEARANCE UR: CLEAR — SIGNIFICANT CHANGE UP
BACTERIA # UR AUTO: ABNORMAL
BASOPHILS # BLD AUTO: 0.03 K/UL — SIGNIFICANT CHANGE UP (ref 0–0.2)
BASOPHILS NFR BLD AUTO: 0.4 % — SIGNIFICANT CHANGE UP (ref 0–1)
BILIRUB UR-MCNC: NEGATIVE — SIGNIFICANT CHANGE UP
BUN SERPL-MCNC: 16 MG/DL — SIGNIFICANT CHANGE UP (ref 10–20)
CALCIUM SERPL-MCNC: 9.7 MG/DL — SIGNIFICANT CHANGE UP (ref 8.5–10.1)
CHLORIDE SERPL-SCNC: 98 MMOL/L — SIGNIFICANT CHANGE UP (ref 98–110)
CO2 SERPL-SCNC: 23 MMOL/L — SIGNIFICANT CHANGE UP (ref 17–32)
COLOR SPEC: SIGNIFICANT CHANGE UP
CREAT SERPL-MCNC: 1.1 MG/DL — SIGNIFICANT CHANGE UP (ref 0.7–1.5)
DIFF PNL FLD: SIGNIFICANT CHANGE UP
EOSINOPHIL # BLD AUTO: 0.11 K/UL — SIGNIFICANT CHANGE UP (ref 0–0.7)
EOSINOPHIL NFR BLD AUTO: 1.5 % — SIGNIFICANT CHANGE UP (ref 0–8)
EPI CELLS # UR: 0 /HPF — SIGNIFICANT CHANGE UP (ref 0–5)
GLUCOSE BLDC GLUCOMTR-MCNC: 182 MG/DL — HIGH (ref 70–99)
GLUCOSE SERPL-MCNC: 313 MG/DL — HIGH (ref 70–99)
GLUCOSE UR QL: ABNORMAL
HCT VFR BLD CALC: 36.2 % — LOW (ref 42–52)
HGB BLD-MCNC: 12.3 G/DL — LOW (ref 14–18)
HYALINE CASTS # UR AUTO: 2 /LPF — SIGNIFICANT CHANGE UP (ref 0–7)
IMM GRANULOCYTES NFR BLD AUTO: 0.3 % — SIGNIFICANT CHANGE UP (ref 0.1–0.3)
KETONES UR-MCNC: NEGATIVE — SIGNIFICANT CHANGE UP
LEUKOCYTE ESTERASE UR-ACNC: ABNORMAL
LYMPHOCYTES # BLD AUTO: 1.87 K/UL — SIGNIFICANT CHANGE UP (ref 1.2–3.4)
LYMPHOCYTES # BLD AUTO: 26.1 % — SIGNIFICANT CHANGE UP (ref 20.5–51.1)
MCHC RBC-ENTMCNC: 30.9 PG — SIGNIFICANT CHANGE UP (ref 27–31)
MCHC RBC-ENTMCNC: 34 G/DL — SIGNIFICANT CHANGE UP (ref 32–37)
MCV RBC AUTO: 91 FL — SIGNIFICANT CHANGE UP (ref 80–94)
MONOCYTES # BLD AUTO: 0.54 K/UL — SIGNIFICANT CHANGE UP (ref 0.1–0.6)
MONOCYTES NFR BLD AUTO: 7.5 % — SIGNIFICANT CHANGE UP (ref 1.7–9.3)
NEUTROPHILS # BLD AUTO: 4.6 K/UL — SIGNIFICANT CHANGE UP (ref 1.4–6.5)
NEUTROPHILS NFR BLD AUTO: 64.2 % — SIGNIFICANT CHANGE UP (ref 42.2–75.2)
NITRITE UR-MCNC: NEGATIVE — SIGNIFICANT CHANGE UP
NRBC # BLD: 0 /100 WBCS — SIGNIFICANT CHANGE UP (ref 0–0)
PH UR: 5.5 — SIGNIFICANT CHANGE UP (ref 5–8)
PLATELET # BLD AUTO: 236 K/UL — SIGNIFICANT CHANGE UP (ref 130–400)
POTASSIUM SERPL-MCNC: 4.4 MMOL/L — SIGNIFICANT CHANGE UP (ref 3.5–5)
POTASSIUM SERPL-SCNC: 4.4 MMOL/L — SIGNIFICANT CHANGE UP (ref 3.5–5)
PROT UR-MCNC: SIGNIFICANT CHANGE UP
RBC # BLD: 3.98 M/UL — LOW (ref 4.7–6.1)
RBC # FLD: 13.2 % — SIGNIFICANT CHANGE UP (ref 11.5–14.5)
RBC CASTS # UR COMP ASSIST: 2 /HPF — SIGNIFICANT CHANGE UP (ref 0–4)
SODIUM SERPL-SCNC: 135 MMOL/L — SIGNIFICANT CHANGE UP (ref 135–146)
SP GR SPEC: 1.02 — SIGNIFICANT CHANGE UP (ref 1.01–1.02)
UROBILINOGEN FLD QL: SIGNIFICANT CHANGE UP
WBC # BLD: 7.17 K/UL — SIGNIFICANT CHANGE UP (ref 4.8–10.8)
WBC # FLD AUTO: 7.17 K/UL — SIGNIFICANT CHANGE UP (ref 4.8–10.8)
WBC UR QL: 100 /HPF — HIGH (ref 0–5)

## 2019-10-31 PROCEDURE — 99223 1ST HOSP IP/OBS HIGH 75: CPT | Mod: AI

## 2019-10-31 PROCEDURE — 99285 EMERGENCY DEPT VISIT HI MDM: CPT

## 2019-10-31 PROCEDURE — 76770 US EXAM ABDO BACK WALL COMP: CPT | Mod: 26

## 2019-10-31 RX ORDER — GLUCAGON INJECTION, SOLUTION 0.5 MG/.1ML
1 INJECTION, SOLUTION SUBCUTANEOUS ONCE
Refills: 0 | Status: DISCONTINUED | OUTPATIENT
Start: 2019-10-31 | End: 2019-11-07

## 2019-10-31 RX ORDER — ENOXAPARIN SODIUM 100 MG/ML
40 INJECTION SUBCUTANEOUS DAILY
Refills: 0 | Status: DISCONTINUED | OUTPATIENT
Start: 2019-10-31 | End: 2019-11-07

## 2019-10-31 RX ORDER — DEXTROSE 50 % IN WATER 50 %
15 SYRINGE (ML) INTRAVENOUS ONCE
Refills: 0 | Status: DISCONTINUED | OUTPATIENT
Start: 2019-10-31 | End: 2019-11-07

## 2019-10-31 RX ORDER — INSULIN GLARGINE 100 [IU]/ML
30 INJECTION, SOLUTION SUBCUTANEOUS AT BEDTIME
Refills: 0 | Status: DISCONTINUED | OUTPATIENT
Start: 2019-10-31 | End: 2019-11-07

## 2019-10-31 RX ORDER — INSULIN LISPRO 100/ML
10 VIAL (ML) SUBCUTANEOUS
Refills: 0 | Status: DISCONTINUED | OUTPATIENT
Start: 2019-10-31 | End: 2019-11-07

## 2019-10-31 RX ORDER — DEXTROSE 50 % IN WATER 50 %
25 SYRINGE (ML) INTRAVENOUS ONCE
Refills: 0 | Status: DISCONTINUED | OUTPATIENT
Start: 2019-10-31 | End: 2019-11-07

## 2019-10-31 RX ORDER — TAMSULOSIN HYDROCHLORIDE 0.4 MG/1
0.4 CAPSULE ORAL AT BEDTIME
Refills: 0 | Status: DISCONTINUED | OUTPATIENT
Start: 2019-10-31 | End: 2019-11-07

## 2019-10-31 RX ORDER — MEROPENEM 1 G/30ML
1000 INJECTION INTRAVENOUS EVERY 8 HOURS
Refills: 0 | Status: DISCONTINUED | OUTPATIENT
Start: 2019-10-31 | End: 2019-11-04

## 2019-10-31 RX ORDER — DEXTROSE 50 % IN WATER 50 %
12.5 SYRINGE (ML) INTRAVENOUS ONCE
Refills: 0 | Status: DISCONTINUED | OUTPATIENT
Start: 2019-10-31 | End: 2019-11-07

## 2019-10-31 RX ORDER — MEROPENEM 1 G/30ML
500 INJECTION INTRAVENOUS ONCE
Refills: 0 | Status: COMPLETED | OUTPATIENT
Start: 2019-10-31 | End: 2019-10-31

## 2019-10-31 RX ORDER — SODIUM CHLORIDE 9 MG/ML
1000 INJECTION, SOLUTION INTRAVENOUS
Refills: 0 | Status: DISCONTINUED | OUTPATIENT
Start: 2019-10-31 | End: 2019-11-07

## 2019-10-31 RX ORDER — INFLUENZA VIRUS VACCINE 15; 15; 15; 15 UG/.5ML; UG/.5ML; UG/.5ML; UG/.5ML
0.5 SUSPENSION INTRAMUSCULAR ONCE
Refills: 0 | Status: COMPLETED | OUTPATIENT
Start: 2019-10-31 | End: 2019-10-31

## 2019-10-31 RX ORDER — INSULIN LISPRO 100/ML
VIAL (ML) SUBCUTANEOUS
Refills: 0 | Status: DISCONTINUED | OUTPATIENT
Start: 2019-10-31 | End: 2019-11-07

## 2019-10-31 RX ADMIN — MEROPENEM 100 MILLIGRAM(S): 1 INJECTION INTRAVENOUS at 18:44

## 2019-10-31 RX ADMIN — INSULIN GLARGINE 30 UNIT(S): 100 INJECTION, SOLUTION SUBCUTANEOUS at 22:30

## 2019-10-31 RX ADMIN — TAMSULOSIN HYDROCHLORIDE 0.4 MILLIGRAM(S): 0.4 CAPSULE ORAL at 22:31

## 2019-10-31 NOTE — ED PROVIDER NOTE - OBJECTIVE STATEMENT
59 y/o male with pmhx of DM, HTN, prostatitis 1 month ago presents with hematuria. Patient states he began to have hematuria couple of days ago, admits to passing clots and dysuria. Patient had similar symptoms approximately 1.5 month ago, had positive urine culture showing ESBL, was started on bactrim for 1 month by ID, which patient finished recently. Denies fevers/chill, sob chest pain, abdominal pain, n/v/d, flank pain. Patient sees Dr. Fraser as outpatient for urology.

## 2019-10-31 NOTE — H&P ADULT - NSHPLABSRESULTS_GEN_ALL_CORE
12.3   7.17  )-----------( 236      ( 31 Oct 2019 17:10 )             36.2       10-31    135  |  98  |  16  ----------------------------<  313<H>  4.4   |  23  |  1.1    Ca    9.7      31 Oct 2019 17:10                Urinalysis Basic - ( 31 Oct 2019 17:11 )    Color: Light Yellow / Appearance: Clear / S.025 / pH: x  Gluc: x / Ketone: Negative  / Bili: Negative / Urobili: <2 mg/dL   Blood: x / Protein: Trace / Nitrite: Negative   Leuk Esterase: Large / RBC: 2 /HPF /  /HPF   Sq Epi: x / Non Sq Epi: 0 /HPF / Bacteria: Moderate            Lactate Trend            CAPILLARY BLOOD GLUCOSE

## 2019-10-31 NOTE — H&P ADULT - ATTENDING COMMENTS
Patient speaks proficient English for interview, declines need for .     PHYSICAL EXAM:    CONSTITUTIONAL: NAD, nontoxic appearing, comfortable lying on stretcher  ENMT: EOMI, PERRLA, No tonsillar erythema, exudates, or enlargement, neck supple, No JVD  PSYCH: Alert & Oriented X3  RESPIRATORY: Clear to percussion bilaterally; No rales, rhonchi, wheezing, or rubs  CARDIOVASCULAR: Regular rate and rhythm; No murmurs, rubs, or gallops, negative edema  GASTROINTESTINAL: Soft, Nontender, Nondistended; Bowel sounds present, negative bilateral CVA tenderness  EXTREMITIES:  2+ Peripheral Pulses, No clubbing, cyanosis  SKIN: No rashes or lesions    57 yo M with PMHx of HTN, DM II, BPH, Urinary Retention, Prostatitis, last admitted at [facility] in September for Acute Prostatitis secondary to multi-drug resistant E. Coli presented with complaint of dysuria for 2 days duration associated with malodorous urine, hematuria and passage of clots occurring around 16:00, last episode of urination occurred around 20:00 with dark yellow urine. Patient states he had a similar episode of hematuria with clots over 10 years ago in [place] where he had to have a Blackwell catheter in place for some time, unable to further elaborate. Patient denies any fevers, chills, nausea, vomiting, changes in bowel habits, sick contacts or recent travel. 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     #Recurrent Cystitis vs. Prostatitis, Hematuria with clots   -Awaiting Urology evaluation for possible cystoscopy?  -s/p 1 dose of Merrem in ED, continue Merrem (although doubt failure of Bactrim as repeat UA on 9/26 negative) awaiting Infectious Disease evaluation   -Patient remains afebrile, without leukocytosis, monitor fever curve    #BPH, Prostatomegaly   -US Kidney and Bladder reviewed  -Continue     #DM II  -Monitor fingersticks  -Start basal bolus insulin    #HTN   -Continue Enalapril     #Chronic Normocytic Anemia  -Hgb stable, defer to outpatient workup    Disposition: Home when medically stable. Patient speaks proficient English for interview, declines need for .     PHYSICAL EXAM:    CONSTITUTIONAL: NAD, nontoxic appearing, comfortable lying on stretcher  ENMT: EOMI, PERRLA, No tonsillar erythema, exudates, or enlargement, neck supple, No JVD  PSYCH: Alert & Oriented X3  RESPIRATORY: Clear to percussion bilaterally; No rales, rhonchi, wheezing, or rubs  CARDIOVASCULAR: Regular rate and rhythm; No murmurs, rubs, or gallops, negative edema  GASTROINTESTINAL: Soft, Nontender, Nondistended; Bowel sounds present, negative bilateral CVA tenderness  EXTREMITIES:  2+ Peripheral Pulses, No clubbing, cyanosis  SKIN: No rashes or lesions    59 yo M with PMHx of HTN, DM II, BPH, Urinary Retention, Prostatitis, last admitted at [facility] in September for Acute Prostatitis secondary to multi-drug resistant E. Coli presented with complaint of dysuria for 2 days duration associated with malodorous urine, hematuria and passage of clots occurring around 16:00, last episode of urination occurred around 20:00 with dark yellow urine. Patient states he had a similar episode of hematuria with clots over 10 years ago in [place] where he had to have a Blackwell catheter in place for some time, unable to further elaborate. Patient denies any fevers, chills, nausea, vomiting, changes in bowel habits, sick contacts or recent travel. Patient mentions fleeting episode of occipital headache two days ago, now resolved. 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     #Recurrent Cystitis vs. Prostatitis, Hematuria with clots   -Awaiting Urology evaluation for possible cystoscopy?  -s/p 1 dose of Merrem in ED, continue Merrem (although doubt failure of Bactrim as repeat UA on 9/26 negative) awaiting Infectious Disease evaluation   -Patient remains afebrile, without leukocytosis, monitor fever curve    #BPH, Prostatomegaly   -US Kidney and Bladder reviewed  -Continue     #DM II  -Monitor fingersticks  -Start basal bolus insulin    #HTN   -Continue Enalapril     #Chronic Normocytic Anemia  -Hgb stable, defer to outpatient workup    Disposition: Home when medically stable. Patient speaks proficient English for interview, declines need for .     PHYSICAL EXAM:    CONSTITUTIONAL: NAD, nontoxic appearing, comfortable lying on stretcher  ENMT: EOMI, PERRLA, No tonsillar erythema, exudates, or enlargement, neck supple, No JVD  PSYCH: Alert & Oriented X3  RESPIRATORY: Clear to percussion bilaterally; No rales, rhonchi, wheezing, or rubs  CARDIOVASCULAR: Regular rate and rhythm; No murmurs, rubs, or gallops, negative edema  GASTROINTESTINAL: Soft, Nontender, Nondistended; Bowel sounds present, negative bilateral CVA tenderness  EXTREMITIES:  2+ Peripheral Pulses, No clubbing, cyanosis  SKIN: No rashes or lesions  GENITALS: Patient declined need for male chaperone, negative penile or testicular lesions, negative inguinal lympadenopathy    57 yo M with PMHx of HTN, DM II, BPH, Urinary Retention, Prostatitis, last admitted at [facility] in September for Acute Prostatitis secondary to multi-drug resistant E. Coli presented with complaint of dysuria for 2 days duration associated with malodorous urine, hematuria and passage of clots occurring around 16:00, last episode of urination occurred around 20:00 with dark yellow urine. Patient states he had a similar episode of hematuria with clots over 10 years ago in [place] where he had to have a Blackwell catheter in place for some time, unable to further elaborate. Patient denies any fevers, chills, nausea, vomiting, changes in bowel habits, sick contacts or recent travel. Patient mentions fleeting episode of occipital headache two days ago, now resolved. 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     #Recurrent Cystitis vs. Prostatitis, Hematuria with clots   -Awaiting Urology evaluation for possible cystoscopy?  -s/p 1 dose of Merrem in ED, continue Merrem (although doubt failure of Bactrim as repeat UA on 9/26 negative) awaiting Infectious Disease evaluation   -Patient remains afebrile, without leukocytosis, monitor fever curve    #BPH, Prostatomegaly   -US Kidney and Bladder reviewed  -Continue     #DM II  -Monitor fingersticks  -Start basal bolus insulin    #HTN   -Continue Enalapril     #Chronic Normocytic Anemia  -Hgb stable, defer to outpatient workup    Disposition: Home when medically stable.

## 2019-10-31 NOTE — ED PROVIDER NOTE - CLINICAL SUMMARY MEDICAL DECISION MAKING FREE TEXT BOX
59 yo male with hematuria , UTI, h/o ESBL in urine, hemodynamically stable, KUB done and appears negative for acute pathology, given h/o abx resistance and prior admissions will give IV abx and admit.  in-house urology consult.

## 2019-10-31 NOTE — ED PROVIDER NOTE - ATTENDING CONTRIBUTION TO CARE
59 yo male PMH as noted c/o hematuria associated with dysuria for 2 days. No fever, chills, flank pain, abdominal pain, flank pain.  Well-appearing male, NAD, exam as noted.,  Will check labs including UA /culture, give abx, likely admit for IV abx.

## 2019-10-31 NOTE — H&P ADULT - NSHPREVIEWOFSYSTEMS_GEN_ALL_CORE
CONSTITUTIONAL: No weakness, fevers or chills  EYES/ENT: No visual changes;  No vertigo or throat pain   RESPIRATORY: No cough, wheezing, hemoptysis; No shortness of breath  CARDIOVASCULAR: No chest pain or palpitations  GASTROINTESTINAL: No abdominal or epigastric pain. No nausea, vomiting, or hematemesis; No diarrhea or constipation. No melena or hematochezia.  GENITOURINARY: Dysuria, hematuria  NEUROLOGICAL: No numbness or weakness  SKIN: No itching, rashes

## 2019-10-31 NOTE — H&P ADULT - NSICDXPASTMEDICALHX_GEN_ALL_CORE_FT
PAST MEDICAL HISTORY:  BPH with urinary obstruction     Diabetes     HTN (hypertension)     Prostatitis

## 2019-10-31 NOTE — H&P ADULT - HISTORY OF PRESENT ILLNESS
58 yr old M with PMHx of prostatitis 1 month ago, HTN, DM-2 , BPH presents to the ED for hematuria and dysuria. He mentions that he started to see blood in his urine since yesterday and is feeling some burning on urination. He denies pus or foul smelling urine, no fever/chills, no chest pain, sob, abd pain. He endorses intermittent headache and low back pain but no flank pain. He was admitted 1 month ago for acute prostatitis and was found to have ESBL E.coli, was started on meropenem and was discharged on Bactrim PO.  In ED, VS wnl, UA positive for WBC (>100), large leuk esterase, and glycosuria. Us abdomen showed prostatomegaly with prostate volume of 144 mL.

## 2019-10-31 NOTE — H&P ADULT - NSHPSOCIALHISTORY_GEN_ALL_CORE
- Non smoker  - No alcohol use  - No illicit drug use  -  has kids, wife and kids live in [place]  - Sexually active with wife, no extra-marital affairs

## 2019-10-31 NOTE — H&P ADULT - ASSESSMENT
58 yr old M with PMHx of prostatitis 1 month ago, HTN, DM-2 , BPH admitted for complicated UTI    # Recurrent complicated UTI - Likely prostatitis:  - Was on Bactrim at home for a total of 10 days, recurrence 1 month after the previous episode. Sensitivity for bactrim was <32 ( E coli ESBL).  - UA positive for large leuk est and WBC  - Will start on meropenem 1g IV q8h ; F/up urine culture  - F/up ID consult   - F/up urology consult ( Dr. ramirez/kyra)  - No need for bagley now, patient passing urine  - Trend CBC qd and maintain active T&S    # DM2:  - Last A1C was 8 ( in september 2019)  - Start on insulin lispro 10 U qac and lantus 30 U qhs  - Monitor FS qac/hs and adjust accordingly    # HTN:  - C/w enalapril 10 mg q12h Po    # BPH:  - C/w flomax 0.4 mg qd    # Diet: Carb consistent DASH  # DVT ppx: lovenox 40 mg qd  # GI ppx: none  # Activity: ambulate as tolerated  # Dispo: From home  # Code status: FULL

## 2019-10-31 NOTE — ED PROVIDER NOTE - NS ED ROS FT
Constitutional: See HPI.  Eyes: No visual changes, eye pain or discharge.  ENMT: No hearing changes, pain, discharge or infections. No neck pain or stiffness.  Cardiac: No chest pain, SOB or edema. No chest pain with exertion.  Respiratory: No cough or respiratory distress.   GI: No nausea, vomiting, diarrhea or abdominal pain.  : +hematuria, dysuria; No frequency or burning.  MS: No myalgia, muscle weakness, joint pain or back pain.  Neuro: No headache or weakness. No LOC.  Skin: No skin rash.  Endo: No hx of DM, thyroid disease  Except as documented in HPI, all other review of systems is negative

## 2019-10-31 NOTE — H&P ADULT - NSHPPHYSICALEXAM_GEN_ALL_CORE
GEN: No acute distress, NC/AT, anicteric  LUNGS: Clear to auscultation bilaterally, no wheezing  HEART: S1/S2 present. RRR. No murmurs, no rubs  ABD: Soft, non-tender, non-distended. Bowel sounds present  EXT: NC/NC/NE/2+PP/OSEI/Skin Intact.   NEURO: AAOX3, moves all extremities,    Intravenous access: Peripheral access  NG tube: None  Blackwell Catheter: None

## 2019-10-31 NOTE — PATIENT PROFILE ADULT - DO YOU FEEL UNSAFE AT HOME, WORK, OR SCHOOL?
"Returned patient call, patient reports he chipped a tooth and it is "cutting into me". Patient has dentist appointment at 2pm today. Reviewed with : ok for patient to go to dentist today.  Patient informed , and instructed that his dentist may prescribe an antibiotic prior to any dental procedures.    " no

## 2019-10-31 NOTE — ED ADULT TRIAGE NOTE - CHIEF COMPLAINT QUOTE
pt presents to ED c/o urinating blood x 2, c/o burning upon urination. Pt also c/o slight abdominal pain x 1 day.

## 2019-11-01 LAB
ANION GAP SERPL CALC-SCNC: 14 MMOL/L — SIGNIFICANT CHANGE UP (ref 7–14)
BASOPHILS # BLD AUTO: 0.03 K/UL — SIGNIFICANT CHANGE UP (ref 0–0.2)
BASOPHILS NFR BLD AUTO: 0.4 % — SIGNIFICANT CHANGE UP (ref 0–1)
BUN SERPL-MCNC: 18 MG/DL — SIGNIFICANT CHANGE UP (ref 10–20)
CALCIUM SERPL-MCNC: 9.7 MG/DL — SIGNIFICANT CHANGE UP (ref 8.5–10.1)
CHLORIDE SERPL-SCNC: 98 MMOL/L — SIGNIFICANT CHANGE UP (ref 98–110)
CO2 SERPL-SCNC: 26 MMOL/L — SIGNIFICANT CHANGE UP (ref 17–32)
CREAT SERPL-MCNC: 0.8 MG/DL — SIGNIFICANT CHANGE UP (ref 0.7–1.5)
EOSINOPHIL # BLD AUTO: 0.15 K/UL — SIGNIFICANT CHANGE UP (ref 0–0.7)
EOSINOPHIL NFR BLD AUTO: 1.8 % — SIGNIFICANT CHANGE UP (ref 0–8)
GLUCOSE BLDC GLUCOMTR-MCNC: 137 MG/DL — HIGH (ref 70–99)
GLUCOSE BLDC GLUCOMTR-MCNC: 138 MG/DL — HIGH (ref 70–99)
GLUCOSE BLDC GLUCOMTR-MCNC: 192 MG/DL — HIGH (ref 70–99)
GLUCOSE BLDC GLUCOMTR-MCNC: 214 MG/DL — HIGH (ref 70–99)
GLUCOSE SERPL-MCNC: 136 MG/DL — HIGH (ref 70–99)
HCT VFR BLD CALC: 38.7 % — LOW (ref 42–52)
HGB BLD-MCNC: 12.9 G/DL — LOW (ref 14–18)
IMM GRANULOCYTES NFR BLD AUTO: 0.4 % — HIGH (ref 0.1–0.3)
LYMPHOCYTES # BLD AUTO: 2.2 K/UL — SIGNIFICANT CHANGE UP (ref 1.2–3.4)
LYMPHOCYTES # BLD AUTO: 26.6 % — SIGNIFICANT CHANGE UP (ref 20.5–51.1)
MAGNESIUM SERPL-MCNC: 1.8 MG/DL — SIGNIFICANT CHANGE UP (ref 1.8–2.4)
MCHC RBC-ENTMCNC: 30.1 PG — SIGNIFICANT CHANGE UP (ref 27–31)
MCHC RBC-ENTMCNC: 33.3 G/DL — SIGNIFICANT CHANGE UP (ref 32–37)
MCV RBC AUTO: 90.4 FL — SIGNIFICANT CHANGE UP (ref 80–94)
MONOCYTES # BLD AUTO: 0.57 K/UL — SIGNIFICANT CHANGE UP (ref 0.1–0.6)
MONOCYTES NFR BLD AUTO: 6.9 % — SIGNIFICANT CHANGE UP (ref 1.7–9.3)
NEUTROPHILS # BLD AUTO: 5.3 K/UL — SIGNIFICANT CHANGE UP (ref 1.4–6.5)
NEUTROPHILS NFR BLD AUTO: 63.9 % — SIGNIFICANT CHANGE UP (ref 42.2–75.2)
NRBC # BLD: 0 /100 WBCS — SIGNIFICANT CHANGE UP (ref 0–0)
PLATELET # BLD AUTO: 240 K/UL — SIGNIFICANT CHANGE UP (ref 130–400)
POTASSIUM SERPL-MCNC: 4.3 MMOL/L — SIGNIFICANT CHANGE UP (ref 3.5–5)
POTASSIUM SERPL-SCNC: 4.3 MMOL/L — SIGNIFICANT CHANGE UP (ref 3.5–5)
RBC # BLD: 4.28 M/UL — LOW (ref 4.7–6.1)
RBC # FLD: 13.3 % — SIGNIFICANT CHANGE UP (ref 11.5–14.5)
SODIUM SERPL-SCNC: 138 MMOL/L — SIGNIFICANT CHANGE UP (ref 135–146)
WBC # BLD: 8.28 K/UL — SIGNIFICANT CHANGE UP (ref 4.8–10.8)
WBC # FLD AUTO: 8.28 K/UL — SIGNIFICANT CHANGE UP (ref 4.8–10.8)

## 2019-11-01 RX ORDER — ALPRAZOLAM 0.25 MG
0.5 TABLET ORAL ONCE
Refills: 0 | Status: DISCONTINUED | OUTPATIENT
Start: 2019-11-01 | End: 2019-11-01

## 2019-11-01 RX ORDER — LANOLIN ALCOHOL/MO/W.PET/CERES
5 CREAM (GRAM) TOPICAL ONCE
Refills: 0 | Status: COMPLETED | OUTPATIENT
Start: 2019-11-01 | End: 2019-11-01

## 2019-11-01 RX ADMIN — Medication 10 UNIT(S): at 18:06

## 2019-11-01 RX ADMIN — MEROPENEM 100 MILLIGRAM(S): 1 INJECTION INTRAVENOUS at 22:33

## 2019-11-01 RX ADMIN — Medication 1: at 18:06

## 2019-11-01 RX ADMIN — TAMSULOSIN HYDROCHLORIDE 0.4 MILLIGRAM(S): 0.4 CAPSULE ORAL at 22:38

## 2019-11-01 RX ADMIN — Medication 10 MILLIGRAM(S): at 05:00

## 2019-11-01 RX ADMIN — MEROPENEM 100 MILLIGRAM(S): 1 INJECTION INTRAVENOUS at 05:00

## 2019-11-01 RX ADMIN — Medication 10 UNIT(S): at 13:27

## 2019-11-01 RX ADMIN — Medication 10 UNIT(S): at 09:03

## 2019-11-01 RX ADMIN — INSULIN GLARGINE 30 UNIT(S): 100 INJECTION, SOLUTION SUBCUTANEOUS at 22:37

## 2019-11-01 RX ADMIN — Medication 10 MILLIGRAM(S): at 18:14

## 2019-11-01 RX ADMIN — MEROPENEM 100 MILLIGRAM(S): 1 INJECTION INTRAVENOUS at 15:43

## 2019-11-01 RX ADMIN — Medication 0.5 MILLIGRAM(S): at 20:26

## 2019-11-01 NOTE — CONSULT NOTE ADULT - ASSESSMENT
ASSESSMENT  58 yr old M with PMHx of prostatitis 1 month ago ucx ESBL (S bactrim), HTN, DM-2 , BPH admitted for hematuria    IMPRESSION  #Hematuria/Dysuria concerning for Recurrent complicated UTI /prostatitis  - hx of prostatitis 1 month ago (ESBL e. coli) , d/c'ed on 6 weeks of bactrim, unclear if pt took the entire course  - UA positive for large leuk est and WBC  - urology following   - U/S kidney and bladder: Prostatomegaly with approximate prostate volume of 144 mL.   #Sepsis ruled out on admission     RECOMMENDATIONS  - continue meropenem 1g q8   - F/u repeat urine culture  - if urine culture is negative there is concern that the patient did not complete PO bactrim regimen s/p last discharge and would d/c on PO bactrim 1 DS BID x 10 days  - If +, target therapy  - Urology

## 2019-11-01 NOTE — CONSULT NOTE ADULT - ASSESSMENT
57 yo male admitted for uti, pt was experiencing burning and hematuria    afebrile, non toxic    sonogram shows severe prostatomegaly, no issues emptying bladder    plan  - ucx  - save urine in urinal at bedside for  team to evaluate   - abx  - Flomax  - op f/u with Dr. Fraser 57 yo male admitted for uti, pt was experiencing burning and hematuria    afebrile, non toxic    sonogram shows severe prostatomegaly, no issues emptying bladder    plan  - ucx  - save urine in urinal at bedside for  team to evaluate   - abx  - Flomax  - glycemic control   - op f/u with Dr. Fraser 57 yo male admitted for uti, pt was experiencing burning and hematuria    afebrile, non toxic    sonogram shows severe prostatomegaly, no issues emptying bladder    plan  - ucx  - save urine in urinal at bedside for  team to evaluate   - abx  - Flomax + finasteride  - glycemic control   - op f/u with Dr. Fraser

## 2019-11-01 NOTE — CONSULT NOTE ADULT - ATTENDING COMMENTS
agree with above  pt seen on november 1st 2019  continue with flomax, start finasteride given large prostate  follow up labs and vitals
I have personally examined the patient and reviewed the documentation above.  Corrections and edits were made wherever needed.

## 2019-11-01 NOTE — CONSULT NOTE ADULT - SUBJECTIVE AND OBJECTIVE BOX
58 yr old M with PMHx of prostatitis 1 month ago, HTN, DM-2 , BPH presents to the ED for hematuria and dysuria. He mentions that he started to see blood in his urine since yesterday and is feeling some burning on urination. He denies pus or foul smelling urine, no fever/chills, no chest pain, sob, abd pain. He endorses intermittent headache and low back pain but no flank pain. He was admitted 1 month ago for acute prostatitis and was found to have ESBL E.coli, was started on meropenem and was discharged on Bactrim PO.  In ED, VS wnl, UA positive for WBC (>100), large leuk esterase, and glycosuria. Us abdomen showed prostatomegaly with prostate volume of 144 mL.     HPI: pt was seen in the office by Dr. Fraser 10/9/19, started on Flomax and was scheduled for renal/bladder sonogram in a few days, pt denies issues voiding but noted blood tinged urine and dysuria, no n/v/f/c     PAST MEDICAL & SURGICAL HISTORY:  Prostatitis  BPH with urinary obstruction  Diabetes  HTN (hypertension)  No significant past surgical history    MEDICATIONS  (STANDING):  enalapril 10 milliGRAM(s) Oral two times a day  enoxaparin Injectable 40 milliGRAM(s) SubCutaneous daily  influenza   Vaccine 0.5 milliLiter(s) IntraMuscular once  insulin glargine Injectable (LANTUS) 30 Unit(s) SubCutaneous at bedtime  meropenem  IVPB 1000 milliGRAM(s) IV Intermittent every 8 hours  tamsulosin Oral Tab/Cap - Peds 0.4 milliGRAM(s) Oral at bedtime    Allergies    No Known Allergies    Intolerances    SHx - no illicit drug use    FHX - NC    ROS - negative except as stated in HPI    Vital Signs Last 24 Hrs  T(C): 36.5 (31 Oct 2019 23:40), Max: 36.7 (31 Oct 2019 16:30)  T(F): 97.7 (31 Oct 2019 23:40), Max: 98.1 (31 Oct 2019 16:30)  HR: 72 (2019 04:49) (69 - 87)  BP: 126/62 (2019 04:49) (119/58 - 157/68)  RR: 18 (31 Oct 2019 23:40) (18 - 20)  SpO2: 97% (31 Oct 2019 23:40) (97% - 100%)    pt seen and examined at bedside  a+ox3, nad, non toxic  nc/at, perrl  skin warm, good tugor  abd - soft, nd, nttp, no spt  gu - no Blackwell, pt voiding on his own                          12.3   7.17  )-----------( 236      ( 31 Oct 2019 17:10 )             36.2     10-31    135  |  98  |  16  ----------------------------<  313<H>  4.4   |  23  |  1.1    Ca    9.7      31 Oct 2019 17:10    Urinalysis Basic - ( 31 Oct 2019 17:11 )    Color: Light Yellow / Appearance: Clear / S.025 / pH: x  Gluc: x / Ketone: Negative  / Bili: Negative / Urobili: <2 mg/dL   Blood: x / Protein: Trace / Nitrite: Negative   Leuk Esterase: Large / RBC: 2 /HPF /  /HPF   Sq Epi: x / Non Sq Epi: 0 /HPF / Bacteria: Moderate    < from: US Kidney and Bladder (10.31.19 @ 17:53) >  FINDINGS:    RIGHT KIDNEY: Normal in echogenicity, size measuring 12.5 cm in length.   No evidence of hydronephrosis or calculus. Vascular flow is demonstrated   at the hilum.    LEFT KIDNEY: Normal in echogenicity, size measuring 12.2 cm in length. No   evidence of hydronephrosis or calculus. Vascular flow is demonstrated at   the hilum.     URINARY BLADDER: Prevoid volume of approximately 25 mL. Postvoid residual   not performed as patient voided prior to the exam. Nondistended bladder   is limited in evaluation    PROSTATE: Prostate volume approximately 144 mL      IMPRESSION:    No hydronephrosis    Prostatomegaly with approximate prostate volume of 144 mL.

## 2019-11-01 NOTE — CONSULT NOTE ADULT - SUBJECTIVE AND OBJECTIVE BOX
ARTHUR DE LEÓN  58y, Male  Allergy: No Known Allergies      CHIEF COMPLAINT: Hematuria and dysuria (2019 06:27)      HPI:  58 yr old M with PMHx of prostatitis 1 month ago, HTN, DM-2 , BPH presents to the ED for hematuria and dysuria. He mentions that he started to see blood in his urine since yesterday and is feeling some burning on urination. He denies pus or foul smelling urine, no fever/chills, no chest pain, sob, abd pain. He endorses intermittent headache and low back pain but no flank pain. He was admitted 1 month ago for acute prostatitis and was found to have ESBL E.coli, was started on meropenem and was discharged on Bactrim PO.  In ED, VS wnl, UA positive for WBC (>100), large leuk esterase, and glycosuria. Us abdomen showed prostatomegaly with prostate volume of 144 mL. (31 Oct 2019 21:36)    FAMILY HISTORY:  FH: heart disease: Father  FH: type 2 diabetes: mother .father .sisters    PAST MEDICAL & SURGICAL HISTORY:  Prostatitis  BPH with urinary obstruction  Diabetes  HTN (hypertension)  No significant past surgical history      SOCIAL HISTORY    Substance Use (  ) never used  (  ) IVDU (  ) Other:  Tobacco Usage:  (   ) never smoked   (   ) former smoker   (   ) current smoker   Alcohol Usage: (   ) social  (   ) daily use (   ) denies  Sexual History:       ROS  General: Denies rigors, nightsweats  HEENT: Denies headache, rhinorrhea, sore throat, eye pain  CV: Denies CP, palpitations  PULM: Denies wheezing, hemoptysis  GI: Denies hematemesis, hematochezia, melena  : Denies discharge, hematuria  MSK: Denies arthralgias, myalgias  SKIN: Denies rash, lesions  NEURO: Denies paresthesias, weakness  PSYCH: Denies depression, anxiety    VITALS:  T(F): 97.1, Max: 98.1 (10-31-19 @ 16:30)  HR: 72  BP: 164/77  RR: 18Vital Signs Last 24 Hrs  T(C): 36.2 (2019 07:59), Max: 36.7 (31 Oct 2019 16:30)  T(F): 97.1 (2019 07:59), Max: 98.1 (31 Oct 2019 16:30)  HR: 72 (2019 07:59) (69 - 87)  BP: 164/77 (2019 07:59) (119/58 - 164/77)  BP(mean): --  RR: 18 (2019 07:59) (18 - 20)  SpO2: 98% (2019 07:59) (97% - 100%)    PHYSICAL EXAM:  Gen: NAD, resting in bed  HEENT: Normocephalic, atraumatic  Neck: supple, no lymphadenopathy  CV: Regular rate & regular rhythm  Lungs: decreased BS at bases  Abdomen: Soft, BS present  Ext: Warm, well perfused  Neuro: non focal, awake  Skin: no rash, no erythema    TESTS & MEASUREMENTS:                        12.9   8.28  )-----------( 240      ( 2019 08:18 )             38.7         138  |  98  |  18  ----------------------------<  136<H>  4.3   |  26  |  0.8    Ca    9.7      2019 08:18  Mg     1.8           eGFR if Non African American: 98 mL/min/1.73M2 (19 @ 08:18)  eGFR if : 113 mL/min/1.73M2 (19 @ 08:18)  eGFR if Non African American: 73 mL/min/1.73M2 (10-31-19 @ 17:10)  eGFR if : 85 mL/min/1.73M2 (10-31-19 @ 17:10)      Urinalysis Basic - ( 31 Oct 2019 17:11 )    Color: Light Yellow / Appearance: Clear / S.025 / pH: x  Gluc: x / Ketone: Negative  / Bili: Negative / Urobili: <2 mg/dL   Blood: x / Protein: Trace / Nitrite: Negative   Leuk Esterase: Large / RBC: 2 /HPF /  /HPF   Sq Epi: x / Non Sq Epi: 0 /HPF / Bacteria: Moderate              INFECTIOUS DISEASES TESTING      RADIOLOGY & ADDITIONAL TESTS:  I have personally reviewed the last Chest xray  CXR      CT      CARDIOLOGY TESTING      All available historical records have been reviewed    MEDICATIONS  dextrose 5%. 1000  dextrose 50% Injectable 12.5  dextrose 50% Injectable 25  dextrose 50% Injectable 25  enalapril 10  enoxaparin Injectable 40  influenza   Vaccine 0.5  insulin glargine Injectable (LANTUS) 30  insulin lispro (HumaLOG) corrective regimen sliding scale   insulin lispro Injectable (HumaLOG) 10  meropenem  IVPB 1000  tamsulosin Oral Tab/Cap - Peds 0.4      ANTIBIOTICS:  meropenem  IVPB 1000 milliGRAM(s) IV Intermittent every 8 hours      All available historical data has been reviewed    ASSESSMENT  58 yr old M with PMHx of prostatitis 1 month ago, HTN, DM-2 , BPH admitted for prostatitis vs recurrent cystitis     IMPRESSION  #recurrent complicated UTI   - hx of prostatitis 1 month ago (ESBL e. coli)   10 day course of bactrim PO  - UA positive for large leuk est and WBC  - urology following   - U/S kidney and bladder: Prostatomegaly with approximate prostate volume of 144 mL.       RECOMMENDATIONS  - F/up urine culture    This is a pended note. All final recommendations to follow pending discussion with ID Attending ARTHUR DE LEÓN  58y, Male  Allergy: No Known Allergies      CHIEF COMPLAINT: Hematuria and dysuria (2019 06:27)      HPI:  58 yr old M with PMHx of prostatitis 1 month ago, HTN, DM-2 , BPH presents to the ED for hematuria and dysuria. He mentions that he started to see blood in his urine since yesterday and is feeling some burning on urination. He denies pus or foul smelling urine, no fever/chills, no chest pain, sob, abd pain. He endorses intermittent headache and low back pain but no flank pain. He was admitted 1 month ago for acute prostatitis and was found to have ESBL E.coli, was started on meropenem and was discharged on Bactrim PO.  In ED, VS wnl, UA positive for WBC (>100), large leuk esterase, and glycosuria. Us abdomen showed prostatomegaly with prostate volume of 144 mL. (31 Oct 2019 21:36)    FAMILY HISTORY:  FH: heart disease: Father  FH: type 2 diabetes: mother .father .sisters    PAST MEDICAL & SURGICAL HISTORY:  Prostatitis  BPH with urinary obstruction  Diabetes  HTN (hypertension)  No significant past surgical history      SOCIAL HISTORY    Substance Use (  ) never used  (  ) IVDU (  ) Other:  Tobacco Usage:  (   ) never smoked   (   ) former smoker   (   ) current smoker   Alcohol Usage: (   ) social  (   ) daily use (   ) denies  Sexual History:       ROS  General: Denies rigors, nightsweats  HEENT: Denies headache, rhinorrhea, sore throat, eye pain  CV: Denies CP, palpitations  PULM: Denies wheezing, hemoptysis  GI: Denies hematemesis, hematochezia, melena  : Denies discharge, hematuria  MSK: Denies arthralgias, myalgias  SKIN: Denies rash, lesions  NEURO: Denies paresthesias, weakness  PSYCH: Denies depression, anxiety    VITALS:  T(F): 97.1, Max: 98.1 (10-31-19 @ 16:30)  HR: 72  BP: 164/77  RR: 18Vital Signs Last 24 Hrs  T(C): 36.2 (2019 07:59), Max: 36.7 (31 Oct 2019 16:30)  T(F): 97.1 (2019 07:59), Max: 98.1 (31 Oct 2019 16:30)  HR: 72 (2019 07:59) (69 - 87)  BP: 164/77 (2019 07:59) (119/58 - 164/77)  BP(mean): --  RR: 18 (2019 07:59) (18 - 20)  SpO2: 98% (2019 07:59) (97% - 100%)    PHYSICAL EXAM:  Gen: NAD, resting in bed  HEENT: Normocephalic, atraumatic  Neck: supple, no lymphadenopathy  CV: Regular rate & regular rhythm  Lungs: decreased BS at bases  Abdomen: Soft, BS present  Ext: Warm, well perfused  Neuro: non focal, awake  Skin: no rash, no erythema    TESTS & MEASUREMENTS:                        12.9   8.28  )-----------( 240      ( 2019 08:18 )             38.7         138  |  98  |  18  ----------------------------<  136<H>  4.3   |  26  |  0.8    Ca    9.7      2019 08:18  Mg     1.8           eGFR if Non African American: 98 mL/min/1.73M2 (19 @ 08:18)  eGFR if : 113 mL/min/1.73M2 (19 @ 08:18)  eGFR if Non African American: 73 mL/min/1.73M2 (10-31-19 @ 17:10)  eGFR if : 85 mL/min/1.73M2 (10-31-19 @ 17:10)      Urinalysis Basic - ( 31 Oct 2019 17:11 )    Color: Light Yellow / Appearance: Clear / S.025 / pH: x  Gluc: x / Ketone: Negative  / Bili: Negative / Urobili: <2 mg/dL   Blood: x / Protein: Trace / Nitrite: Negative   Leuk Esterase: Large / RBC: 2 /HPF /  /HPF   Sq Epi: x / Non Sq Epi: 0 /HPF / Bacteria: Moderate              INFECTIOUS DISEASES TESTING      RADIOLOGY & ADDITIONAL TESTS:  I have personally reviewed the last Chest xray  CXR      CT      CARDIOLOGY TESTING      All available historical records have been reviewed    MEDICATIONS  dextrose 5%. 1000  dextrose 50% Injectable 12.5  dextrose 50% Injectable 25  dextrose 50% Injectable 25  enalapril 10  enoxaparin Injectable 40  influenza   Vaccine 0.5  insulin glargine Injectable (LANTUS) 30  insulin lispro (HumaLOG) corrective regimen sliding scale   insulin lispro Injectable (HumaLOG) 10  meropenem  IVPB 1000  tamsulosin Oral Tab/Cap - Peds 0.4      ANTIBIOTICS:  meropenem  IVPB 1000 milliGRAM(s) IV Intermittent every 8 hours      All available historical data has been reviewed    ASSESSMENT  58 yr old M with PMHx of prostatitis 1 month ago, HTN, DM-2 , BPH admitted for prostatitis vs recurrent cystitis     IMPRESSION  #recurrent complicated UTI   - hx of prostatitis 1 month ago (ESBL e. coli)   10 day course of bactrim PO  - UA positive for large leuk est and WBC  - urology following   - U/S kidney and bladder: Prostatomegaly with approximate prostate volume of 144 mL.       RECOMMENDATIONS  - continue meropenem 1g q8   - F/u repeat urine culture  -if urine culture is negative there is concern that the patient did not complete PO bactrim regimen s/p last discharge     - if urine culture is negative DC patient on PO bactrim 1 DS BID.     This is a pended note. All final recommendations to follow pending discussion with ID Attending ARTHUR DE LEÓN  58y, Male  Allergy: No Known Allergies      CHIEF COMPLAINT: Hematuria and dysuria (2019 06:27)      HPI:  58 yr old M with PMHx of prostatitis 1 month ago, HTN, DM-2 , BPH presents to the ED for hematuria and dysuria. He mentions that he started to see blood in his urine since yesterday and is feeling some burning on urination. He denies pus or foul smelling urine, no fever/chills, no chest pain, sob, abd pain. He endorses intermittent headache and low back pain but no flank pain. He was admitted 1 month ago for acute prostatitis and was found to have ESBL E.coli, was started on meropenem and was discharged on Bactrim PO.  In ED, VS wnl, UA positive for WBC (>100), large leuk esterase, and glycosuria. Us abdomen showed prostatomegaly with prostate volume of 144 mL. (31 Oct 2019 21:36)    hx of prostatitis 1 month ago (ESBL e. coli) , d/c'ed on 6 weeks of bactrim, unclear if pt took the entire course    FAMILY HISTORY:  FH: heart disease: Father  FH: type 2 diabetes: mother .father .sisters    PAST MEDICAL & SURGICAL HISTORY:  Prostatitis  BPH with urinary obstruction  Diabetes  HTN (hypertension)  No significant past surgical history      SOCIAL HISTORY    Pt denies any current heavy ETOH use, IVDU. No recent travel outside the [place].       ROS  General: Denies rigors, nightsweats  HEENT: Denies headache, rhinorrhea, sore throat, eye pain  CV: Denies CP, palpitations  PULM: Denies wheezing, hemoptysis  GI: Denies hematemesis, hematochezia, melena  : Denies discharge, hematuria  MSK: Denies arthralgias, myalgias  SKIN: Denies rash, lesions  NEURO: Denies paresthesias, weakness  PSYCH: Denies depression, anxiety    VITALS:  T(F): 97.1, Max: 98.1 (10-31-19 @ 16:30)  HR: 72  BP: 164/77  RR: 18Vital Signs Last 24 Hrs  T(C): 36.2 (2019 07:59), Max: 36.7 (31 Oct 2019 16:30)  T(F): 97.1 (2019 07:59), Max: 98.1 (31 Oct 2019 16:30)  HR: 72 (2019 07:59) (69 - 87)  BP: 164/77 (2019 07:59) (119/58 - 164/77)  BP(mean): --  RR: 18 (2019 07:59) (18 - 20)  SpO2: 98% (2019 07:59) (97% - 100%)    PHYSICAL EXAM:  Gen: NAD, resting in bed  HEENT: Normocephalic, atraumatic  Neck: supple, no lymphadenopathy  CV: Regular rate & regular rhythm  Lungs: decreased BS at bases  Abdomen: Soft, BS present  : wnl, no scrotal TTP, no discharge  Ext: Warm, well perfused  Neuro: non focal, awake  Skin: no rash, no erythema    TESTS & MEASUREMENTS:                        12.9   8.28  )-----------( 240      ( 2019 08:18 )             38.7         138  |  98  |  18  ----------------------------<  136<H>  4.3   |  26  |  0.8    Ca    9.7      2019 08:18  Mg     1.8           eGFR if Non African American: 98 mL/min/1.73M2 (19 @ 08:18)  eGFR if : 113 mL/min/1.73M2 (19 @ 08:18)  eGFR if Non African American: 73 mL/min/1.73M2 (10-31-19 @ 17:10)  eGFR if : 85 mL/min/1.73M2 (10-31-19 @ 17:10)      Urinalysis Basic - ( 31 Oct 2019 17:11 )    Color: Light Yellow / Appearance: Clear / S.025 / pH: x  Gluc: x / Ketone: Negative  / Bili: Negative / Urobili: <2 mg/dL   Blood: x / Protein: Trace / Nitrite: Negative   Leuk Esterase: Large / RBC: 2 /HPF /  /HPF   Sq Epi: x / Non Sq Epi: 0 /HPF / Bacteria: Moderate              INFECTIOUS DISEASES TESTING      RADIOLOGY & ADDITIONAL TESTS:  I have personally reviewed the last Chest xray  CXR      CT      CARDIOLOGY TESTING      All available historical records have been reviewed    MEDICATIONS  dextrose 5%. 1000  dextrose 50% Injectable 12.5  dextrose 50% Injectable 25  dextrose 50% Injectable 25  enalapril 10  enoxaparin Injectable 40  influenza   Vaccine 0.5  insulin glargine Injectable (LANTUS) 30  insulin lispro (HumaLOG) corrective regimen sliding scale   insulin lispro Injectable (HumaLOG) 10  meropenem  IVPB 1000  tamsulosin Oral Tab/Cap - Peds 0.4      ANTIBIOTICS:  meropenem  IVPB 1000 milliGRAM(s) IV Intermittent every 8 hours      All available historical data has been reviewed

## 2019-11-01 NOTE — PROGRESS NOTE ADULT - ASSESSMENT
ARTHUR DE LEÓN 58y Male  MRN#: [id number]   CODE STATUS: FULL      SUBJECTIVE  Patient is a 58y old Male who presents with a chief complaint of Hematuria and dysuria (2019 10:00)    Currently admitted to medicine with the primary diagnosis of hematuria.     Today is hospital day 1d, and this morning he is laying in bed comfortably and reports no overnight events.   He denies chest pain, shortness of breath, nausea, vomiting or changes in bowel habits.  He  has no complaints today.     OBJECTIVE  PAST MEDICAL & SURGICAL HISTORY  Prostatitis  BPH with urinary obstruction  Diabetes  HTN (hypertension)  No significant past surgical history    ALLERGIES:  No Known Allergies    HOME MEDICATIONS:  Home Medications:  enalapril 10 mg oral tablet: 1 tab(s) orally 2 times a day (24 Sep 2019 15:15)  Januvia 50 mg oral tablet: 1 tab(s) orally once a day (22 Sep 2019 13:17)  metFORMIN 1000 mg oral tablet: 1 tab(s) orally 2 times a day (22 Sep 2019 13:17)  tamsulosin 0.4 mg oral capsule: 1 cap(s) orally once a day (at bedtime) (24 Sep 2019 15:15)    MEDICATIONS:  STANDING MEDICATIONS  dextrose 5%. 1000 milliLiter(s) (50 mL/Hr) IV Continuous <Continuous>  dextrose 50% Injectable 12.5 Gram(s) IV Push once  dextrose 50% Injectable 25 Gram(s) IV Push once  dextrose 50% Injectable 25 Gram(s) IV Push once  enalapril 10 milliGRAM(s) Oral two times a day  enoxaparin Injectable 40 milliGRAM(s) SubCutaneous daily  influenza   Vaccine 0.5 milliLiter(s) IntraMuscular once  insulin glargine Injectable (LANTUS) 30 Unit(s) SubCutaneous at bedtime  insulin lispro (HumaLOG) corrective regimen sliding scale   SubCutaneous three times a day before meals  insulin lispro Injectable (HumaLOG) 10 Unit(s) SubCutaneous three times a day before meals  meropenem  IVPB 1000 milliGRAM(s) IV Intermittent every 8 hours  tamsulosin Oral Tab/Cap - Peds 0.4 milliGRAM(s) Oral at bedtime    PRN MEDICATIONS  dextrose 40% Gel 15 Gram(s) Oral once PRN  glucagon  Injectable 1 milliGRAM(s) IntraMuscular once PRN      VITAL SIGNS: Last 24 Hours  T(C): 36.2 (2019 07:59), Max: 36.7 (31 Oct 2019 16:30)  T(F): 97.1 (2019 07:59), Max: 98.1 (31 Oct 2019 16:30)  HR: 72 (2019 07:59) (69 - 87)  BP: 164/77 (2019 07:59) (119/58 - 164/77)  BP(mean): --  RR: 18 (2019 07:59) (18 - 20)  SpO2: 98% (2019 07:59) (97% - 100%)    LABS:                        12.9   8.28  )-----------( 240      ( 2019 08:18 )             38.7     11    138  |  98  |  18  ----------------------------<  136<H>  4.3   |  26  |  0.8    Ca    9.7      2019 08:18  Mg     1.8             Urinalysis Basic - ( 31 Oct 2019 17:11 )    Color: Light Yellow / Appearance: Clear / S.025 / pH: x  Gluc: x / Ketone: Negative  / Bili: Negative / Urobili: <2 mg/dL   Blood: x / Protein: Trace / Nitrite: Negative   Leuk Esterase: Large / RBC: 2 /HPF /  /HPF   Sq Epi: x / Non Sq Epi: 0 /HPF / Bacteria: Moderate                RADIOLOGY:'     US KIDNEYS AND BLADDER            PROCEDURE DATE:  10/31/2019      IMPRESSION:    No hydronephrosis    Prostatomegaly with approximate prostate volume of 144 mL.         PHYSICAL EXAM:    GENERAL: NAD, well-developed, AAOx3  HEENT:  Atraumatic, Normocephalic. EOMI, PERRLA, conjunctiva and sclera clear, No JVD  PULMONARY: Clear to auscultation bilaterally; No wheeze  CARDIOVASCULAR: Regular rate and rhythm; No murmurs, rubs, or gallops  GASTROINTESTINAL: Soft, Nontender, Nondistended; Bowel sounds present  MUSCULOSKELETAL:  2+ Peripheral Pulses, No clubbing, cyanosis, or edema  NEUROLOGY: non-focal  SKIN: No rashes or lesions    ASSESSMENT & PLAN  58 yr old M with PMHx of prostatitis 1 month ago, HTN, DM-2 , BPH admitted for complicated UTI    #) Recurrent complicated UTI, Prostatitis vs. cystitis, bladder pathology  - Was on Bactrim at home for a total of 10 days, recurrence 1 month after the previous episode. Sensitivity for bactrim was <32 ( E coli ESBL).  - UA positive for large leuk est and WBC  - Meropenem 1g IV q8h ; F/up urine culture  - F/up ID consult   - F/up urology consult ( Dr. ramirez/kyra), will likely need cystoscopy to evaluate bladder   - No need for bagley now, patient passing urine  - Trend CBC qd and maintain active T&S    #) DMII  - Last A1C was 8 ( in 2019)  - insulin lispro 10 U qac and lantus 30 U qhs  - Monitor FS qac/hs and adjust accordingly    #) HTN  -BP x 24 hours: BP:  (119/58 - 164/77)  - C/w enalapril 10 mg q12h Po    #) BPH  - C/w flomax 0.4 mg qd    Diet: Carb consistent DASH    DVT ppx: lovenox 40 mg qd    GI ppx: none    Activity: ambulate as tolerated    Dispo: From home    Code status: FULL

## 2019-11-02 LAB
ANION GAP SERPL CALC-SCNC: 10 MMOL/L — SIGNIFICANT CHANGE UP (ref 7–14)
BASOPHILS # BLD AUTO: 0.03 K/UL — SIGNIFICANT CHANGE UP (ref 0–0.2)
BASOPHILS NFR BLD AUTO: 0.4 % — SIGNIFICANT CHANGE UP (ref 0–1)
BUN SERPL-MCNC: 18 MG/DL — SIGNIFICANT CHANGE UP (ref 10–20)
CALCIUM SERPL-MCNC: 9.1 MG/DL — SIGNIFICANT CHANGE UP (ref 8.5–10.1)
CHLORIDE SERPL-SCNC: 103 MMOL/L — SIGNIFICANT CHANGE UP (ref 98–110)
CO2 SERPL-SCNC: 27 MMOL/L — SIGNIFICANT CHANGE UP (ref 17–32)
CREAT SERPL-MCNC: 0.7 MG/DL — SIGNIFICANT CHANGE UP (ref 0.7–1.5)
EOSINOPHIL # BLD AUTO: 0.16 K/UL — SIGNIFICANT CHANGE UP (ref 0–0.7)
EOSINOPHIL NFR BLD AUTO: 2.1 % — SIGNIFICANT CHANGE UP (ref 0–8)
GLUCOSE BLDC GLUCOMTR-MCNC: 138 MG/DL — HIGH (ref 70–99)
GLUCOSE BLDC GLUCOMTR-MCNC: 143 MG/DL — HIGH (ref 70–99)
GLUCOSE BLDC GLUCOMTR-MCNC: 166 MG/DL — HIGH (ref 70–99)
GLUCOSE BLDC GLUCOMTR-MCNC: 175 MG/DL — HIGH (ref 70–99)
GLUCOSE SERPL-MCNC: 160 MG/DL — HIGH (ref 70–99)
HCT VFR BLD CALC: 37.5 % — LOW (ref 42–52)
HGB BLD-MCNC: 12.5 G/DL — LOW (ref 14–18)
IMM GRANULOCYTES NFR BLD AUTO: 0.3 % — SIGNIFICANT CHANGE UP (ref 0.1–0.3)
LYMPHOCYTES # BLD AUTO: 2.06 K/UL — SIGNIFICANT CHANGE UP (ref 1.2–3.4)
LYMPHOCYTES # BLD AUTO: 27 % — SIGNIFICANT CHANGE UP (ref 20.5–51.1)
MCHC RBC-ENTMCNC: 30.2 PG — SIGNIFICANT CHANGE UP (ref 27–31)
MCHC RBC-ENTMCNC: 33.3 G/DL — SIGNIFICANT CHANGE UP (ref 32–37)
MCV RBC AUTO: 90.6 FL — SIGNIFICANT CHANGE UP (ref 80–94)
MONOCYTES # BLD AUTO: 0.56 K/UL — SIGNIFICANT CHANGE UP (ref 0.1–0.6)
MONOCYTES NFR BLD AUTO: 7.3 % — SIGNIFICANT CHANGE UP (ref 1.7–9.3)
NEUTROPHILS # BLD AUTO: 4.81 K/UL — SIGNIFICANT CHANGE UP (ref 1.4–6.5)
NEUTROPHILS NFR BLD AUTO: 62.9 % — SIGNIFICANT CHANGE UP (ref 42.2–75.2)
NRBC # BLD: 0 /100 WBCS — SIGNIFICANT CHANGE UP (ref 0–0)
PLATELET # BLD AUTO: 226 K/UL — SIGNIFICANT CHANGE UP (ref 130–400)
POTASSIUM SERPL-MCNC: 4.6 MMOL/L — SIGNIFICANT CHANGE UP (ref 3.5–5)
POTASSIUM SERPL-SCNC: 4.6 MMOL/L — SIGNIFICANT CHANGE UP (ref 3.5–5)
PSA FREE FLD-MCNC: 0.59 NG/ML — SIGNIFICANT CHANGE UP
PSA FREE FLD-MCNC: 8 % — SIGNIFICANT CHANGE UP
PSA SERPL-MCNC: 7.68 NG/ML — HIGH (ref 0–4)
RBC # BLD: 4.14 M/UL — LOW (ref 4.7–6.1)
RBC # FLD: 13.2 % — SIGNIFICANT CHANGE UP (ref 11.5–14.5)
SODIUM SERPL-SCNC: 140 MMOL/L — SIGNIFICANT CHANGE UP (ref 135–146)
WBC # BLD: 7.64 K/UL — SIGNIFICANT CHANGE UP (ref 4.8–10.8)
WBC # FLD AUTO: 7.64 K/UL — SIGNIFICANT CHANGE UP (ref 4.8–10.8)

## 2019-11-02 PROCEDURE — 99233 SBSQ HOSP IP/OBS HIGH 50: CPT

## 2019-11-02 RX ORDER — ALPRAZOLAM 0.25 MG
0.5 TABLET ORAL ONCE
Refills: 0 | Status: DISCONTINUED | OUTPATIENT
Start: 2019-11-02 | End: 2019-11-02

## 2019-11-02 RX ADMIN — MEROPENEM 100 MILLIGRAM(S): 1 INJECTION INTRAVENOUS at 06:00

## 2019-11-02 RX ADMIN — Medication 10 MILLIGRAM(S): at 17:32

## 2019-11-02 RX ADMIN — INSULIN GLARGINE 30 UNIT(S): 100 INJECTION, SOLUTION SUBCUTANEOUS at 21:35

## 2019-11-02 RX ADMIN — MEROPENEM 100 MILLIGRAM(S): 1 INJECTION INTRAVENOUS at 21:00

## 2019-11-02 RX ADMIN — Medication 10 UNIT(S): at 17:33

## 2019-11-02 RX ADMIN — MEROPENEM 100 MILLIGRAM(S): 1 INJECTION INTRAVENOUS at 16:11

## 2019-11-02 RX ADMIN — Medication 10 MILLIGRAM(S): at 06:00

## 2019-11-02 RX ADMIN — TAMSULOSIN HYDROCHLORIDE 0.4 MILLIGRAM(S): 0.4 CAPSULE ORAL at 21:01

## 2019-11-02 RX ADMIN — Medication 10 UNIT(S): at 08:40

## 2019-11-02 RX ADMIN — Medication 10 UNIT(S): at 12:03

## 2019-11-02 RX ADMIN — Medication 1: at 12:04

## 2019-11-02 RX ADMIN — Medication 0.5 MILLIGRAM(S): at 20:54

## 2019-11-02 NOTE — PROGRESS NOTE ADULT - SUBJECTIVE AND OBJECTIVE BOX
ARTHUR DE LEÓN  [facility] T2-3A 023 A ([facility] T2-3A)            Patient was evaluated and examined  by bedside, no active complains                 REVIEW OF SYSTEMS:  please see pertinent positives mentioned above, all other 12 ROS negative      T(C): , Max: 37.1 (11-01-19 @ 16:07)  HR: 59 (11-02-19 @ 12:33)  BP: 125/72 (11-02-19 @ 12:33)  RR: 18 (11-02-19 @ 12:33)  SpO2: 99% (11-01-19 @ 16:07)  CAPILLARY BLOOD GLUCOSE      POCT Blood Glucose.: 175 mg/dL (02 Nov 2019 11:41)  POCT Blood Glucose.: 143 mg/dL (02 Nov 2019 08:04)  POCT Blood Glucose.: 214 mg/dL (01 Nov 2019 21:58)  POCT Blood Glucose.: 192 mg/dL (01 Nov 2019 17:11)      PHYSICAL EXAM:  General: NAD, AAOX3, patient is laying comfortably in bed  HEENT: AT, NC, Supple, NO JVD, NO CB  Lungs: CTA B/L, no wheezing, no rhonchi  CVS: normal S1, S2, RRR, NO M/G/R  Abdomen: soft, bowel sounds present, non-tender, non-distended  Extremities: no edema, no clubbing, no cyanosis, positive peripheral pulses b/l  Neuro: no acute focal neurological deficits  Skin: no rash, no ecchymosis      LAB  CBC  Date: 11-02-19 @ 08:08  Mean cell Btnbkvxwaj30.2  Mean cell Hemoglobin Conc33.3  Mean cell Volum 90.6  Platelet count-Automate 226  RBC Count 4.14  Red Cell Distrib Width13.2  WBC Count7.64  % Albumin, Urine--  Hematocrit 37.5  Hemoglobin 12.5  CBC  Date: 11-01-19 @ 08:18  Mean cell Srmcmyyqwm42.1  Mean cell Hemoglobin Conc33.3  Mean cell Volum 90.4  Platelet count-Automate 240  RBC Count 4.28  Red Cell Distrib Width13.3  WBC Count8.28  % Albumin, Urine--  Hematocrit 38.7  Hemoglobin 12.9  CBC  Date: 10-31-19 @ 17:10  Mean cell Cvnxtmfzck09.9  Mean cell Hemoglobin Conc34.0  Mean cell Volum 91.0  Platelet count-Automate 236  RBC Count 3.98  Red Cell Distrib Width13.2  WBC Count7.17  % Albumin, Urine--  Hematocrit 36.2  Hemoglobin 12.3    BMP  11-02-19 @ 08:08  Blood Gas Arterial-Calcium,Ionized--  Blood Urea Nitrogen, Serum 18 mg/dL [10 - 20]  Carbon Dioxide, Serum27 mmol/L [17 - 32]  Chloride, [id number] mmol/L [98 - 110]  Creatinie, Serum0.7 mg/dL [0.7 - 1.5]  Glucose, [id number] mg/dL<H> [70 - 99]  Potassium, Serum4.6 mmol/L [3.5 - 5.0]  Sodium, Serum 140 mmol/L [135 - 146]  [facility]  11-01-19 @ 08:18  Blood Gas Arterial-Calcium,Ionized--  Blood Urea Nitrogen, Serum 18 mg/dL [10 - 20]  Carbon Dioxide, Serum26 mmol/L [17 - 32]  Chloride, Serum98 mmol/L [98 - 110]  Creatinie, Serum0.8 mg/dL [0.7 - 1.5]  Glucose, [id number] mg/dL<H> [70 - 99]  Potassium, Serum4.3 mmol/L [3.5 - 5.0]  Sodium, Serum 138 mmol/L [135 - 146]  [facility]  10-31-19 @ 17:10  Blood Gas Arterial-Calcium,Ionized--  Blood Urea Nitrogen, Serum 16 mg/dL [10 - 20]  Carbon Dioxide, Serum23 mmol/L [17 - 32]  Chloride, Serum98 mmol/L [98 - 110]  Creatinie, Serum1.1 mg/dL [0.7 - 1.5]  Glucose, [id number] mg/dL<H> [70 - 99]  Potassium, Serum4.4 mmol/L [3.5 - 5.0]  Sodium, Serum 135 mmol/L [135 - 146]              Microbiology:    Culture - Urine (collected 10-31-19 @ 17:11)  Source: .Urine Clean Catch (Midstream)  Preliminary Report (11-02-19 @ 06:14):    >100,000 CFU/ml Gram Negative Rods        Medications:  dextrose 40% Gel 15 Gram(s) Oral once PRN  dextrose 5%. 1000 milliLiter(s) IV Continuous <Continuous>  dextrose 50% Injectable 12.5 Gram(s) IV Push once  dextrose 50% Injectable 25 Gram(s) IV Push once  dextrose 50% Injectable 25 Gram(s) IV Push once  enalapril 10 milliGRAM(s) Oral two times a day  enoxaparin Injectable 40 milliGRAM(s) SubCutaneous daily  glucagon  Injectable 1 milliGRAM(s) IntraMuscular once PRN  influenza   Vaccine 0.5 milliLiter(s) IntraMuscular once  insulin glargine Injectable (LANTUS) 30 Unit(s) SubCutaneous at bedtime  insulin lispro (HumaLOG) corrective regimen sliding scale   SubCutaneous three times a day before meals  insulin lispro Injectable (HumaLOG) 10 Unit(s) SubCutaneous three times a day before meals  meropenem  IVPB 1000 milliGRAM(s) IV Intermittent every 8 hours  tamsulosin Oral Tab/Cap - Peds 0.4 milliGRAM(s) Oral at bedtime        Assessment and Plan:  58 yr old M with PMHx of prostatitis 1 month ago, HTN, DM-2 , BPH admitted for complicated UTI    #) Recurrent complicated UTI, Prostatitis vs. cystitis, bladder pathology  - Was on Bactrim at home for a total of 10 days, recurrence 1 month after the previous episode. Sensitivity for bactrim was <32 ( E coli ESBL).  - UA positive for large leuk est and WBC  - Meropenem 1g IV q8h ; F/up urine culture      #) DMII  - Last A1C was 8 ( in september 2019)  - insulin lispro 10 U qac and lantus 30 U qhs  - Monitor FS qac/hs and adjust accordingly    #) HTN  -BP x 24 hours: BP:  (119/58 - 164/77)  - C/w enalapril tx    #) BPH  - C/w flomax 0.4 mg qd    Diet: Carb consistent DASH    DVT ppx: lovenox 40 mg qd    GI ppx: none    Activity: ambulate as tolerated    Dispo: From home    Code status: FULL    #Progress Note Handoff: continue IV abx tx., f/up urine cxs  Family discussion: pt. by bedside Disposition: Home__once we have urine cxs results to make decision on abx. tx.

## 2019-11-03 ENCOUNTER — TRANSCRIPTION ENCOUNTER (OUTPATIENT)
Age: 59
End: 2019-11-03

## 2019-11-03 LAB
-  AMIKACIN: SIGNIFICANT CHANGE UP
-  AMPICILLIN/SULBACTAM: SIGNIFICANT CHANGE UP
-  AMPICILLIN: SIGNIFICANT CHANGE UP
-  AZTREONAM: SIGNIFICANT CHANGE UP
-  CEFAZOLIN: SIGNIFICANT CHANGE UP
-  CEFEPIME: SIGNIFICANT CHANGE UP
-  CEFOXITIN: SIGNIFICANT CHANGE UP
-  CEFTRIAXONE: SIGNIFICANT CHANGE UP
-  CIPROFLOXACIN: SIGNIFICANT CHANGE UP
-  ERTAPENEM: SIGNIFICANT CHANGE UP
-  GENTAMICIN: SIGNIFICANT CHANGE UP
-  IMIPENEM: SIGNIFICANT CHANGE UP
-  LEVOFLOXACIN: SIGNIFICANT CHANGE UP
-  MEROPENEM: SIGNIFICANT CHANGE UP
-  NITROFURANTOIN: SIGNIFICANT CHANGE UP
-  PIPERACILLIN/TAZOBACTAM: SIGNIFICANT CHANGE UP
-  TIGECYCLINE: SIGNIFICANT CHANGE UP
-  TOBRAMYCIN: SIGNIFICANT CHANGE UP
-  TRIMETHOPRIM/SULFAMETHOXAZOLE: SIGNIFICANT CHANGE UP
BASOPHILS # BLD AUTO: 0.03 K/UL — SIGNIFICANT CHANGE UP (ref 0–0.2)
BASOPHILS NFR BLD AUTO: 0.4 % — SIGNIFICANT CHANGE UP (ref 0–1)
CULTURE RESULTS: SIGNIFICANT CHANGE UP
EOSINOPHIL # BLD AUTO: 0.15 K/UL — SIGNIFICANT CHANGE UP (ref 0–0.7)
EOSINOPHIL NFR BLD AUTO: 1.8 % — SIGNIFICANT CHANGE UP (ref 0–8)
GLUCOSE BLDC GLUCOMTR-MCNC: 119 MG/DL — HIGH (ref 70–99)
GLUCOSE BLDC GLUCOMTR-MCNC: 148 MG/DL — HIGH (ref 70–99)
GLUCOSE BLDC GLUCOMTR-MCNC: 183 MG/DL — HIGH (ref 70–99)
GLUCOSE BLDC GLUCOMTR-MCNC: 198 MG/DL — HIGH (ref 70–99)
GLUCOSE BLDC GLUCOMTR-MCNC: 257 MG/DL — HIGH (ref 70–99)
HCT VFR BLD CALC: 37.8 % — LOW (ref 42–52)
HGB BLD-MCNC: 12.7 G/DL — LOW (ref 14–18)
IMM GRANULOCYTES NFR BLD AUTO: 0.5 % — HIGH (ref 0.1–0.3)
LYMPHOCYTES # BLD AUTO: 1.97 K/UL — SIGNIFICANT CHANGE UP (ref 1.2–3.4)
LYMPHOCYTES # BLD AUTO: 23.3 % — SIGNIFICANT CHANGE UP (ref 20.5–51.1)
MCHC RBC-ENTMCNC: 30.5 PG — SIGNIFICANT CHANGE UP (ref 27–31)
MCHC RBC-ENTMCNC: 33.6 G/DL — SIGNIFICANT CHANGE UP (ref 32–37)
MCV RBC AUTO: 90.6 FL — SIGNIFICANT CHANGE UP (ref 80–94)
METHOD TYPE: SIGNIFICANT CHANGE UP
MONOCYTES # BLD AUTO: 0.48 K/UL — SIGNIFICANT CHANGE UP (ref 0.1–0.6)
MONOCYTES NFR BLD AUTO: 5.7 % — SIGNIFICANT CHANGE UP (ref 1.7–9.3)
NEUTROPHILS # BLD AUTO: 5.8 K/UL — SIGNIFICANT CHANGE UP (ref 1.4–6.5)
NEUTROPHILS NFR BLD AUTO: 68.3 % — SIGNIFICANT CHANGE UP (ref 42.2–75.2)
NRBC # BLD: 0 /100 WBCS — SIGNIFICANT CHANGE UP (ref 0–0)
ORGANISM # SPEC MICROSCOPIC CNT: SIGNIFICANT CHANGE UP
ORGANISM # SPEC MICROSCOPIC CNT: SIGNIFICANT CHANGE UP
PLATELET # BLD AUTO: 247 K/UL — SIGNIFICANT CHANGE UP (ref 130–400)
RBC # BLD: 4.17 M/UL — LOW (ref 4.7–6.1)
RBC # FLD: 13.1 % — SIGNIFICANT CHANGE UP (ref 11.5–14.5)
SPECIMEN SOURCE: SIGNIFICANT CHANGE UP
WBC # BLD: 8.47 K/UL — SIGNIFICANT CHANGE UP (ref 4.8–10.8)
WBC # FLD AUTO: 8.47 K/UL — SIGNIFICANT CHANGE UP (ref 4.8–10.8)

## 2019-11-03 PROCEDURE — 99233 SBSQ HOSP IP/OBS HIGH 50: CPT

## 2019-11-03 RX ORDER — ALPRAZOLAM 0.25 MG
0.5 TABLET ORAL ONCE
Refills: 0 | Status: DISCONTINUED | OUTPATIENT
Start: 2019-11-03 | End: 2019-11-03

## 2019-11-03 RX ADMIN — INSULIN GLARGINE 30 UNIT(S): 100 INJECTION, SOLUTION SUBCUTANEOUS at 22:14

## 2019-11-03 RX ADMIN — Medication 10 UNIT(S): at 17:58

## 2019-11-03 RX ADMIN — MEROPENEM 100 MILLIGRAM(S): 1 INJECTION INTRAVENOUS at 15:41

## 2019-11-03 RX ADMIN — Medication 3: at 17:58

## 2019-11-03 RX ADMIN — MEROPENEM 100 MILLIGRAM(S): 1 INJECTION INTRAVENOUS at 21:15

## 2019-11-03 RX ADMIN — Medication 0.5 MILLIGRAM(S): at 21:15

## 2019-11-03 RX ADMIN — TAMSULOSIN HYDROCHLORIDE 0.4 MILLIGRAM(S): 0.4 CAPSULE ORAL at 21:15

## 2019-11-03 RX ADMIN — Medication 10 UNIT(S): at 08:28

## 2019-11-03 RX ADMIN — Medication 10 MILLIGRAM(S): at 17:59

## 2019-11-03 RX ADMIN — MEROPENEM 100 MILLIGRAM(S): 1 INJECTION INTRAVENOUS at 06:09

## 2019-11-03 RX ADMIN — Medication 10 MILLIGRAM(S): at 06:09

## 2019-11-03 RX ADMIN — Medication 10 UNIT(S): at 12:19

## 2019-11-03 NOTE — PROGRESS NOTE ADULT - SUBJECTIVE AND OBJECTIVE BOX
ARTHUR DE LEÓN  [facility] T2-3A 023 A ([facility] T[facility])            Patient was evaluated and examined  by bedside, no active complains                REVIEW OF SYSTEMS:  please see pertinent positives mentioned above, all other 12 ROS negative      T(C): , Max: 36.3 (11-02-19 @ 12:33)  HR: 59 (11-03-19 @ 05:21)  BP: 131/69 (11-03-19 @ 05:21)  RR: 18 (11-03-19 @ 05:21)  SpO2: --  CAPILLARY BLOOD GLUCOSE      POCT Blood Glucose.: 119 mg/dL (03 Nov 2019 07:56)  POCT Blood Glucose.: 166 mg/dL (02 Nov 2019 21:28)  POCT Blood Glucose.: 138 mg/dL (02 Nov 2019 16:41)  POCT Blood Glucose.: 175 mg/dL (02 Nov 2019 11:41)      PHYSICAL EXAM:  General: NAD, AAOX3, patient is laying comfortably in bed  HEENT: AT, NC, Supple, NO JVD, NO CB  Lungs: CTA B/L, no wheezing, no rhonchi  CVS: normal S1, S2, RRR, NO M/G/R  Abdomen: soft, bowel sounds present, non-tender, non-distended  Extremities: no edema, no clubbing, no cyanosis, positive peripheral pulses b/l  Neuro: no acute focal neurological deficits  Skin: no rash, no ecchymosis      LAB  CBC  Date: 11-02-19 @ 08:08  Mean cell Lpnpkozlwv50.2  Mean cell Hemoglobin Conc33.3  Mean cell Volum 90.6  Platelet count-Automate 226  RBC Count 4.14  Red Cell Distrib Width13.2  WBC Count7.64  % Albumin, Urine--  Hematocrit 37.5  Hemoglobin 12.5  CBC  Date: 11-01-19 @ 08:18  Mean cell Wvxuhpxsou98.1  Mean cell Hemoglobin Conc33.3  Mean cell Volum 90.4  Platelet count-Automate 240  RBC Count 4.28  Red Cell Distrib Width13.3  WBC Count8.28  % Albumin, Urine--  Hematocrit 38.7  Hemoglobin 12.9  CBC  Date: 10-31-19 @ 17:10  Mean cell Ftvrerkthj65.9  Mean cell Hemoglobin Conc34.0  Mean cell Volum 91.0  Platelet count-Automate 236  RBC Count 3.98  Red Cell Distrib Width13.2  WBC Count7.17  % Albumin, Urine--  Hematocrit 36.2  Hemoglobin 12.3    BMP  11-02-19 @ 08:08  Blood Gas Arterial-Calcium,Ionized--  Blood Urea Nitrogen, Serum 18 mg/dL [10 - 20]  Carbon Dioxide, Serum27 mmol/L [17 - 32]  Chloride, Nyesv071 mmol/L [98 - 110]  Creatinie, Serum0.7 mg/dL [0.7 - 1.5]  Glucose, Ggzpn372 mg/dL<H> [70 - 99]  Potassium, Serum4.6 mmol/L [3.5 - 5.0]  Sodium, Serum 140 mmol/L [135 - 146]  BMP  11-01-19 @ 08:18  Blood Gas Arterial-Calcium,Ionized--  Blood Urea Nitrogen, Serum 18 mg/dL [10 - 20]  Carbon Dioxide, Serum26 mmol/L [17 - 32]  Chloride, Serum98 mmol/L [98 - 110]  Creatinie, Serum0.8 mg/dL [0.7 - 1.5]  Glucose, Wltls233 mg/dL<H> [70 - 99]  Potassium, Serum4.3 mmol/L [3.5 - 5.0]  Sodium, Serum 138 mmol/L [135 - 146]  [facility]  10-31-19 @ 17:10  Blood Gas Arterial-Calcium,Ionized--  Blood Urea Nitrogen, Serum 16 mg/dL [10 - 20]  Carbon Dioxide, Serum23 mmol/L [17 - 32]  Chloride, Serum98 mmol/L [98 - 110]  Creatinie, Serum1.1 mg/dL [0.7 - 1.5]  Glucose, Giwbw853 mg/dL<H> [70 - 99]  Potassium, Serum4.4 mmol/L [3.5 - 5.0]  Sodium, Serum 135 mmol/L [135 - 146]              Microbiology:    Culture - Urine (collected 10-31-19 @ 17:11)  Source: .Urine Clean Catch (Midstream)  Final Report (11-03-19 @ 08:27):    >100,000 CFU/ml Escherichia coli ESBL  Organism: Escherichia coli ESBL (11-03-19 @ 08:27)  Organism: Escherichia coli ESBL (11-03-19 @ 08:27)      -  Amikacin: S <=16      -  Ampicillin: R >16 These ampicillin results predict results for amoxicillin      -  Ampicillin/Sulbactam: R 16/8 Enterobacter, Citrobacter, and Serratia may develop resistance during prolonged therapy (3-4 days)      -  Aztreonam: R >16      -  Cefazolin: R >16 (MIC_CL_COM_ENTERIC_CEFAZU) For uncomplicated UTI with K. pneumoniae, E. coli, or P. mirablis: VIVI <=16 is sensitive and VIVI >=32 is resistant. This also predicts results for oral agents cefaclor, cefdinir, cefpodoxime, cefprozil, cefuroxime axetil, cephalexin and locarbef for uncomplicated UTI. Note that some isolates may be susceptible to these agents while testing resistant to cefazolin.      -  Cefepime: R 16      -  Cefoxitin: S <=8      -  Ceftriaxone: R >32 Enterobacter, Citrobacter, and Serratia may develop resistance during prolonged therapy      -  Ciprofloxacin: R >2      -  Ertapenem: S <=1      -  Gentamicin: R >8      -  Imipenem: S <=1      -  Levofloxacin: R >4      -  Meropenem: S <=1      -  Nitrofurantoin: I 64 Should not be used to treat pyelonephritis      -  Piperacillin/Tazobactam: R <=16      -  Tigecycline: S <=2      -  Tobramycin: R >8      -  Trimethoprim/Sulfamethoxazole: R >2/38      Method Type: VIVI          Medications:  dextrose 40% Gel 15 Gram(s) Oral once PRN  dextrose 5%. 1000 milliLiter(s) IV Continuous <Continuous>  dextrose 50% Injectable 12.5 Gram(s) IV Push once  dextrose 50% Injectable 25 Gram(s) IV Push once  dextrose 50% Injectable 25 Gram(s) IV Push once  enalapril 10 milliGRAM(s) Oral two times a day  enoxaparin Injectable 40 milliGRAM(s) SubCutaneous daily  glucagon  Injectable 1 milliGRAM(s) IntraMuscular once PRN  influenza   Vaccine 0.5 milliLiter(s) IntraMuscular once  insulin glargine Injectable (LANTUS) 30 Unit(s) SubCutaneous at bedtime  insulin lispro (HumaLOG) corrective regimen sliding scale   SubCutaneous three times a day before meals  insulin lispro Injectable (HumaLOG) 10 Unit(s) SubCutaneous three times a day before meals  meropenem  IVPB 1000 milliGRAM(s) IV Intermittent every 8 hours  tamsulosin Oral Tab/Cap - Peds 0.4 milliGRAM(s) Oral at bedtime        Assessment and Plan:  58 yr old M with PMHx of prostatitis 1 month ago, HTN, DM-2 , BPH admitted for complicated UTI    #) Recurrent complicated UTI, Prostatitis vs. cystitis, bladder pathology  - Was on Bactrim at home for a total of 10 days, recurrence 1 month after the previous episode. Sensitivity for bactrim was <32 ( E coli ESBL).  - UA positive for large leuk est and WBC  - Meropenem 1g IV q8h ;  - urine culture- ESBL EColi- sensitive to Meropenem  -patient will need Mid line for IV Ertapenem tx. upon dc- 2 weeks      #) DMII  - Last A1C was 8 ( in september 2019)  - insulin lispro 10 U qac and lantus 30 U qhs  - Monitor FS qac/hs and adjust accordingly    #) HTN  -BP x 24 hours: BP:  (119/58 - 164/77)  - C/w enalapril tx    #) BPH  - C/w flomax 0.4 mg qd    Diet: Carb consistent DASH    DVT ppx: lovenox 40 mg qd    #Progress Note Handoff: Patient needs midline placement and IV Ertapenem tx. for 2 weeks  Family discussion: pt. by bedside Disposition: Home_once IV abx. arrangement completed

## 2019-11-03 NOTE — PROGRESS NOTE ADULT - ASSESSMENT
58 yr old M with PMHx of prostatitis 1 month ago, HTN, DM-2 , BPH admitted for complicated UTI    #) Recurrent complicated UTI, Prostatitis vs. cystitis, bladder pathology  - Was on Bactrim at home for a total of 10 days, recurrence 1 month after the previous episode. Sensitivity for bactrim was <32 ( E coli ESBL).  - UA positive for large leuk est and WBC  - Meropenem 1g IV q8h  - UCx positive for ESBL EColi  - Patient ready for d/c with 14 days total IV Abx Rx via Midline (ends 11/13/19)  - Will be switched to Ertapenem on d/c  - Needs midline to be placed      #) DMII  - Last A1C was 8 ( in september 2019)  - insulin lispro 10 U qac and lantus 30 U qhs  - Monitor FS qac/hs and adjust accordingly    #) HTN  - C/w enalapril 10 mg q12h Po    #) BPH  - C/w flomax 0.4 mg qd  - Start finasteride on d/c as per urology  - op f/u with Dr. Fraser     Diet: Carb consistent DASH    DVT ppx: lovenox 40 mg qd    GI ppx: none    Activity: ambulate as tolerated    Dispo: From home    Code status: FULL

## 2019-11-03 NOTE — PROGRESS NOTE ADULT - SUBJECTIVE AND OBJECTIVE BOX
SUBJECTIVE:    Patient is a 58y old Male who presents with a chief complaint of Hematuria and dysuria (03 Nov 2019 10:59)    Currently admitted to medicine with the primary diagnosis of Hematuria     Today is hospital day 3d. This morning he is resting comfortably in bed and reports no new issues or overnight events.     PAST MEDICAL & SURGICAL HISTORY  Prostatitis  BPH with urinary obstruction  Diabetes  HTN (hypertension)  No significant past surgical history    SOCIAL HISTORY:  Negative for smoking/alcohol/drug use.     ALLERGIES:  No Known Allergies    MEDICATIONS:  STANDING MEDICATIONS  dextrose 5%. 1000 milliLiter(s) IV Continuous <Continuous>  dextrose 50% Injectable 12.5 Gram(s) IV Push once  dextrose 50% Injectable 25 Gram(s) IV Push once  dextrose 50% Injectable 25 Gram(s) IV Push once  enalapril 10 milliGRAM(s) Oral two times a day  enoxaparin Injectable 40 milliGRAM(s) SubCutaneous daily  influenza   Vaccine 0.5 milliLiter(s) IntraMuscular once  insulin glargine Injectable (LANTUS) 30 Unit(s) SubCutaneous at bedtime  insulin lispro (HumaLOG) corrective regimen sliding scale   SubCutaneous three times a day before meals  insulin lispro Injectable (HumaLOG) 10 Unit(s) SubCutaneous three times a day before meals  meropenem  IVPB 1000 milliGRAM(s) IV Intermittent every 8 hours  tamsulosin Oral Tab/Cap - Peds 0.4 milliGRAM(s) Oral at bedtime    PRN MEDICATIONS  dextrose 40% Gel 15 Gram(s) Oral once PRN  glucagon  Injectable 1 milliGRAM(s) IntraMuscular once PRN    VITALS:   T(F): 97.6  HR: 85  BP: 138/79  RR: 18  SpO2: --    LABS:                        12.7   8.47  )-----------( 247      ( 03 Nov 2019 13:45 )             37.8     11-02    140  |  103  |  18  ----------------------------<  160<H>  4.6   |  27  |  0.7    Ca    9.1      02 Nov 2019 08:08                    RADIOLOGY:    PHYSICAL EXAM:  GEN: No acute distress  LUNGS: Clear to auscultation bilaterally   HEART: Regular  ABD: Soft, non-tender, non-distended.  EXT: NC/NC/NE/2+PP/OSEI/Skin Intact.   NEURO: AAOX3

## 2019-11-03 NOTE — DISCHARGE NOTE NURSING/CASE MANAGEMENT/SOCIAL WORK - PATIENT PORTAL LINK FT
You can access the FollowMyHealth Patient Portal offered by [facility] by registering at the following website: [email or web address]. By joining [facility]’s FollowMyHealth portal, you will also be able to view your health information using other applications (apps) compatible with our system.

## 2019-11-04 LAB
ALBUMIN SERPL ELPH-MCNC: 4.2 G/DL — SIGNIFICANT CHANGE UP (ref 3.5–5.2)
ALP SERPL-CCNC: 137 U/L — HIGH (ref 30–115)
ALT FLD-CCNC: 19 U/L — SIGNIFICANT CHANGE UP (ref 0–41)
ANION GAP SERPL CALC-SCNC: 9 MMOL/L — SIGNIFICANT CHANGE UP (ref 7–14)
AST SERPL-CCNC: 20 U/L — SIGNIFICANT CHANGE UP (ref 0–41)
BASOPHILS # BLD AUTO: 0.03 K/UL — SIGNIFICANT CHANGE UP (ref 0–0.2)
BASOPHILS NFR BLD AUTO: 0.4 % — SIGNIFICANT CHANGE UP (ref 0–1)
BILIRUB SERPL-MCNC: 0.5 MG/DL — SIGNIFICANT CHANGE UP (ref 0.2–1.2)
BUN SERPL-MCNC: 18 MG/DL — SIGNIFICANT CHANGE UP (ref 10–20)
CALCIUM SERPL-MCNC: 9.4 MG/DL — SIGNIFICANT CHANGE UP (ref 8.5–10.1)
CHLORIDE SERPL-SCNC: 102 MMOL/L — SIGNIFICANT CHANGE UP (ref 98–110)
CO2 SERPL-SCNC: 28 MMOL/L — SIGNIFICANT CHANGE UP (ref 17–32)
CREAT SERPL-MCNC: 0.8 MG/DL — SIGNIFICANT CHANGE UP (ref 0.7–1.5)
CULTURE RESULTS: SIGNIFICANT CHANGE UP
EOSINOPHIL # BLD AUTO: 0.12 K/UL — SIGNIFICANT CHANGE UP (ref 0–0.7)
EOSINOPHIL NFR BLD AUTO: 1.6 % — SIGNIFICANT CHANGE UP (ref 0–8)
GLUCOSE BLDC GLUCOMTR-MCNC: 127 MG/DL — HIGH (ref 70–99)
GLUCOSE BLDC GLUCOMTR-MCNC: 151 MG/DL — HIGH (ref 70–99)
GLUCOSE BLDC GLUCOMTR-MCNC: 155 MG/DL — HIGH (ref 70–99)
GLUCOSE BLDC GLUCOMTR-MCNC: 171 MG/DL — HIGH (ref 70–99)
GLUCOSE SERPL-MCNC: 171 MG/DL — HIGH (ref 70–99)
HCT VFR BLD CALC: 37.2 % — LOW (ref 42–52)
HGB BLD-MCNC: 12.7 G/DL — LOW (ref 14–18)
IMM GRANULOCYTES NFR BLD AUTO: 0.3 % — SIGNIFICANT CHANGE UP (ref 0.1–0.3)
LYMPHOCYTES # BLD AUTO: 2 K/UL — SIGNIFICANT CHANGE UP (ref 1.2–3.4)
LYMPHOCYTES # BLD AUTO: 26.2 % — SIGNIFICANT CHANGE UP (ref 20.5–51.1)
MAGNESIUM SERPL-MCNC: 1.8 MG/DL — SIGNIFICANT CHANGE UP (ref 1.8–2.4)
MCHC RBC-ENTMCNC: 30.5 PG — SIGNIFICANT CHANGE UP (ref 27–31)
MCHC RBC-ENTMCNC: 34.1 G/DL — SIGNIFICANT CHANGE UP (ref 32–37)
MCV RBC AUTO: 89.4 FL — SIGNIFICANT CHANGE UP (ref 80–94)
MONOCYTES # BLD AUTO: 0.5 K/UL — SIGNIFICANT CHANGE UP (ref 0.1–0.6)
MONOCYTES NFR BLD AUTO: 6.6 % — SIGNIFICANT CHANGE UP (ref 1.7–9.3)
NEUTROPHILS # BLD AUTO: 4.95 K/UL — SIGNIFICANT CHANGE UP (ref 1.4–6.5)
NEUTROPHILS NFR BLD AUTO: 64.9 % — SIGNIFICANT CHANGE UP (ref 42.2–75.2)
NRBC # BLD: 0 /100 WBCS — SIGNIFICANT CHANGE UP (ref 0–0)
PLATELET # BLD AUTO: 226 K/UL — SIGNIFICANT CHANGE UP (ref 130–400)
POTASSIUM SERPL-MCNC: 4.7 MMOL/L — SIGNIFICANT CHANGE UP (ref 3.5–5)
POTASSIUM SERPL-SCNC: 4.7 MMOL/L — SIGNIFICANT CHANGE UP (ref 3.5–5)
PROT SERPL-MCNC: 6.9 G/DL — SIGNIFICANT CHANGE UP (ref 6–8)
RBC # BLD: 4.16 M/UL — LOW (ref 4.7–6.1)
RBC # FLD: 12.8 % — SIGNIFICANT CHANGE UP (ref 11.5–14.5)
SODIUM SERPL-SCNC: 139 MMOL/L — SIGNIFICANT CHANGE UP (ref 135–146)
WBC # BLD: 7.62 K/UL — SIGNIFICANT CHANGE UP (ref 4.8–10.8)
WBC # FLD AUTO: 7.62 K/UL — SIGNIFICANT CHANGE UP (ref 4.8–10.8)

## 2019-11-04 PROCEDURE — 99233 SBSQ HOSP IP/OBS HIGH 50: CPT

## 2019-11-04 RX ORDER — ALPRAZOLAM 0.25 MG
0.25 TABLET ORAL ONCE
Refills: 0 | Status: DISCONTINUED | OUTPATIENT
Start: 2019-11-04 | End: 2019-11-04

## 2019-11-04 RX ORDER — ERTAPENEM SODIUM 1 G/1
1000 INJECTION, POWDER, LYOPHILIZED, FOR SOLUTION INTRAMUSCULAR; INTRAVENOUS ONCE
Refills: 0 | Status: COMPLETED | OUTPATIENT
Start: 2019-11-04 | End: 2019-11-04

## 2019-11-04 RX ADMIN — Medication 10 UNIT(S): at 11:51

## 2019-11-04 RX ADMIN — ENOXAPARIN SODIUM 40 MILLIGRAM(S): 100 INJECTION SUBCUTANEOUS at 11:32

## 2019-11-04 RX ADMIN — Medication 1: at 07:43

## 2019-11-04 RX ADMIN — ERTAPENEM SODIUM 120 MILLIGRAM(S): 1 INJECTION, POWDER, LYOPHILIZED, FOR SOLUTION INTRAMUSCULAR; INTRAVENOUS at 12:36

## 2019-11-04 RX ADMIN — TAMSULOSIN HYDROCHLORIDE 0.4 MILLIGRAM(S): 0.4 CAPSULE ORAL at 21:03

## 2019-11-04 RX ADMIN — INSULIN GLARGINE 30 UNIT(S): 100 INJECTION, SOLUTION SUBCUTANEOUS at 21:38

## 2019-11-04 RX ADMIN — Medication 1: at 11:51

## 2019-11-04 RX ADMIN — Medication 10 MILLIGRAM(S): at 17:17

## 2019-11-04 RX ADMIN — Medication 10 MILLIGRAM(S): at 05:19

## 2019-11-04 RX ADMIN — Medication 10 UNIT(S): at 07:43

## 2019-11-04 RX ADMIN — Medication 0.25 MILLIGRAM(S): at 21:28

## 2019-11-04 RX ADMIN — MEROPENEM 100 MILLIGRAM(S): 1 INJECTION INTRAVENOUS at 05:19

## 2019-11-04 RX ADMIN — Medication 10 UNIT(S): at 16:50

## 2019-11-04 NOTE — PROCEDURE NOTE - NSPROCDETAILS_GEN_ALL_CORE
supine position/sterile dressing applied/sterile technique, catheter placed/location identified, draped/prepped, sterile technique used/ultrasound guidance

## 2019-11-04 NOTE — PROGRESS NOTE ADULT - ASSESSMENT
· Assessment		  58 yr old M with PMHx of prostatitis 1 month ago, HTN, DM-2 , BPH admitted for complicated UTI    #) Recurrent complicated UTI, Prostatitis vs. cystitis, bladder pathology  - Was on Bactrim at home for a total of 10 days, recurrence 1 month after the previous episode. Sensitivity for bactrim was <32 ( E coli ESBL).  - UA positive for large leuk est and WBC  - Meropenem 1g IV q8h, discontinued   - UCx positive for ESBL EColi  - Patient ready for d/c with 21 days total IV Abx Rx via Midline (ends 11/21/19)  - Midline placed   - Switched to Ertapenem 1g q 24       #) DMII  - Last A1C was 8 ( in september 2019)  - insulin lispro 10 U qac and lantus 30 U qhs  - Monitor FS qac/hs and adjust accordingly    #) HTN  - C/w enalapril 10 mg q12h Po    #) BPH  - C/w flomax 0.4 mg qd  - Start finasteride on d/c as per urology  - op f/u with Dr. Fraser     Diet: Carb consistent DASH    DVT ppx: lovenox 40 mg qd    GI ppx: none    Activity: ambulate as tolerated    Dispo: From home    Code status: FULL

## 2019-11-04 NOTE — PROGRESS NOTE ADULT - ASSESSMENT
ASSESSMENT  58 yr old M with PMHx of prostatitis 1 month ago ucx ESBL (S bactrim), HTN, DM-2 , BPH admitted for hematuria    IMPRESSION  #Hematuria/Dysuria concerning for Recurrent complicated UTI /prostatitis  - hx of prostatitis 1 month ago (ESBL e. coli) , d/c'ed on 6 weeks of bactrim, unclear if pt took the entire course  - UA positive for large leuk est and WBC  - urology following   - U/S kidney and bladder: Prostatomegaly with approximate prostate volume of 144 mL.   #Sepsis ruled out on admission     RECOMMENDATIONS  - d/c on ertapenem 1g q24h IV via midline x 21 days for prostatitis as did not complete PO bactrim course   - will ask micro to perform fosfomycin sensitivities   - Urology followup

## 2019-11-04 NOTE — PROGRESS NOTE ADULT - SUBJECTIVE AND OBJECTIVE BOX
SUBJECTIVE:    Patient is a 58y old  Male who presents with a chief complaint of Hematuria and dysuria (04 Nov 2019 13:39)    Currently admitted to medicine with the primary diagnosis of Hematuria     Today is hospital day 4d. Patient was seen and examined at bedside. This morning he is resting comfortably in bed and reports no new issues or overnight events. Patient states he is feeling good today and is ready for discharge. Patient denies chest pain, SOB, or abdominal pain.     PAST MEDICAL & SURGICAL HISTORY  PAST MEDICAL & SURGICAL HISTORY:  Prostatitis  BPH with urinary obstruction  Diabetes  HTN (hypertension)  No significant past surgical history    SOCIAL HISTORY:    ALLERGIES:  No Known Allergies    MEDICATIONS:  STANDING MEDICATIONS  dextrose 5%. 1000 milliLiter(s) IV Continuous <Continuous>  dextrose 50% Injectable 12.5 Gram(s) IV Push once  dextrose 50% Injectable 25 Gram(s) IV Push once  dextrose 50% Injectable 25 Gram(s) IV Push once  enalapril 10 milliGRAM(s) Oral two times a day  enoxaparin Injectable 40 milliGRAM(s) SubCutaneous daily  influenza   Vaccine 0.5 milliLiter(s) IntraMuscular once  insulin glargine Injectable (LANTUS) 30 Unit(s) SubCutaneous at bedtime  insulin lispro (HumaLOG) corrective regimen sliding scale   SubCutaneous three times a day before meals  insulin lispro Injectable (HumaLOG) 10 Unit(s) SubCutaneous three times a day before meals  tamsulosin Oral Tab/Cap - Peds 0.4 milliGRAM(s) Oral at bedtime    PRN MEDICATIONS  dextrose 40% Gel 15 Gram(s) Oral once PRN  glucagon  Injectable 1 milliGRAM(s) IntraMuscular once PRN    VITALS:   T(F): 96.4  HR: 68  BP: 161/77  RR: 18  SpO2: --    LABS:                        12.7   7.62  )-----------( 226      ( 04 Nov 2019 06:47 )             37.2     11-04    139  |  102  |  18  ----------------------------<  171<H>  4.7   |  28  |  0.8    Ca    9.4      04 Nov 2019 06:47  Mg     1.8     11-04    TPro  6.9  /  Alb  4.2  /  TBili  0.5  /  DBili  x   /  AST  20  /  ALT  19  /  AlkPhos  137<H>  11-04                  RADIOLOGY:  < from: US Kidney and Bladder (10.31.19 @ 17:53) >    IMPRESSION:    No hydronephrosis    Prostatomegaly with approximate prostate volume of 144 mL.     < end of copied text >    PHYSICAL EXAM:  GENERAL: NAD, speaks in full sentences, no signs of respiratory distress  HEAD: Atraumatic  NECK: Supple  CHEST/LUNG: Clear to auscultation bilaterally; No wheeze or crackles  HEART: S1, S2; RRR; No murmurs, rubs, or gallops  ABDOMEN: BS+; Soft, Non-tender, Non-distended  EXTREMITIES:  2+ Peripheral Pulses, midline placed in left brachial   PSYCH: AAOx3  SKIN: No rashes or lesions

## 2019-11-04 NOTE — PROGRESS NOTE ADULT - SUBJECTIVE AND OBJECTIVE BOX
ARTHUR DE LEÓN  58y, Male  Allergy: No Known Allergies      CHIEF COMPLAINT: Hematuria and dysuria (03 Nov 2019 18:15)      INTERVAL EVENTS/HPI  - No acute events overnight ucx   >100,000 CFU/ml Escherichia coli ESBL  - T(F): , Max: 97.6 (11-03-19 @ 14:21)  - Denies any worsening symptoms  - Tolerating medication  - WBC Count: 7.62 (11-04-19 @ 06:47)      ROS  General: Denies rigors, nightsweats  HEENT: Denies headache, rhinorrhea, sore throat, eye pain  CV: Denies CP, palpitations  PULM: Denies wheezing, hemoptysis  GI: Denies hematemesis, hematochezia, melena  : Denies discharge, hematuria  MSK: Denies arthralgias, myalgias  SKIN: Denies rash, lesions  NEURO: Denies paresthesias, weakness  PSYCH: Denies depression, anxiety    VITALS:  T(F): 97.6, Max: 97.6 (11-03-19 @ 14:21)  HR: 63  BP: 126/75  RR: 18Vital Signs Last 24 Hrs  T(C): 36.4 (04 Nov 2019 05:00), Max: 36.4 (03 Nov 2019 14:21)  T(F): 97.6 (04 Nov 2019 05:00), Max: 97.6 (03 Nov 2019 14:21)  HR: 63 (04 Nov 2019 05:00) (59 - 85)  BP: 126/75 (04 Nov 2019 05:00) (126/75 - 138/79)  BP(mean): --  RR: 18 (04 Nov 2019 05:00) (18 - 18)  SpO2: --    PHYSICAL EXAM:  Gen: NAD, resting in bed  HEENT: Normocephalic, atraumatic  Neck: supple, no lymphadenopathy  CV: Regular rate & regular rhythm  Lungs: decreased BS at bases  Abdomen: Soft, BS present  : wnl, no scrotal TTP, no discharge  Ext: Warm, well perfused  Neuro: non focal, awake  Skin: no rash, no erythema      FH: Non-contributory  Social Hx: Non-contributory    TESTS & MEASUREMENTS:                        12.7   7.62  )-----------( 226      ( 04 Nov 2019 06:47 )             37.2     11-04    139  |  102  |  18  ----------------------------<  171<H>  4.7   |  28  |  0.8    Ca    9.4      04 Nov 2019 06:47  Mg     1.8     11-04    TPro  6.9  /  Alb  4.2  /  TBili  0.5  /  DBili  x   /  AST  20  /  ALT  19  /  AlkPhos  137<H>  11-04    eGFR if Non African American: 98 mL/min/1.73M2 (11-04-19 @ 06:47)  eGFR if : 113 mL/min/1.73M2 (11-04-19 @ 06:47)    LIVER FUNCTIONS - ( 04 Nov 2019 06:47 )  Alb: 4.2 g/dL / Pro: 6.9 g/dL / ALK PHOS: 137 U/L / ALT: 19 U/L / AST: 20 U/L / GGT: x               Culture - Urine (collected 10-31-19 @ 17:11)  Source: .Urine Clean Catch (Midstream)  Final Report (11-03-19 @ 08:27):    >100,000 CFU/ml Escherichia coli ESBL  Organism: Escherichia coli ESBL (11-03-19 @ 08:27)  Organism: Escherichia coli ESBL (11-03-19 @ 08:27)      -  Amikacin: S <=16      -  Ampicillin: R >16 These ampicillin results predict results for amoxicillin      -  Ampicillin/Sulbactam: R 16/8 Enterobacter, Citrobacter, and Serratia may develop resistance during prolonged therapy (3-4 days)      -  Aztreonam: R >16      -  Cefazolin: R >16 (MIC_CL_COM_ENTERIC_CEFAZU) For uncomplicated UTI with K. pneumoniae, E. coli, or P. mirablis: VIVI <=16 is sensitive and VIVI >=32 is resistant. This also predicts results for oral agents cefaclor, cefdinir, cefpodoxime, cefprozil, cefuroxime axetil, cephalexin and locarbef for uncomplicated UTI. Note that some isolates may be susceptible to these agents while testing resistant to cefazolin.      -  Cefepime: R 16      -  Cefoxitin: S <=8      -  Ceftriaxone: R >32 Enterobacter, Citrobacter, and Serratia may develop resistance during prolonged therapy      -  Ciprofloxacin: R >2      -  Ertapenem: S <=1      -  Gentamicin: R >8      -  Imipenem: S <=1      -  Levofloxacin: R >4      -  Meropenem: S <=1      -  Nitrofurantoin: I 64 Should not be used to treat pyelonephritis      -  Piperacillin/Tazobactam: R <=16      -  Tigecycline: S <=2      -  Tobramycin: R >8      -  Trimethoprim/Sulfamethoxazole: R >2/38      Method Type: VIVI            INFECTIOUS DISEASES TESTING      RADIOLOGY & ADDITIONAL TESTS:  I have personally reviewed the last Chest xray  CXR      CT      CARDIOLOGY TESTING      MEDICATIONS  dextrose 5%. 1000  dextrose 50% Injectable 12.5  dextrose 50% Injectable 25  dextrose 50% Injectable 25  enalapril 10  enoxaparin Injectable 40  ertapenem  IVPB 1000  influenza   Vaccine 0.5  insulin glargine Injectable (LANTUS) 30  insulin lispro (HumaLOG) corrective regimen sliding scale   insulin lispro Injectable (HumaLOG) 10  tamsulosin Oral Tab/Cap - Peds 0.4      ANTIBIOTICS:  ertapenem  IVPB 1000 milliGRAM(s) IV Intermittent once      All available historical records have been reviewed

## 2019-11-04 NOTE — PROGRESS NOTE ADULT - SUBJECTIVE AND OBJECTIVE BOX
ARTHUR DE LEÓN  [facility] T2-3A 023 A ([facility] T2-3A)            Patient was evaluated and examined  by bedside, no active complains                REVIEW OF SYSTEMS:  please see pertinent positives mentioned above, all other 12 ROS negative      T(C): , Max: 36.4 (11-03-19 @ 14:21)  HR: 68 (11-04-19 @ 12:27)  BP: 161/77 (11-04-19 @ 12:27)  RR: 18 (11-04-19 @ 12:27)  SpO2: --  CAPILLARY BLOOD GLUCOSE      POCT Blood Glucose.: 171 mg/dL (04 Nov 2019 11:39)  POCT Blood Glucose.: 155 mg/dL (04 Nov 2019 07:37)  POCT Blood Glucose.: 183 mg/dL (03 Nov 2019 22:08)  POCT Blood Glucose.: 257 mg/dL (03 Nov 2019 17:45)  POCT Blood Glucose.: 198 mg/dL (03 Nov 2019 16:31)      PHYSICAL EXAM:  General: NAD, AAOX3, patient is laying comfortably in bed  HEENT: AT, NC, Supple, NO JVD, NO CB  Lungs: CTA B/L, no wheezing, no rhonchi  CVS: normal S1, S2, RRR, NO M/G/R  Abdomen: soft, bowel sounds present, non-tender, non-distended  Extremities: no edema, no clubbing, no cyanosis, positive peripheral pulses b/l  Neuro: no acute focal neurological deficits  Skin: no rush, no ecchymosis      LAB  CBC  Date: 11-04-19 @ 06:47  Mean cell Kixroleuxm56.5  Mean cell Hemoglobin Conc34.1  Mean cell Volum 89.4  Platelet count-Automate 226  RBC Count 4.16  Red Cell Distrib Width12.8  WBC Count7.62  % Albumin, Urine--  Hematocrit 37.2  Hemoglobin 12.7  CBC  Date: 11-03-19 @ 13:45  Mean cell Jqnuesjaqu40.5  Mean cell Hemoglobin Conc33.6  Mean cell Volum 90.6  Platelet count-Automate 247  RBC Count 4.17  Red Cell Distrib Width13.1  WBC Count8.47  % Albumin, Urine--  Hematocrit 37.8  Hemoglobin 12.7  CBC  Date: 11-02-19 @ 08:08  Mean cell Edewlnbcbr47.2  Mean cell Hemoglobin Conc33.3  Mean cell Volum 90.6  Platelet count-Automate 226  RBC Count 4.14  Red Cell Distrib Width13.2  WBC Count7.64  % Albumin, Urine--  Hematocrit 37.5  Hemoglobin 12.5  CBC  Date: 11-01-19 @ 08:18  Mean cell Xuzjtlwnix66.1  Mean cell Hemoglobin Conc33.3  Mean cell Volum 90.4  Platelet count-Automate 240  RBC Count 4.28  Red Cell Distrib Width13.3  WBC Count8.28  % Albumin, Urine--  Hematocrit 38.7  Hemoglobin 12.9  CBC  Date: 10-31-19 @ 17:10  Mean cell Mghpurdbqs21.9  Mean cell Hemoglobin Conc34.0  Mean cell Volum 91.0  Platelet count-Automate 236  RBC Count 3.98  Red Cell Distrib Width13.2  WBC Count7.17  % Albumin, Urine--  Hematocrit 36.2  Hemoglobin 12.3    [facility]  11-04-19 @ 06:47  Blood Gas Arterial-Calcium,Ionized--  Blood Urea Nitrogen, Serum 18 mg/dL [10 - 20]  Carbon Dioxide, Serum28 mmol/L [17 - 32]  Chloride, Fgyvy234 mmol/L [98 - 110]  Creatinie, Serum0.8 mg/dL [0.7 - 1.5]  Glucose, Adrar790 mg/dL<H> [70 - 99]  Potassium, Serum4.7 mmol/L [3.5 - 5.0]  Sodium, Serum 139 mmol/L [135 - 146]  [facility]  11-02-19 @ 08:08  Blood Gas Arterial-Calcium,Ionized--  Blood Urea Nitrogen, Serum 18 mg/dL [10 - 20]  Carbon Dioxide, Serum27 mmol/L [17 - 32]  Chloride, Lofki806 mmol/L [98 - 110]  Creatinie, Serum0.7 mg/dL [0.7 - 1.5]  Glucose, Tzppx984 mg/dL<H> [70 - 99]  Potassium, Serum4.6 mmol/L [3.5 - 5.0]  Sodium, Serum 140 mmol/L [135 - 146]  [facility]  11-01-19 @ 08:18  Blood Gas Arterial-Calcium,Ionized--  Blood Urea Nitrogen, Serum 18 mg/dL [10 - 20]  Carbon Dioxide, Serum26 mmol/L [17 - 32]  Chloride, Serum98 mmol/L [98 - 110]  Creatinie, Serum0.8 mg/dL [0.7 - 1.5]  Glucose, Szjwr334 mg/dL<H> [70 - 99]  Potassium, Serum4.3 mmol/L [3.5 - 5.0]  Sodium, Serum 138 mmol/L [135 - 146]  BMP  10-31-19 @ 17:10  Blood Gas Arterial-Calcium,Ionized--  Blood Urea Nitrogen, Serum 16 mg/dL [10 - 20]  Carbon Dioxide, Serum23 mmol/L [17 - 32]  Chloride, Serum98 mmol/L [98 - 110]  Creatinie, Serum1.1 mg/dL [0.7 - 1.5]  Glucose, Oknql554 mg/dL<H> [70 - 99]  Potassium, Serum4.4 mmol/L [3.5 - 5.0]  Sodium, Serum 135 mmol/L [135 - 146]              Microbiology:    Culture - Urine (collected 10-31-19 @ 17:11)  Source: .Urine Clean Catch (Midstream)  Final Report (11-04-19 @ 12:31):    >100,000 CFU/ml Escherichia coli ESBL    Culture in progress  Organism: Escherichia coli ESBL (11-03-19 @ 08:27)  Organism: Escherichia coli ESBL (11-03-19 @ 08:27)      -  Amikacin: S <=16      -  Ampicillin: R >16 These ampicillin results predict results for amoxicillin      -  Ampicillin/Sulbactam: R 16/8 Enterobacter, Citrobacter, and Serratia may develop resistance during prolonged therapy (3-4 days)      -  Aztreonam: R >16      -  Cefazolin: R >16 (MIC_CL_COM_ENTERIC_CEFAZU) For uncomplicated UTI with K. pneumoniae, E. coli, or P. mirablis: VIVI <=16 is sensitive and VIVI >=32 is resistant. This also predicts results for oral agents cefaclor, cefdinir, cefpodoxime, cefprozil, cefuroxime axetil, cephalexin and locarbef for uncomplicated UTI. Note that some isolates may be susceptible to these agents while testing resistant to cefazolin.      -  Cefepime: R 16      -  Cefoxitin: S <=8      -  Ceftriaxone: R >32 Enterobacter, Citrobacter, and Serratia may develop resistance during prolonged therapy      -  Ciprofloxacin: R >2      -  Ertapenem: S <=1      -  Gentamicin: R >8      -  Imipenem: S <=1      -  Levofloxacin: R >4      -  Meropenem: S <=1      -  Nitrofurantoin: I 64 Should not be used to treat pyelonephritis      -  Piperacillin/Tazobactam: R <=16      -  Tigecycline: S <=2      -  Tobramycin: R >8      -  Trimethoprim/Sulfamethoxazole: R >2/38      Method Type: VIVI          Medications:  dextrose 40% Gel 15 Gram(s) Oral once PRN  dextrose 5%. 1000 milliLiter(s) IV Continuous <Continuous>  dextrose 50% Injectable 12.5 Gram(s) IV Push once  dextrose 50% Injectable 25 Gram(s) IV Push once  dextrose 50% Injectable 25 Gram(s) IV Push once  enalapril 10 milliGRAM(s) Oral two times a day  enoxaparin Injectable 40 milliGRAM(s) SubCutaneous daily  glucagon  Injectable 1 milliGRAM(s) IntraMuscular once PRN  influenza   Vaccine 0.5 milliLiter(s) IntraMuscular once  insulin glargine Injectable (LANTUS) 30 Unit(s) SubCutaneous at bedtime  insulin lispro (HumaLOG) corrective regimen sliding scale   SubCutaneous three times a day before meals  insulin lispro Injectable (HumaLOG) 10 Unit(s) SubCutaneous three times a day before meals  tamsulosin Oral Tab/Cap - Peds 0.4 milliGRAM(s) Oral at bedtime        Assessment and Plan:  58 yr old M with PMHx of prostatitis 1 month ago, HTN, DM-2 , BPH admitted for complicated UTI    #) Recurrent complicated UTI, Prostatitis vs. cystitis, bladder pathology  - Was on Bactrim at home for a total of 10 days, recurrence 1 month after the previous episode. Sensitivity for bactrim was <32 ( E coli ESBL).  - UA positive for large leuk est and WBC  - Meropenem 1g IV q8h ;  - urine culture- ESBL EColi- sensitive to Meropenem  -patient will need Mid line for IV Ertapenem tx. upon dc- as per ID IV Ertapenem to be given for total of 21 days.   -s/p midline placement today      #) DMII  - Last A1C was 8 ( in september 2019)  - insulin lispro 10 U qac and lantus 30 U qhs  - Monitor FS qac/hs and adjust accordingly    #) HTN  -BP x 24 hours: BP:  (119/58 - 164/77)  - C/w enalapril tx    #) BPH  - C/w flomax 0.4 mg qd    Diet: Carb consistent DASH    DVT ppx: lovenox 40 mg qd    #Progress Note Handoff: patient is medically stable for d/c home with IV abx. tx. once IV abx. arrangement completed by   Family discussion: pt. by bedside Disposition: Home_once IV abx. arrangement completed

## 2019-11-05 ENCOUNTER — TRANSCRIPTION ENCOUNTER (OUTPATIENT)
Age: 59
End: 2019-11-05

## 2019-11-05 LAB
-  FOSFOMYCIN: SIGNIFICANT CHANGE UP
ALBUMIN SERPL ELPH-MCNC: 4.2 G/DL — SIGNIFICANT CHANGE UP (ref 3.5–5.2)
ALP SERPL-CCNC: 127 U/L — HIGH (ref 30–115)
ALT FLD-CCNC: 18 U/L — SIGNIFICANT CHANGE UP (ref 0–41)
ANION GAP SERPL CALC-SCNC: 12 MMOL/L — SIGNIFICANT CHANGE UP (ref 7–14)
AST SERPL-CCNC: 18 U/L — SIGNIFICANT CHANGE UP (ref 0–41)
BASOPHILS # BLD AUTO: 0.03 K/UL — SIGNIFICANT CHANGE UP (ref 0–0.2)
BASOPHILS NFR BLD AUTO: 0.4 % — SIGNIFICANT CHANGE UP (ref 0–1)
BILIRUB SERPL-MCNC: 0.5 MG/DL — SIGNIFICANT CHANGE UP (ref 0.2–1.2)
BUN SERPL-MCNC: 17 MG/DL — SIGNIFICANT CHANGE UP (ref 10–20)
CALCIUM SERPL-MCNC: 9.2 MG/DL — SIGNIFICANT CHANGE UP (ref 8.5–10.1)
CHLORIDE SERPL-SCNC: 101 MMOL/L — SIGNIFICANT CHANGE UP (ref 98–110)
CO2 SERPL-SCNC: 25 MMOL/L — SIGNIFICANT CHANGE UP (ref 17–32)
CREAT SERPL-MCNC: 0.7 MG/DL — SIGNIFICANT CHANGE UP (ref 0.7–1.5)
CULTURE RESULTS: SIGNIFICANT CHANGE UP
EOSINOPHIL # BLD AUTO: 0.14 K/UL — SIGNIFICANT CHANGE UP (ref 0–0.7)
EOSINOPHIL NFR BLD AUTO: 1.7 % — SIGNIFICANT CHANGE UP (ref 0–8)
GLUCOSE BLDC GLUCOMTR-MCNC: 100 MG/DL — HIGH (ref 70–99)
GLUCOSE BLDC GLUCOMTR-MCNC: 142 MG/DL — HIGH (ref 70–99)
GLUCOSE BLDC GLUCOMTR-MCNC: 143 MG/DL — HIGH (ref 70–99)
GLUCOSE BLDC GLUCOMTR-MCNC: 253 MG/DL — HIGH (ref 70–99)
GLUCOSE SERPL-MCNC: 136 MG/DL — HIGH (ref 70–99)
HCT VFR BLD CALC: 38.4 % — LOW (ref 42–52)
HGB BLD-MCNC: 13.1 G/DL — LOW (ref 14–18)
IMM GRANULOCYTES NFR BLD AUTO: 0.4 % — HIGH (ref 0.1–0.3)
LYMPHOCYTES # BLD AUTO: 2.37 K/UL — SIGNIFICANT CHANGE UP (ref 1.2–3.4)
LYMPHOCYTES # BLD AUTO: 28.9 % — SIGNIFICANT CHANGE UP (ref 20.5–51.1)
MCHC RBC-ENTMCNC: 30.9 PG — SIGNIFICANT CHANGE UP (ref 27–31)
MCHC RBC-ENTMCNC: 34.1 G/DL — SIGNIFICANT CHANGE UP (ref 32–37)
MCV RBC AUTO: 90.6 FL — SIGNIFICANT CHANGE UP (ref 80–94)
METHOD TYPE: SIGNIFICANT CHANGE UP
MONOCYTES # BLD AUTO: 0.54 K/UL — SIGNIFICANT CHANGE UP (ref 0.1–0.6)
MONOCYTES NFR BLD AUTO: 6.6 % — SIGNIFICANT CHANGE UP (ref 1.7–9.3)
NEUTROPHILS # BLD AUTO: 5.1 K/UL — SIGNIFICANT CHANGE UP (ref 1.4–6.5)
NEUTROPHILS NFR BLD AUTO: 62 % — SIGNIFICANT CHANGE UP (ref 42.2–75.2)
NRBC # BLD: 0 /100 WBCS — SIGNIFICANT CHANGE UP (ref 0–0)
ORGANISM # SPEC MICROSCOPIC CNT: SIGNIFICANT CHANGE UP
ORGANISM # SPEC MICROSCOPIC CNT: SIGNIFICANT CHANGE UP
PLATELET # BLD AUTO: 233 K/UL — SIGNIFICANT CHANGE UP (ref 130–400)
POTASSIUM SERPL-MCNC: 4.4 MMOL/L — SIGNIFICANT CHANGE UP (ref 3.5–5)
POTASSIUM SERPL-SCNC: 4.4 MMOL/L — SIGNIFICANT CHANGE UP (ref 3.5–5)
PROT SERPL-MCNC: 6.7 G/DL — SIGNIFICANT CHANGE UP (ref 6–8)
RBC # BLD: 4.24 M/UL — LOW (ref 4.7–6.1)
RBC # FLD: 13.2 % — SIGNIFICANT CHANGE UP (ref 11.5–14.5)
SODIUM SERPL-SCNC: 138 MMOL/L — SIGNIFICANT CHANGE UP (ref 135–146)
WBC # BLD: 8.21 K/UL — SIGNIFICANT CHANGE UP (ref 4.8–10.8)
WBC # FLD AUTO: 8.21 K/UL — SIGNIFICANT CHANGE UP (ref 4.8–10.8)

## 2019-11-05 PROCEDURE — 99232 SBSQ HOSP IP/OBS MODERATE 35: CPT

## 2019-11-05 RX ORDER — MEROPENEM 1 G/30ML
1000 INJECTION INTRAVENOUS EVERY 8 HOURS
Refills: 0 | Status: DISCONTINUED | OUTPATIENT
Start: 2019-11-05 | End: 2019-11-07

## 2019-11-05 RX ORDER — ALPRAZOLAM 0.25 MG
0.25 TABLET ORAL ONCE
Refills: 0 | Status: DISCONTINUED | OUTPATIENT
Start: 2019-11-05 | End: 2019-11-05

## 2019-11-05 RX ADMIN — MEROPENEM 100 MILLIGRAM(S): 1 INJECTION INTRAVENOUS at 14:23

## 2019-11-05 RX ADMIN — Medication 10 UNIT(S): at 12:16

## 2019-11-05 RX ADMIN — Medication 3: at 12:16

## 2019-11-05 RX ADMIN — Medication 10 UNIT(S): at 08:07

## 2019-11-05 RX ADMIN — Medication 10 UNIT(S): at 17:19

## 2019-11-05 RX ADMIN — Medication 10 MILLIGRAM(S): at 17:19

## 2019-11-05 RX ADMIN — Medication 10 MILLIGRAM(S): at 05:14

## 2019-11-05 RX ADMIN — MEROPENEM 100 MILLIGRAM(S): 1 INJECTION INTRAVENOUS at 21:52

## 2019-11-05 RX ADMIN — TAMSULOSIN HYDROCHLORIDE 0.4 MILLIGRAM(S): 0.4 CAPSULE ORAL at 21:52

## 2019-11-05 RX ADMIN — Medication 0.25 MILLIGRAM(S): at 21:51

## 2019-11-05 NOTE — PROGRESS NOTE ADULT - SUBJECTIVE AND OBJECTIVE BOX
SUBJECTIVE:    Patient is a 58y old  Male who presents with a chief complaint of Hematuria and dysuria (05 Nov 2019 10:57)    Currently admitted to medicine with the primary diagnosis of Prostatitis     Today is hospital day 5d. Patient was seen and examined at bedside. This morning he is resting comfortably in bed and reports no new issues or overnight events. Patient endorses no new complaints. Patient denies suprapubic pain, dysuria, hematuria, frequency, urgency, or discharge. Patient denies SOB or Chest pain.     PAST MEDICAL & SURGICAL HISTORY  PAST MEDICAL & SURGICAL HISTORY:  Prostatitis  BPH with urinary obstruction  Diabetes  HTN (hypertension)  No significant past surgical history    SOCIAL HISTORY:    ALLERGIES:  No Known Allergies    MEDICATIONS:  STANDING MEDICATIONS  dextrose 5%. 1000 milliLiter(s) IV Continuous <Continuous>  dextrose 50% Injectable 12.5 Gram(s) IV Push once  dextrose 50% Injectable 25 Gram(s) IV Push once  dextrose 50% Injectable 25 Gram(s) IV Push once  enalapril 10 milliGRAM(s) Oral two times a day  enoxaparin Injectable 40 milliGRAM(s) SubCutaneous daily  influenza   Vaccine 0.5 milliLiter(s) IntraMuscular once  insulin glargine Injectable (LANTUS) 30 Unit(s) SubCutaneous at bedtime  insulin lispro (HumaLOG) corrective regimen sliding scale   SubCutaneous three times a day before meals  insulin lispro Injectable (HumaLOG) 10 Unit(s) SubCutaneous three times a day before meals  meropenem  IVPB 1000 milliGRAM(s) IV Intermittent every 8 hours  tamsulosin Oral Tab/Cap - Peds 0.4 milliGRAM(s) Oral at bedtime    PRN MEDICATIONS  dextrose 40% Gel 15 Gram(s) Oral once PRN  glucagon  Injectable 1 milliGRAM(s) IntraMuscular once PRN    VITALS:   T(F): 97.2  HR: 59  BP: 132/74  RR: 18  SpO2: --    LABS:                        13.1   8.21  )-----------( 233      ( 05 Nov 2019 06:12 )             38.4     11-05    138  |  101  |  17  ----------------------------<  136<H>  4.4   |  25  |  0.7    Ca    9.2      05 Nov 2019 06:12  Mg     1.8     11-04    TPro  6.7  /  Alb  4.2  /  TBili  0.5  /  DBili  x   /  AST  18  /  ALT  18  /  AlkPhos  127<H>  11-05                  RADIOLOGY:  < from: US Kidney and Bladder (10.31.19 @ 17:53) >    IMPRESSION:    No hydronephrosis    Prostatomegaly with approximate prostate volume of 144 mL.     < end of copied text >    PHYSICAL EXAM:  GENERAL: NAD, speaks in full sentences, no signs of respiratory distress  HEAD: Atraumatic  NECK: Supple  CHEST/LUNG: Clear to auscultation bilaterally; No wheeze or crackles  HEART: S1, S2; RRR; No murmurs, rubs, or gallops  ABDOMEN: BS+; Soft, Non-tender, Non-distended  EXTREMITIES:  2+ Peripheral Pulses, No clubbing, cyanosis, or edema  PSYCH: AAOx3  SKIN: No rashes or lesions

## 2019-11-05 NOTE — PROGRESS NOTE ADULT - ASSESSMENT
58 yr old M with PMHx of prostatitis 1 month ago, HTN, DM-2 , BPH admitted for complicated UTI    #) Recurrent complicated UTI, Prostatitis vs. cystitis, bladder pathology  - Was on Bactrim at home for a total of 10 days, recurrence 1 month after the previous episode. Sensitivity for bactrim was <32 ( E coli ESBL).  - UA positive for large leuk est and WBC  - Meropenem 1g IV q8h   - UCx positive for ESBL EColi  - Patient ready for d/c with 21 days total IV Abx Rx via Midline (ends 11/21/19)  - Midline placed   - Switch to Ertapenem 1g q 24 on discharge       #) DMII  - Last A1C was 8 ( in september 2019)  - insulin lispro 10 U qac and lantus 30 U qhs  - Monitor FS qac/hs and adjust accordingly    #) HTN  - C/w enalapril 10 mg q12h Po    #) BPH  - C/w flomax 0.4 mg qd  - Start finasteride on d/c as per urology  - op f/u with Dr. Fraser     Diet: Carb consistent DASH    DVT ppx: lovenox 40 mg qd    GI ppx: none    Activity: ambulate as tolerated    Dispo: From home    Code status: FULL 58 yr old M with PMHx of prostatitis 1 month ago, HTN, DM-2 , BPH admitted for complicated UTI    #) Recurrent complicated UTI, Prostatitis vs. cystitis, bladder pathology  - Was on Bactrim at home for a total of 10 days, recurrence 1 month after the previous episode. Sensitivity for bactrim was <32 ( E coli ESBL).  - UA positive for large leuk est and WBC  - Meropenem 1g IV q8h   - UCx positive for ESBL EColi  - Patient ready for d/c with 21 days total IV Abx Rx via Midline (ends 11/21/19)  - Midline placed   - Switch to Ertapenem 1g q 24 on discharge   - follow up with urologist Dr. Fraser within 1 week of discharge.       #) DMII  - Last A1C was 8 ( in september 2019)  - insulin lispro 10 U qac and lantus 30 U qhs  - Monitor FS qac/hs and adjust accordingly    #) HTN  - C/w enalapril 10 mg q12h Po    #) BPH  - C/w flomax 0.4 mg qd  - Start finasteride on d/c as per urology  - op f/u with Dr. Fraser     Diet: Carb consistent DASH    DVT ppx: lovenox 40 mg qd    GI ppx: none    Activity: ambulate as tolerated    Dispo: From home    Code status: FULL

## 2019-11-05 NOTE — DISCHARGE NOTE PROVIDER - NSDCCPCAREPLAN_GEN_ALL_CORE_FT
PRINCIPAL DISCHARGE DIAGNOSIS  Diagnosis: Prostatitis  Assessment and Plan of Treatment: You were admitted for UTI and prostatitis. We treated you with IV antibiotics. You will be taking these antibiotics for 21 days with an end date of 11/21/19. Please take medication as perscribed. Please follow up with Dr. Fraser, your urologist, within one week of discharge. Please follow up with your primary care physician within one week of discharge.      SECONDARY DISCHARGE DIAGNOSES  Diagnosis: Diabetes  Assessment and Plan of Treatment: You have been diagnosed with diabetes in the past. We managed your diabetes with medication to control your sugars while admitted. Please follow up with your primary care doctor within one week of discharge.

## 2019-11-05 NOTE — DISCHARGE NOTE PROVIDER - PROVIDER TOKENS
FREE:[LAST:[English],FIRST:[Gisela],PHONE:[(   )    -],FAX:[(   )    -],ADDRESS:[Primary care provider]],PROVIDER:[TOKEN:[[id number]]]

## 2019-11-05 NOTE — DISCHARGE NOTE PROVIDER - NSDCFUSCHEDAPPT_GEN_ALL_CORE_FT
ARTHUR DE LEÓN ; 11/19/2019 ; [facility] [address] [place] [address] ARTHUR DE LEÓN ; 11/19/2019 ; [facility] [address] [place] [address] ARTHUR DE LEÓN ; 11/19/2019 ; [facility] [address] [place] [address] ARTHUR DE LEÓN ; 11/19/2019 ; [facility] [address] [place] [address] ARTHUR DE LEÓN ; 11/19/2019 ; [facility] [address] [place] [address] ARTHUR DE LEÓN ; 11/19/2019 ; [facility] [address] [place] [address]

## 2019-11-05 NOTE — DISCHARGE NOTE PROVIDER - CARE PROVIDERS DIRECT ADDRESSES
,DirectAddress_Unknown,[email or web address]

## 2019-11-05 NOTE — DISCHARGE NOTE PROVIDER - CARE PROVIDER_API CALL
Gisela Kovacs  Primary care provider  Phone: (   )    -  Fax: (   )    -  Follow Up Time:     Alex Fraser)  [facility]  [address] [place] [address], [address]  [place] [address]  Phone: [phone]  Fax: [phone]  Follow Up Time:

## 2019-11-05 NOTE — DISCHARGE NOTE PROVIDER - NSDCMRMEDTOKEN_GEN_ALL_CORE_FT
enalapril 10 mg oral tablet: 1 tab(s) orally 2 times a day  Januvia 50 mg oral tablet: 1 tab(s) orally once a day  metFORMIN 1000 mg oral tablet: 1 tab(s) orally 2 times a day  tamsulosin 0.4 mg oral capsule: 1 cap(s) orally once a day (at bedtime)

## 2019-11-06 LAB
ALBUMIN SERPL ELPH-MCNC: 4.5 G/DL — SIGNIFICANT CHANGE UP (ref 3.5–5.2)
ALP SERPL-CCNC: 136 U/L — HIGH (ref 30–115)
ALT FLD-CCNC: 18 U/L — SIGNIFICANT CHANGE UP (ref 0–41)
ANION GAP SERPL CALC-SCNC: 14 MMOL/L — SIGNIFICANT CHANGE UP (ref 7–14)
AST SERPL-CCNC: 19 U/L — SIGNIFICANT CHANGE UP (ref 0–41)
BASOPHILS # BLD AUTO: 0.02 K/UL — SIGNIFICANT CHANGE UP (ref 0–0.2)
BASOPHILS NFR BLD AUTO: 0.3 % — SIGNIFICANT CHANGE UP (ref 0–1)
BILIRUB SERPL-MCNC: 0.6 MG/DL — SIGNIFICANT CHANGE UP (ref 0.2–1.2)
BUN SERPL-MCNC: 16 MG/DL — SIGNIFICANT CHANGE UP (ref 10–20)
CALCIUM SERPL-MCNC: 9.4 MG/DL — SIGNIFICANT CHANGE UP (ref 8.5–10.1)
CHLORIDE SERPL-SCNC: 101 MMOL/L — SIGNIFICANT CHANGE UP (ref 98–110)
CO2 SERPL-SCNC: 24 MMOL/L — SIGNIFICANT CHANGE UP (ref 17–32)
CREAT SERPL-MCNC: 0.7 MG/DL — SIGNIFICANT CHANGE UP (ref 0.7–1.5)
EOSINOPHIL # BLD AUTO: 0.14 K/UL — SIGNIFICANT CHANGE UP (ref 0–0.7)
EOSINOPHIL NFR BLD AUTO: 1.8 % — SIGNIFICANT CHANGE UP (ref 0–8)
GLUCOSE BLDC GLUCOMTR-MCNC: 128 MG/DL — HIGH (ref 70–99)
GLUCOSE BLDC GLUCOMTR-MCNC: 163 MG/DL — HIGH (ref 70–99)
GLUCOSE BLDC GLUCOMTR-MCNC: 186 MG/DL — HIGH (ref 70–99)
GLUCOSE BLDC GLUCOMTR-MCNC: 186 MG/DL — HIGH (ref 70–99)
GLUCOSE BLDC GLUCOMTR-MCNC: 203 MG/DL — HIGH (ref 70–99)
GLUCOSE SERPL-MCNC: 183 MG/DL — HIGH (ref 70–99)
HCT VFR BLD CALC: 41.8 % — LOW (ref 42–52)
HGB BLD-MCNC: 14 G/DL — SIGNIFICANT CHANGE UP (ref 14–18)
IMM GRANULOCYTES NFR BLD AUTO: 0.3 % — SIGNIFICANT CHANGE UP (ref 0.1–0.3)
LYMPHOCYTES # BLD AUTO: 2.45 K/UL — SIGNIFICANT CHANGE UP (ref 1.2–3.4)
LYMPHOCYTES # BLD AUTO: 32.2 % — SIGNIFICANT CHANGE UP (ref 20.5–51.1)
MCHC RBC-ENTMCNC: 30.3 PG — SIGNIFICANT CHANGE UP (ref 27–31)
MCHC RBC-ENTMCNC: 33.5 G/DL — SIGNIFICANT CHANGE UP (ref 32–37)
MCV RBC AUTO: 90.5 FL — SIGNIFICANT CHANGE UP (ref 80–94)
MONOCYTES # BLD AUTO: 0.42 K/UL — SIGNIFICANT CHANGE UP (ref 0.1–0.6)
MONOCYTES NFR BLD AUTO: 5.5 % — SIGNIFICANT CHANGE UP (ref 1.7–9.3)
NEUTROPHILS # BLD AUTO: 4.55 K/UL — SIGNIFICANT CHANGE UP (ref 1.4–6.5)
NEUTROPHILS NFR BLD AUTO: 59.9 % — SIGNIFICANT CHANGE UP (ref 42.2–75.2)
NRBC # BLD: 0 /100 WBCS — SIGNIFICANT CHANGE UP (ref 0–0)
PLATELET # BLD AUTO: 247 K/UL — SIGNIFICANT CHANGE UP (ref 130–400)
POTASSIUM SERPL-MCNC: 4.5 MMOL/L — SIGNIFICANT CHANGE UP (ref 3.5–5)
POTASSIUM SERPL-SCNC: 4.5 MMOL/L — SIGNIFICANT CHANGE UP (ref 3.5–5)
PROT SERPL-MCNC: 7.3 G/DL — SIGNIFICANT CHANGE UP (ref 6–8)
RBC # BLD: 4.62 M/UL — LOW (ref 4.7–6.1)
RBC # FLD: 13 % — SIGNIFICANT CHANGE UP (ref 11.5–14.5)
SODIUM SERPL-SCNC: 139 MMOL/L — SIGNIFICANT CHANGE UP (ref 135–146)
WBC # BLD: 7.6 K/UL — SIGNIFICANT CHANGE UP (ref 4.8–10.8)
WBC # FLD AUTO: 7.6 K/UL — SIGNIFICANT CHANGE UP (ref 4.8–10.8)

## 2019-11-06 PROCEDURE — 99232 SBSQ HOSP IP/OBS MODERATE 35: CPT

## 2019-11-06 RX ORDER — CHLORHEXIDINE GLUCONATE 213 G/1000ML
1 SOLUTION TOPICAL
Refills: 0 | Status: DISCONTINUED | OUTPATIENT
Start: 2019-11-06 | End: 2019-11-07

## 2019-11-06 RX ADMIN — MEROPENEM 100 MILLIGRAM(S): 1 INJECTION INTRAVENOUS at 14:11

## 2019-11-06 RX ADMIN — MEROPENEM 100 MILLIGRAM(S): 1 INJECTION INTRAVENOUS at 21:43

## 2019-11-06 RX ADMIN — MEROPENEM 100 MILLIGRAM(S): 1 INJECTION INTRAVENOUS at 05:02

## 2019-11-06 RX ADMIN — Medication 2: at 11:55

## 2019-11-06 RX ADMIN — Medication 10 UNIT(S): at 11:55

## 2019-11-06 RX ADMIN — TAMSULOSIN HYDROCHLORIDE 0.4 MILLIGRAM(S): 0.4 CAPSULE ORAL at 21:43

## 2019-11-06 RX ADMIN — Medication 10 UNIT(S): at 17:02

## 2019-11-06 RX ADMIN — Medication 10 MILLIGRAM(S): at 17:02

## 2019-11-06 RX ADMIN — Medication 10 UNIT(S): at 07:55

## 2019-11-06 RX ADMIN — CHLORHEXIDINE GLUCONATE 1 APPLICATION(S): 213 SOLUTION TOPICAL at 05:03

## 2019-11-06 RX ADMIN — Medication 10 MILLIGRAM(S): at 05:03

## 2019-11-06 RX ADMIN — Medication 1: at 17:03

## 2019-11-06 RX ADMIN — Medication 1: at 07:55

## 2019-11-06 NOTE — PROGRESS NOTE ADULT - ASSESSMENT
58 yr old M with PMHx of prostatitis 1 month ago, HTN, DM-2 , BPH admitted for complicated UTI    #) Recurrent complicated UTI, Prostatitis vs. cystitis, bladder pathology  - Was on Bactrim at home for a total of 10 days, recurrence 1 month after the previous episode. Sensitivity for bactrim was <32 ( E coli ESBL).  - UA positive for large leuk est and WBC  - Meropenem 1g IV q8h   - UCx positive for ESBL EColi  - Patient ready for d/c with 21 days total IV Abx Rx via Midline (ends 11/21/19)  - Midline placed   - Switch to Ertapenem 1g q 24 on discharge   - follow up with urologist Dr. Fraser within 1 week of discharge.   - Dispo pending arragment from        #) DMII  - Last A1C was 8 ( in september 2019)  - insulin lispro 10 U qac and lantus 30 U qhs  - Monitor FS qac/hs and adjust accordingly    #) HTN  - C/w enalapril 10 mg q12h Po    #) BPH  - C/w flomax 0.4 mg qd  - Start finasteride on d/c as per urology  - op f/u with Dr. Fraser     Diet: Carb consistent DASH    DVT ppx: lovenox 40 mg qd    GI ppx: none    Activity: ambulate as tolerated    Dispo: From home

## 2019-11-06 NOTE — PROGRESS NOTE ADULT - ATTENDING COMMENTS
58 yr old M with PMHx of prostatitis 1 month ago, HTN, DM-2 , BPH admitted for complicated UTI    #) Recurrent complicated UTI, Prostatitis vs. cystitis, bladder pathology  - Was on Bactrim at home for a total of 10 days, recurrence 1 month after the previous episode. Sensitivity for bactrim was <32 ( E coli ESBL).  - UA positive for large leuk est and WBC  - Meropenem 1g IV q8h ;  - urine culture- ESBL EColi- sensitive to Meropenem  -patient will need Mid line for IV Ertapenem tx. upon dc- as per ID IV Ertapenem to be given for total of 21 days.   -s/p midline placement      #) DMII  - Last A1C was 8 ( in september 2019)  - insulin lispro 10 U qac and lantus 30 U qhs  - Monitor FS qac/hs and adjust accordingly    #) HTN  -BP x 24 hours: BP:  (119/58 - 164/77)  - C/w enalapril tx    #) BPH  - C/w flomax 0.4 mg qd    Diet: Carb consistent DASH    DVT ppx: lovenox 40 mg qd    #Progress Note Handoff: patient is medically stable for d/c home with IV abx. tx. once IV abx. arrangement completed by ( we might change to IV Meropenem if it will be cheaper for pt's outpatient IV tx. )  Family discussion: pt. by bedside Disposition: Home_once IV abx. arrangement completed .
58 yr old M with PMHx of prostatitis 1 month ago, HTN, DM-2 , BPH admitted for complicated UTI    #) Recurrent complicated UTI, Prostatitis vs. cystitis, bladder pathology  - Was on Bactrim at home for a total of 10 days, recurrence 1 month after the previous episode. Sensitivity for bactrim was <32 ( E coli ESBL).  - UA positive for large leuk est and WBC  - Meropenem 1g IV q8h ;  - urine culture- ESBL EColi- sensitive to Meropenem  -patient will need Mid line for IV Ertapenem tx. upon dc- as per ID IV Ertapenem to be given for total of 21 days.   -s/p midline placement today      #) DMII  - Last A1C was 8 ( in september 2019)  - insulin lispro 10 U qac and lantus 30 U qhs  - Monitor FS qac/hs and adjust accordingly    #) HTN  -BP x 24 hours: BP:  (119/58 - 164/77)  - C/w enalapril tx    #) BPH  - C/w flomax 0.4 mg qd    Diet: Carb consistent DASH    DVT ppx: lovenox 40 mg qd    #Progress Note Handoff: patient is medically stable for d/c home with IV abx. tx. once IV abx. arrangement completed by   Family discussion: pt. by bedside Disposition: Home_once IV abx. arrangement completed

## 2019-11-06 NOTE — PROGRESS NOTE ADULT - SUBJECTIVE AND OBJECTIVE BOX
SUBJECTIVE:    Patient is a 58y old  Male who presents with a chief complaint of Hematuria and dysuria (05 Nov 2019 11:24)    Currently admitted to medicine with the primary diagnosis of Prostatitis     Today is hospital day 6d. Patient was seen and examined at bedside. This morning he is resting comfortably in bed and reports no new issues or overnight events. Patient endorses no new complaints. Patient denies hematuria. Patient denies abdominal pain, SOB, or chest pain.     PAST MEDICAL & SURGICAL HISTORY  PAST MEDICAL & SURGICAL HISTORY:  Prostatitis  BPH with urinary obstruction  Diabetes  HTN (hypertension)  No significant past surgical history    SOCIAL HISTORY:    ALLERGIES:  No Known Allergies    MEDICATIONS:  STANDING MEDICATIONS  chlorhexidine 4% Liquid 1 Application(s) Topical two times a day  dextrose 5%. 1000 milliLiter(s) IV Continuous <Continuous>  dextrose 50% Injectable 12.5 Gram(s) IV Push once  dextrose 50% Injectable 25 Gram(s) IV Push once  dextrose 50% Injectable 25 Gram(s) IV Push once  enalapril 10 milliGRAM(s) Oral two times a day  enoxaparin Injectable 40 milliGRAM(s) SubCutaneous daily  influenza   Vaccine 0.5 milliLiter(s) IntraMuscular once  insulin glargine Injectable (LANTUS) 30 Unit(s) SubCutaneous at bedtime  insulin lispro (HumaLOG) corrective regimen sliding scale   SubCutaneous three times a day before meals  insulin lispro Injectable (HumaLOG) 10 Unit(s) SubCutaneous three times a day before meals  meropenem  IVPB 1000 milliGRAM(s) IV Intermittent every 8 hours  tamsulosin Oral Tab/Cap - Peds 0.4 milliGRAM(s) Oral at bedtime    PRN MEDICATIONS  dextrose 40% Gel 15 Gram(s) Oral once PRN  glucagon  Injectable 1 milliGRAM(s) IntraMuscular once PRN    VITALS:   T(F): 96.4  HR: 63  BP: 122/66  RR: 18  SpO2: --    LABS:                        14.0   7.60  )-----------( 247      ( 06 Nov 2019 08:00 )             41.8     11-05    138  |  101  |  17  ----------------------------<  136<H>  4.4   |  25  |  0.7    Ca    9.2      05 Nov 2019 06:12    TPro  6.7  /  Alb  4.2  /  TBili  0.5  /  DBili  x   /  AST  18  /  ALT  18  /  AlkPhos  127<H>  11-05                  RADIOLOGY:  < from: US Kidney and Bladder (10.31.19 @ 17:53) >  IMPRESSION:    No hydronephrosis    Prostatomegaly with approximate prostate volume of 144 mL.       < end of copied text >    PHYSICAL EXAM:  GENERAL: NAD, speaks in full sentences, no signs of respiratory distress  HEAD: Atraumatic  CHEST/LUNG: Clear to auscultation bilaterally; No wheeze or crackles  HEART: S1, S2; RRR; No murmurs, rubs, or gallops  ABDOMEN: BS+; Soft, Non-tender, Non-distended  EXTREMITIES:  2+ Peripheral Pulses  PSYCH: AAOx3

## 2019-11-07 VITALS
HEART RATE: 61 BPM | RESPIRATION RATE: 18 BRPM | SYSTOLIC BLOOD PRESSURE: 118 MMHG | TEMPERATURE: 98 F | DIASTOLIC BLOOD PRESSURE: 69 MMHG

## 2019-11-07 LAB
GLUCOSE BLDC GLUCOMTR-MCNC: 126 MG/DL — HIGH (ref 70–99)
GLUCOSE BLDC GLUCOMTR-MCNC: 177 MG/DL — HIGH (ref 70–99)
GLUCOSE BLDC GLUCOMTR-MCNC: 225 MG/DL — HIGH (ref 70–99)

## 2019-11-07 PROCEDURE — 99239 HOSP IP/OBS DSCHRG MGMT >30: CPT

## 2019-11-07 RX ORDER — MEROPENEM 1 G/30ML
2000 INJECTION INTRAVENOUS
Qty: 0 | Refills: 0 | DISCHARGE
Start: 2019-11-07

## 2019-11-07 RX ORDER — MEROPENEM 1 G/30ML
2000 INJECTION INTRAVENOUS EVERY 12 HOURS
Refills: 0 | Status: DISCONTINUED | OUTPATIENT
Start: 2019-11-07 | End: 2019-11-07

## 2019-11-07 RX ADMIN — MEROPENEM 100 MILLIGRAM(S): 1 INJECTION INTRAVENOUS at 05:41

## 2019-11-07 RX ADMIN — MEROPENEM 200 MILLIGRAM(S): 1 INJECTION INTRAVENOUS at 17:02

## 2019-11-07 RX ADMIN — Medication 10 MILLIGRAM(S): at 17:03

## 2019-11-07 RX ADMIN — Medication 10 UNIT(S): at 07:54

## 2019-11-07 RX ADMIN — Medication 2: at 11:49

## 2019-11-07 RX ADMIN — Medication 10 UNIT(S): at 11:49

## 2019-11-07 RX ADMIN — Medication 10 MILLIGRAM(S): at 05:41

## 2019-11-07 RX ADMIN — Medication 10 UNIT(S): at 17:03

## 2019-11-07 RX ADMIN — Medication 1: at 07:54

## 2019-11-07 NOTE — PROGRESS NOTE ADULT - SUBJECTIVE AND OBJECTIVE BOX
ARTHUR DE LEÓN  [facility] T2-3A 023 A ([facility] T2-3A)            Patient was evaluated and examined  by bedside, no active complains                 REVIEW OF SYSTEMS:  please see pertinent positives mentioned above, all other 12 ROS negative      T(C): , Max: 36.5 (11-07-19 @ 13:00)  HR: 61 (11-07-19 @ 13:00)  BP: 118/69 (11-07-19 @ 13:00)  RR: 18 (11-07-19 @ 13:00)  SpO2: --  CAPILLARY BLOOD GLUCOSE      POCT Blood Glucose.: 225 mg/dL (07 Nov 2019 11:35)  POCT Blood Glucose.: 177 mg/dL (07 Nov 2019 07:39)  POCT Blood Glucose.: 128 mg/dL (06 Nov 2019 21:39)  POCT Blood Glucose.: 186 mg/dL (06 Nov 2019 17:02)  POCT Blood Glucose.: 186 mg/dL (06 Nov 2019 16:48)      PHYSICAL EXAM:  General: NAD, AAOX3, patient is laying comfortably in bed  HEENT: AT, NC, Supple, NO JVD, NO CB  Lungs: CTA B/L, no wheezing, no rhonchi  CVS: normal S1, S2, RRR, NO M/G/R  Abdomen: soft, bowel sounds present, non-tender, non-distended  Extremities: no edema, no clubbing, no cyanosis, positive peripheral pulses b/l  Neuro: no acute focal neurological deficits  Skin: no rush, no ecchymosis      LAB  CBC  Date: 11-06-19 @ 08:00  Mean cell Tubespsvdu89.3  Mean cell Hemoglobin Conc33.5  Mean cell Volum 90.5  Platelet count-Automate 247  RBC Count 4.62  Red Cell Distrib Width13.0  WBC Count7.60  % Albumin, Urine--  Hematocrit 41.8  Hemoglobin 14.0  CBC  Date: 11-05-19 @ 06:12  Mean cell Jymavrrlhb99.9  Mean cell Hemoglobin Conc34.1  Mean cell Volum 90.6  Platelet count-Automate 233  RBC Count 4.24  Red Cell Distrib Width13.2  WBC Count8.21  % Albumin, Urine--  Hematocrit 38.4  Hemoglobin 13.1  CBC  Date: 11-04-19 @ 06:47  Mean cell Ilxbhvibud90.5  Mean cell Hemoglobin Conc34.1  Mean cell Volum 89.4  Platelet count-Automate 226  RBC Count 4.16  Red Cell Distrib Width12.8  WBC Count7.62  % Albumin, Urine--  Hematocrit 37.2  Hemoglobin 12.7  CBC  Date: 11-03-19 @ 13:45  Mean cell Iuvoyzplvd31.5  Mean cell Hemoglobin Conc33.6  Mean cell Volum 90.6  Platelet count-Automate 247  RBC Count 4.17  Red Cell Distrib Width13.1  WBC Count8.47  % Albumin, Urine--  Hematocrit 37.8  Hemoglobin 12.7  CBC  Date: 11-02-19 @ 08:08  Mean cell Nhtcqighcz85.2  Mean cell Hemoglobin Conc33.3  Mean cell Volum 90.6  Platelet count-Automate 226  RBC Count 4.14  Red Cell Distrib Width13.2  WBC Count7.64  % Albumin, Urine--  Hematocrit 37.5  Hemoglobin 12.5  CBC  Date: 11-01-19 @ 08:18  Mean cell Vwkycvtlvq11.1  Mean cell Hemoglobin Conc33.3  Mean cell Volum 90.4  Platelet count-Automate 240  RBC Count 4.28  Red Cell Distrib Width13.3  WBC Count8.28  % Albumin, Urine--  Hematocrit 38.7  Hemoglobin 12.9  CBC  Date: 10-31-19 @ 17:10  Mean cell Judnytvdnh14.9  Mean cell Hemoglobin Conc34.0  Mean cell Volum 91.0  Platelet count-Automate 236  RBC Count 3.98  Red Cell Distrib Width13.2  WBC Count7.17  % Albumin, Urine--  Hematocrit 36.2  Hemoglobin 12.3    [facility]  11-06-19 @ 08:00  Blood Gas Arterial-Calcium,Ionized--  Blood Urea Nitrogen, Serum 16 mg/dL [10 - 20]  Carbon Dioxide, Serum24 mmol/L [17 - 32]  Chloride, Keuxu345 mmol/L [98 - 110]  Creatinie, Serum0.7 mg/dL [0.7 - 1.5]  Glucose, Mzjyq905 mg/dL<H> [70 - 99]  Potassium, Serum4.5 mmol/L [3.5 - 5.0]  Sodium, Serum 139 mmol/L [135 - 146]  [facility]  11-05-19 @ 06:12  Blood Gas Arterial-Calcium,Ionized--  Blood Urea Nitrogen, Serum 17 mg/dL [10 - 20]  Carbon Dioxide, Serum25 mmol/L [17 - 32]  Chloride, Bzupf830 mmol/L [98 - 110]  Creatinie, Serum0.7 mg/dL [0.7 - 1.5]  Glucose, Esdhn123 mg/dL<H> [70 - 99]  Potassium, Serum4.4 mmol/L [3.5 - 5.0]  Sodium, Serum 138 mmol/L [135 - 146]  BMP  11-04-19 @ 06:47  Blood Gas Arterial-Calcium,Ionized--  Blood Urea Nitrogen, Serum 18 mg/dL [10 - 20]  Carbon Dioxide, Serum28 mmol/L [17 - 32]  Chloride, Dbxyn888 mmol/L [98 - 110]  Creatinie, Serum0.8 mg/dL [0.7 - 1.5]  Glucose, Xvhjy988 mg/dL<H> [70 - 99]  Potassium, Serum4.7 mmol/L [3.5 - 5.0]  Sodium, Serum 139 mmol/L [135 - 146]              Microbiology:    Culture - Urine (collected 10-31-19 @ 17:11)  Source: .Urine Clean Catch (Midstream)  Final Report (11-05-19 @ 10:37):    >100,000 CFU/ml Escherichia coli ESBL  Organism: Escherichia coli ESBL  Escherichia coli ESBL (11-05-19 @ 10:36)  Organism: Escherichia coli ESBL (11-05-19 @ 10:36)      -  Fosfomycin: S      Method Type: KB  Organism: Escherichia coli ESBL (11-03-19 @ 08:27)      -  Amikacin: S <=16      -  Ampicillin: R >16 These ampicillin results predict results for amoxicillin      -  Ampicillin/Sulbactam: R 16/8 Enterobacter, Citrobacter, and Serratia may develop resistance during prolonged therapy (3-4 days)      -  Aztreonam: R >16      -  Cefazolin: R >16 (MIC_CL_COM_ENTERIC_CEFAZU) For uncomplicated UTI with K. pneumoniae, E. coli, or P. mirablis: VIVI <=16 is sensitive and VIVI >=32 is resistant. This also predicts results for oral agents cefaclor, cefdinir, cefpodoxime, cefprozil, cefuroxime axetil, cephalexin and locarbef for uncomplicated UTI. Note that some isolates may be susceptible to these agents while testing resistant to cefazolin.      -  Cefepime: R 16      -  Cefoxitin: S <=8      -  Ceftriaxone: R >32 Enterobacter, Citrobacter, and Serratia may develop resistance during prolonged therapy      -  Ciprofloxacin: R >2      -  Ertapenem: S <=1      -  Gentamicin: R >8      -  Imipenem: S <=1      -  Levofloxacin: R >4      -  Meropenem: S <=1      -  Nitrofurantoin: I 64 Should not be used to treat pyelonephritis      -  Piperacillin/Tazobactam: R <=16      -  Tigecycline: S <=2      -  Tobramycin: R >8      -  Trimethoprim/Sulfamethoxazole: R >2/38      Method Type: VIVI        Medications:  chlorhexidine 4% Liquid 1 Application(s) Topical two times a day  dextrose 40% Gel 15 Gram(s) Oral once PRN  dextrose 5%. 1000 milliLiter(s) IV Continuous <Continuous>  dextrose 50% Injectable 12.5 Gram(s) IV Push once  dextrose 50% Injectable 25 Gram(s) IV Push once  dextrose 50% Injectable 25 Gram(s) IV Push once  enalapril 10 milliGRAM(s) Oral two times a day  enoxaparin Injectable 40 milliGRAM(s) SubCutaneous daily  glucagon  Injectable 1 milliGRAM(s) IntraMuscular once PRN  influenza   Vaccine 0.5 milliLiter(s) IntraMuscular once  insulin glargine Injectable (LANTUS) 30 Unit(s) SubCutaneous at bedtime  insulin lispro (HumaLOG) corrective regimen sliding scale   SubCutaneous three times a day before meals  insulin lispro Injectable (HumaLOG) 10 Unit(s) SubCutaneous three times a day before meals  meropenem  IVPB 2000 milliGRAM(s) IV Intermittent every 12 hours  tamsulosin Oral Tab/Cap - Peds 0.4 milliGRAM(s) Oral at bedtime        Assessment and Plan:  58 yr old M with PMHx of prostatitis 1 month ago, HTN, DM-2 , BPH admitted for complicated UTI    #) Recurrent complicated UTI, Prostatitis vs. cystitis, bladder pathology  - Was on Bactrim at home for a total of 10 days, recurrence 1 month after the previous episode. Sensitivity for bactrim was <32 ( E coli ESBL).  - UA positive for large leuk est and WBC  - Meropenem 1g IV q8h ;  - urine culture- ESBL EColi- sensitive to Meropenem  -patient s/p Midline placement patient will be d/c on IV Meropenem 2 gm twice daily tx. for total of  21 days- as per ID recommendations.   -Patient was instructed to f/up with  post d/c home in 1 week.     #) DMII  - Last A1C was 8 ( in september 2019)  - insulin lispro 10 U qac and lantus 30 U qhs  - Monitor FS qac/hs and adjust accordingly    #) HTN  -BP x 24 hours: BP:  (119/58 - 164/77)  - C/w enalapril tx    #) BPH  - C/w flomax 0.4 mg qd    Diet: Carb consistent DASH    DVT ppx: lovenox 40 mg qd    #Progress Note Handoff: upon d/c home patient to be continued on  IV Meropenem twice daily tx. with outpatient PCP f/up. and  f/up   Family discussion: discharge plan was d/w patient in details. Disposition: Home with IV abx.today.

## 2019-11-07 NOTE — CHART NOTE - NSCHARTNOTEFT_GEN_A_CORE
<<<RESIDENT DISCHARGE NOTE>>>     ARTHUR DE LEÓN  MRN-[id number]    VITAL SIGNS:  T(F): 97.5 (11-07-19 @ 05:00), Max: 97.5 (11-07-19 @ 05:00)  HR: 69 (11-07-19 @ 05:00)  BP: 151/70 (11-07-19 @ 05:00)  SpO2: --      PHYSICAL EXAMINATION:  General: NAD  Head & Neck: no JVD  Pulmonary: equal breath sounds bilateral  Cardiovascular: RRR, S1S2  Gastrointestinal/Abdomen & Pelvis: BS(+), non tender, non discharged   Neurologic/Motor: no focal deficits     TEST RESULTS:                        14.0   7.60  )-----------( 247      ( 06 Nov 2019 08:00 )             41.8       11-06    139  |  101  |  16  ----------------------------<  183<H>  4.5   |  24  |  0.7    Ca    9.4      06 Nov 2019 08:00    TPro  7.3  /  Alb  4.5  /  TBili  0.6  /  DBili  x   /  AST  19  /  ALT  18  /  AlkPhos  136<H>  11-06      FINAL DISCHARGE INTERVIEW:  Resident(s) Present: (Name:______Sanjeev Doshi _______),     DISCHARGE MEDICATION RECONCILIATION  reviewed with Attending (Name:____Dr. Padilla_______)    DISPOSITION:   [  ] Home

## 2019-11-07 NOTE — PROGRESS NOTE ADULT - REASON FOR ADMISSION
Hematuria and dysuria

## 2019-11-12 DIAGNOSIS — N40.0 BENIGN PROSTATIC HYPERPLASIA WITHOUT LOWER URINARY TRACT SYMPTOMS: ICD-10-CM

## 2019-11-12 DIAGNOSIS — I10 ESSENTIAL (PRIMARY) HYPERTENSION: ICD-10-CM

## 2019-11-12 DIAGNOSIS — B96.20 UNSPECIFIED ESCHERICHIA COLI [E. COLI] AS THE CAUSE OF DISEASES CLASSIFIED ELSEWHERE: ICD-10-CM

## 2019-11-12 DIAGNOSIS — Z79.84 LONG TERM (CURRENT) USE OF ORAL HYPOGLYCEMIC DRUGS: ICD-10-CM

## 2019-11-12 DIAGNOSIS — E11.9 TYPE 2 DIABETES MELLITUS WITHOUT COMPLICATIONS: ICD-10-CM

## 2019-11-12 DIAGNOSIS — N41.3 PROSTATOCYSTITIS: ICD-10-CM

## 2019-11-12 DIAGNOSIS — Z16.30 RESISTANCE TO UNSPECIFIED ANTIMICROBIAL DRUGS: ICD-10-CM

## 2019-11-12 DIAGNOSIS — R31.9 HEMATURIA, UNSPECIFIED: ICD-10-CM

## 2019-11-19 ENCOUNTER — APPOINTMENT (OUTPATIENT)
Dept: UROLOGY | Facility: CLINIC | Age: 59
End: 2019-11-19

## 2019-11-21 ENCOUNTER — EMERGENCY (EMERGENCY)
Facility: HOSPITAL | Age: 59
LOS: 0 days | Discharge: HOME | End: 2019-11-22
Admitting: EMERGENCY MEDICINE
Payer: SUBSIDIZED

## 2019-11-21 VITALS
DIASTOLIC BLOOD PRESSURE: 77 MMHG | HEART RATE: 74 BPM | SYSTOLIC BLOOD PRESSURE: 166 MMHG | TEMPERATURE: 97 F | OXYGEN SATURATION: 99 % | RESPIRATION RATE: 16 BRPM | WEIGHT: 190.04 LBS

## 2019-11-21 DIAGNOSIS — Z00.00 ENCOUNTER FOR GENERAL ADULT MEDICAL EXAMINATION WITHOUT ABNORMAL FINDINGS: ICD-10-CM

## 2019-11-21 PROCEDURE — 99282 EMERGENCY DEPT VISIT SF MDM: CPT

## 2019-11-21 PROCEDURE — 99053 MED SERV 10PM-8AM 24 HR FAC: CPT

## 2019-11-21 NOTE — ED PROVIDER NOTE - PATIENT PORTAL LINK FT
You can access the FollowMyHealth Patient Portal offered by [facility] by registering at the following website: [email or web address]. By joining [employer]’s FollowMyHealth portal, you will also be able to view your health information using other applications (apps) compatible with our system.

## 2019-11-21 NOTE — ED PROVIDER NOTE - PHYSICAL EXAMINATION
CONSTITUTIONAL: Well-appearing; well-nourished; in no apparent distress.   GI/: non-distended; non-tender; no palpable organomegaly.   SKIN: Normal for age and race; warm; dry; good turgor; no apparent lesions or exudate.   NEURO/PSYCH: A & O x 4; grossly unremarkable. mood and manner are appropriate. Grooming and personal hygiene are appropriate. No apparent thoughts of harm to self or others.

## 2019-11-21 NOTE — ED PROVIDER NOTE - OBJECTIVE STATEMENT
pt presents to ED requesting picc line removal. sts was receiving abx via picc line for prostatitis/UTI, last dose today. saw ID 2 days ago and is unsure where he is supposed to go for picc line removal. picc line placed by [facility], unsure if he has an upcoming visit sched. pt feeling well otherwise. Denies fever/chill/HA/dizziness/chest pain/palpitation/sob/abd pain/n/v/d/ black stool/bloody stool/urinary sxs

## 2019-11-21 NOTE — ED PROVIDER NOTE - NSFOLLOWUPINSTRUCTIONS_ED_ALL_ED_FT
CALL YOUR DOCTOR TOMORROW FOR FURTHER INSTRUCTIONS REGARDING THE REMOVAL OF YOUR PICC LINE      Medical Screening Exam  A medical screening exam helps determine whether or not you need emergency medical treatment.    During the medical screening exam, a health care provider does a short physical exam and medical history to assess:    Your current symptoms.  Your overall health.    Depending on your symptoms, you may need additional tests.    What are the possible outcomes of a medical screening exam?  Your medical screening exam may determine that:    You do not need emergency treatment at this time.  You need treatment right away.  You need to be transferred to another medical center.    When should I seek medical care?  If you have a regular health care provider, make an appointment for a follow-up visit with him or her. If you do not have a regular health care provider, ask about resources in your community.    Get help right away if:  Your condition may change over time. If your condition gets worse or you develop new or troubling symptoms before you see your health care provider, go to an emergency department right away.    In an emergency:     Call 911 or have someone drive you to the nearest hospital.  Do not drive yourself.  This information is not intended to replace advice given to you by your health care provider. Make sure you discuss any questions you have with your health care provider.

## 2019-11-21 NOTE — ED PROVIDER NOTE - PROGRESS NOTE DETAILS
advised to call ID/VNS tomorrow for further instructions as picc line cannot be removed in ED  Pt counseled - warning si/sxs d/w pt. Pt instructed to return to ed or f/u with PCP should sxs persist/worsen. Pt verbalizes an understanding & agreement with the plan as discussed

## 2019-11-22 PROBLEM — N41.9 INFLAMMATORY DISEASE OF PROSTATE, UNSPECIFIED: Chronic | Status: ACTIVE | Noted: 2019-10-31

## 2020-01-01 NOTE — ED ADULT NURSE NOTE - OBJECTIVE STATEMENT
Chief Complaint   Patient presents with   • Well Child       Current Outpatient Medications   Medication Sig Dispense Refill   • nystatin (MYCOSTATIN) 081904 UNIT/GM ointment Apply topically 3 times daily. 30 g 0     No current facility-administered medications for this visit.      No past medical history on file.  Patient Active Problem List   Diagnosis   • Hyperbilirubinemia   • Premature infant of 36 weeks gestation   • ABO incompatibility affecting    • Candidal diaper rash     Wt Readings from Last 2 Encounters:   20 6.577 kg (28 %, Z= -0.58)*   20 5.231 kg (28 %, Z= -0.58)*     * Growth percentiles are based on WHO (Boys, 0-2 years) data.     Ht Readings from Last 2 Encounters:   20 25.5\" (64.8 cm) (65 %, Z= 0.38)*   20 22.11\" (56.2 cm) (11 %, Z= -1.24)*     * Growth percentiles are based on WHO (Boys, 0-2 years) data.     HC Readings from Last 2 Encounters:   20 42 cm (16.54\") (61 %, Z= 0.28)*   20 38.1 cm (15\") (17 %, Z= -0.96)*     * Growth percentiles are based on WHO (Boys, 0-2 years) data.     Visit Vitals  Pulse 122   Temp 98.9 °F (37.2 °C)   Resp 34   Ht 25.5\" (64.8 cm)   Wt 6.577 kg   HC 42 cm (16.54\")   BMI 15.68 kg/m²       CONCERNS:  New lesion on back  Child accompanied by mother and father.     DIET:  Bottle feeding:   Type of formula:  sim   Amount per feedin-6   Frequency of feedings:  4  Stools (per day) y    SLEEP:  Good   Patient sleeps in crib   On back y  Sleeps 5-6 hours at a time  Carseat in back seat rear-facing y    DEVELOPMENT:   y  Patient can do the following:   Smile spontaneously   Laugh and    Responds to voice   Grasp objects     Put hands together   Roll over     Maintain more stable head position while sitting    SOCIAL HISTORY:  :  NO  Smoke exposure:  NO  Household members:  mom and dad    PHYSICAL EXAM:    GENERAL:  Well appearing 4 month old male, nontoxic, no acute distress.  Alert and interactive.  SKIN:  Warm,  pink, normal turgor.  No cyanosis.  No rash.2MM HEMANGIOMA MID BACK  HEAD:  Normocephalic, atraumatic.  Anterior fontanelle open, soft, and flat. LARGE FOREHEAD, BRACHY BACK OF HEAD NOT TALLER, SHELF  EYES:  Conjunctivae normal, non-injected, non-icteric.  Red reflex positive bilaterally.  NOSE:  Nares patent.  EARS:  Normal pinnae, normal canals, TMs (tympanic membranes) are transparent with good landmarks.  THROAT:  Oropharynx with moist mucous membranes and no lesions.   NECK:  Supple.  HEART:  Regular rate and rhythm.  Normal S1, S2.  No murmurs, rubs, or gallops.  Pulses 2+ bilaterally.  LUNGS:  Clear to auscultation bilaterally.  No wheezes, rales, rhonchi.  Non-labored breathing.  ABDOMEN:  Soft, non-tender, non-distended.   No organomegaly or masses.  GENITOURINARY:  testes descended bilaterally.  MUSCULOSKELETAL:  FROM (Full range of motion), no deformities, strength 5/5 bilaterally.  EXTREMITIES:  Hips stable, negative Ortolani and Guzmán.   NEUROLOGIC:  Alert, active, positive Greer, grasp, and suck reflexes. LOW TONE SHOULDERS, NOT WEIGHT BEARING.     ASSESSMENT AND PLAN:  4 month old male here for a well-child exam. 37 WEEK  1. Good growth and development:  Check weight bearing, low tone shoulders, HC  2. Discussed with parent:   Starting solids   Teething     3.  Vaccinations:  Pediarix, Prevnar, Hib, Rota  4. HEMANGIOMA  5. MILD BRACHYCEPHALY      Follow-up at 6 months of age for WCC (well-child check).     Pt comes in with c/o right upper tooth pain. Pt was seen here in ED yesterday and discharged and told to follow up today. Pt states pain is better than yesterday.

## 2020-01-30 NOTE — ED PROVIDER NOTE - CONSTITUTIONAL NEGATIVE STATEMENT, MLM
Mr. Avila left a phone message.   He had a spinal cord stimulator placed in November 2019 and there is a place on his spine that is not healing and he is afraid that it will become infected.       He would like to make an appointment to see Dr. Renee, as well as speak to the clinical staff today.    The best number is [phone]   no fever and no chills.

## 2020-02-25 ENCOUNTER — APPOINTMENT (OUTPATIENT)
Dept: UROLOGY | Facility: CLINIC | Age: 60
End: 2020-02-25

## 2020-12-16 PROBLEM — Z87.09 HISTORY OF SORE THROAT: Status: RESOLVED | Noted: 2018-10-22 | Resolved: 2020-12-16

## 2021-07-03 NOTE — ED ADULT TRIAGE NOTE - PRO INTERPRETER NEED 2
Per mom, pt has fever and runny nose. States child has been around another child with RSV   Lithuanian

## 2021-07-26 NOTE — ED PROVIDER NOTE - NS_EDPROVIDERDISPOUSERTYPE_ED_A_ED
Occupational Therapy     Referred by: Rock De Santiago MD; Medical Diagnosis (from order):    Diagnosis Information      Diagnosis    726.0 (ICD-9-CM) - M75.02 (ICD-10-CM) - Adhesive capsulitis of left shoulder                Occupational Therapy -  Daily Treatment Note    Visit:  6     SUBJECTIVE                                                                                                             I have been stretching a lot this weekend  Functional Change: None reported yet   Pain / Symptoms:  Pain rating (out of 10): Current: 6     OBJECTIVE                                                                                                                     Range of Motion (ROM)   (degrees unless noted; active unless noted; norms in ( ); negative=lacking to 0, positive=beyond 0)   Shoulder:     - Flexion (180):        • Left: Passive: 146     - Abduction (180):        • Left: Passive: 153     - External Rotation at 45°:         • Left: Passive: 53    - External Rotation at 90° (90):         • Left: Passive: 59      TREATMENT                                                                                                                  Therapeutic Exercise:  -arm motion with gait, ---patient incorporated well this date   -broom stick assited motion in stance abduction / flexion as well as well as anterior chest stretch   -cupboard arm stretch at various shelf heights for intermittent functional stretching ---educated, issued and completed    -pulleys, shoulder scaption with end range focus and body relaxation----patient considering purchase, has info to refer to   -wall slides with assist from right arm, shoulder flexion and abduction  x 5 reps with cues for 143*max flexion 153* max scaption  ---educated, issued and completed    -supine cane External Rotation  Stretch at 90* with cane propped on forearm,---cues for positioning ---second rep patient already achieved 52*  -supine gentle bicep stretch arm  supported on towel roll while body rolls away, returning to neutral on an inhale and extending legs after to bring spine back to neutral ---educated, issued and completed    -supine cane shoulder flexion, left arm in neutral rotation coordinated relaxation with the breath,   -wall bicep stretch, 30 seconds, ---cues for alignment only      Manual Therapy:  -PROM supine in all planes,  -anterior glenohumeral / pectoralis lengthening       Skilled input: verbal instruction/cues, tactile instruction/cues, posture correction, facilitation, inhibition and as detailed above    Writer verbally educated and received verbal consent for hand placement, positioning of patient, and techniques to be performed today from patient for clothing adjustments for techniques, therapist position for techniques and hand placement and palpation for techniques as described above and how they are pertinent to the patient's plan of care.    Home Exercise Program: Access Code: [email or web address]  URL: [email or web address]  Date: 07/19/2021  Prepared by: Ana Arana    Exercises  · Seated Scapular Retraction - 6 x daily - 7 x weekly - 1 sets - 5 reps - 10 seconds hold  · Supine Shoulder Flexion AAROM with Hands Clasped - 2-3 x daily - 7 x weekly - 2 sets - 10 reps  · Seated Shoulder Pendulum Exercise - 2-3 x daily - 7 x weekly - 2 sets - 10 reps  -supine cane External Rotation  Stretch at 90* with cane propped on forearm, 3 minutes with end range active hold  -supine bicep stretch  -supine cane shoulder flexion   wall slides flexion / abduction        ASSESSMENT                                                                                                             Patient displayed increased PROm and AAROM with increased awareness for posture / relaxation.    Patient considering pulleys if she feels benefit beyond her current stretches   Pain/symptoms after session (out of 10): 4    Patient Education:   Results of above  outlined education: Verbalizes understanding, Demonstrates understanding and Needs reinforcement      PLAN                                                                                                                           Suggestions for next session as indicated: Progress per plan of care  -check supine cane External Rotation  Stretch at 90* with cane propped on forearm, 3 minutes with end range active hold  Check supine bicep stretch as well as standing  Check wall slides flexion / abduction   Trial functional stretching broom, iggy, refrigerator   pulleys, --check if decided to purchase   PROM end range hold,   AAROM,   soft tissue mobilization, bicep stretch   Check arm swing          Therapy procedure time and total treatment time can be found documented on the Time Entry flowsheet   Attending Attestation (For Attendings USE Only)...

## 2021-08-23 NOTE — DISCHARGE NOTE PROVIDER - INSTRUCTIONS
Medicare Wellness Visit patient outreach outcome:  Successfully contacted: Currently established with non-[facility] provider     Pt lives in Assisted Living.     Kari Lopez  Population Health Assistant   PH. [phone]    
DASH, carb consistent diet

## 2021-11-16 NOTE — ED ADULT TRIAGE NOTE - NS ED NURSE DIRECT TO ROOM YN
No Show Aperture Variable?: Yes Post-Care Instructions: I reviewed with the patient in detail post-care instructions. Patient is to wear sunprotection, and avoid picking at any of the treated lesions. Pt may apply Vaseline to crusted or scabbing areas. Consent: The patient's consent was obtained including but not limited to risks of crusting, scabbing, blistering, scarring, darker or lighter pigmentary change, recurrence, incomplete removal and infection. Render Note In Bullet Format When Appropriate: No Detail Level: Detailed Duration Of Freeze Thaw-Cycle (Seconds): 0

## 2022-06-16 NOTE — DISCHARGE NOTE NURSING/CASE MANAGEMENT/SOCIAL WORK - NSDCPEFALRISK_GEN_ALL_CORE
Patient information on fall and injury prevention
9 yo male with right thigh swelling and tenderness. Concern for abscess and cellulitis. Will obtain labs, us  Ana María Toledo MD

## 2022-07-29 NOTE — PATIENT PROFILE ADULT - LAST ORAL INTAKE
22-Sep-2019 17:44 Ivermectin Counseling:  Patient instructed to take medication on an empty stomach with a full glass of water.  Patient informed of potential adverse effects including but not limited to nausea, diarrhea, dizziness, itching, and swelling of the extremities or lymph nodes.  The patient verbalized understanding of the proper use and possible adverse effects of ivermectin.  All of the patient's questions and concerns were addressed.

## 2023-06-20 NOTE — DISCHARGE NOTE PROVIDER - NSDCHHPTASSISTHOME_GEN_ALL_CORE
Discussed perioperative course  - Discussed risks, benefits, alternatives, including risk of bleeding, infection, need for repeat or revision procedure, persistent snoring, dehydration requiring ER intervention, post tonsillectomy hemorrhage, anesthetic risk        Total Time: minutes: 5-10 minutes    Chase De Jesusr was evaluated through a synchronous (real-time) audio encounter. Patient identification was verified at the start of the visit. He (or guardian if applicable) is aware that this is a billable service, which includes applicable co-pays. This visit was conducted with the patient's (and/or legal guardian's) verbal consent. He has not had a related appointment within my department in the past 7 days or scheduled within the next 24 hours. The patient was located at Home: [place] [id number]. The provider was located at [place] [address] (Appt Dept): [facility] [address]. Note: not billable if this call serves to triage the patient into an appointment for the relevant concern       The patient was instructed to return to clinic if no improvement or progression of symptoms. Signs to watch out for reviewed.       MD Chata Carrillo [facility]  [address] [place] [address] 6  [facility]  Office Phone: [phone] Patient Needs Assistance to Leave Residence...

## 2023-07-10 NOTE — ED ADULT NURSE NOTE - AS SC BRADEN SENSORY
Detail Level: Detailed Quality 402: Tobacco Use And Help With Quitting Among Adolescents: Patient screened for tobacco and never smoked Quality 130: Documentation Of Current Medications In The Medical Record: Current Medications Documented (4) no impairment

## 2023-12-12 NOTE — DISCHARGE NOTE PROVIDER - HOSPITAL COURSE
Pigmentary glaucoma of both eyes, moderate stage  IOP is stable. Continue Latanoprost at bedtime in both eyes.      We will have patient back in 4 months for a visual field, OCT, complete exam and photos
58 yr old M with PMHx of prostatitis 1 month ago, HTN, DM-2 , BPH presents to the ED for hematuria and dysuria. He mentions that he started to see blood in his urine since yesterday and is feeling some burning on urination. He denies pus or foul smelling urine, no fever/chills, no chest pain, sob, abd pain. He endorses intermittent headache and low back pain but no flank pain. He was admitted 1 month ago for acute prostatitis and was found to have ESBL E.coli, was started on meropenem and was discharged on Bactrim PO.    In ED, VS wnl, UA positive for WBC (>100), large leuk esterase, and glycosuria. Us abdomen showed prostatomegaly with prostate volume of 144 mL.    Patient was admitted for complicated UTI, prostatitis. Patient was treated with Meropenem 1g IV q8h. Urine culture came back positive for ESBL E Coli. A midline was placed for prolonged antibioitic therapy, 21 days, with an end date of 11/21. Patient will be switched to ertapenem when discharged. Patient was given one dose of ertapenem with not adverse reaction. Patient is stable and ready for discharge.

## 2024-02-20 NOTE — ED ADULT NURSE NOTE - CAS TRG GENERAL [employer], MLM
Increase amlodipine to 5mg at bedtime.     Continue losartan 50mg in the morning.     Continue HCTZ 12.5mg daily.          Patent

## 2024-03-18 NOTE — ED ADULT NURSE NOTE - NS ED NURSE LEVEL OF CONSCIOUSNESS SPEECH
Thank you for letting me take care of YOU!!    ANAHI Abrams [facility]  [address] [facility]  [id number] [place] [address] Drive  [phone] phone     [phone] fax  Hours of Operation: Monday- Friday 8:00am- 5:00pm  After hours phone  [phone]  Hematology / Oncology Physicians on call:    Dr. Cody Gee        Nurse Practitioners:    Berenice Conley, MONICA Prajapati, ALFONSO Eldridge NP Khelsea Conley, MONICA Rivera, NP      Please don't hesitate to call if you have any concerns.    
Speaking Coherently

## 2025-07-17 NOTE — ED PROVIDER NOTE - CCCP TRG CHIEF CMPLNT
Ling presented for evaluation of cough today.  Reassurance provided that Ling's lungs are clear on examination today.    A virus is likely causing their symptoms.    Albuterol inhaler as prescribed as needed     Take steroids as prescribed.   Recommend to take them in the morning and with food  Do not take Ibuprofen while on steroids.   May take Acetaminophen for pain.  Discussed that steroids may elevated blood glucose levels and that pt should monitor blood sugars closely.     Recommend the following options: room humidifier, vicks vapo rub, hot/steamy shower.  Offer fluids frequently to help with hydration, as staying hydrated helps loosen up thick mucous.   May drink warm water with honey. May use lemon.  Tylenol/Ibuprofen for pain/fever.  Encourage coughing into the elbow instead of the hand.   Washing hands frequently with warm water and soap may help stop spread of infection.    If symptoms do not improve please follow with PCP for further evaluation.    Follow up with PCP in 3-5 days.  Proceed to ER if symptoms worsen.    If tests are performed, our office will contact you with results only if changes need to made to the care plan discussed with you at the visit. You can review your full results on [facility] Mychart.     see chief complaint quote